# Patient Record
Sex: FEMALE | Race: WHITE | NOT HISPANIC OR LATINO | Employment: FULL TIME | URBAN - METROPOLITAN AREA
[De-identification: names, ages, dates, MRNs, and addresses within clinical notes are randomized per-mention and may not be internally consistent; named-entity substitution may affect disease eponyms.]

---

## 2017-01-10 ENCOUNTER — LAB REQUISITION (OUTPATIENT)
Dept: LAB | Facility: HOSPITAL | Age: 62
End: 2017-01-10
Payer: COMMERCIAL

## 2017-01-10 ENCOUNTER — ALLSCRIPTS OFFICE VISIT (OUTPATIENT)
Dept: OTHER | Facility: OTHER | Age: 62
End: 2017-01-10

## 2017-01-10 DIAGNOSIS — R30.0 DYSURIA: ICD-10-CM

## 2017-01-10 LAB
GLUCOSE (HISTORICAL): NORMAL
HGB UR QL STRIP.AUTO: NORMAL
KETONES UR STRIP-MCNC: NORMAL MG/DL
LEUKOCYTE ESTERASE UR QL STRIP: 1
NITRITE UR QL STRIP: NORMAL
PH UR STRIP.AUTO: 5 [PH]
PROT UR STRIP-MCNC: NORMAL MG/DL
SP GR UR STRIP.AUTO: 1.02

## 2017-01-10 PROCEDURE — G0145 SCR C/V CYTO,THINLAYER,RESCR: HCPCS | Performed by: FAMILY MEDICINE

## 2017-01-10 PROCEDURE — 87086 URINE CULTURE/COLONY COUNT: CPT | Performed by: PHYSICIAN ASSISTANT

## 2017-01-11 LAB — BACTERIA UR CULT: NORMAL

## 2017-01-12 ENCOUNTER — LAB REQUISITION (OUTPATIENT)
Dept: LAB | Facility: HOSPITAL | Age: 62
End: 2017-01-12
Payer: COMMERCIAL

## 2017-01-12 DIAGNOSIS — Z01.419 ENCOUNTER FOR GYNECOLOGICAL EXAMINATION WITHOUT ABNORMAL FINDING: ICD-10-CM

## 2017-01-16 LAB
LAB AP GYN PRIMARY INTERPRETATION: NORMAL
Lab: NORMAL

## 2017-06-30 ENCOUNTER — ALLSCRIPTS OFFICE VISIT (OUTPATIENT)
Dept: OTHER | Facility: OTHER | Age: 62
End: 2017-06-30

## 2018-01-12 VITALS
HEIGHT: 66 IN | DIASTOLIC BLOOD PRESSURE: 80 MMHG | OXYGEN SATURATION: 98 % | BODY MASS INDEX: 27.97 KG/M2 | SYSTOLIC BLOOD PRESSURE: 130 MMHG | WEIGHT: 174 LBS | HEART RATE: 62 BPM

## 2018-01-12 VITALS
BODY MASS INDEX: 27.97 KG/M2 | SYSTOLIC BLOOD PRESSURE: 130 MMHG | OXYGEN SATURATION: 98 % | WEIGHT: 174 LBS | DIASTOLIC BLOOD PRESSURE: 90 MMHG | HEART RATE: 64 BPM | HEIGHT: 66 IN

## 2018-04-25 ENCOUNTER — OFFICE VISIT (OUTPATIENT)
Dept: FAMILY MEDICINE CLINIC | Facility: CLINIC | Age: 63
End: 2018-04-25
Payer: COMMERCIAL

## 2018-04-25 VITALS
RESPIRATION RATE: 20 BRPM | SYSTOLIC BLOOD PRESSURE: 128 MMHG | TEMPERATURE: 97 F | OXYGEN SATURATION: 98 % | HEART RATE: 73 BPM | HEIGHT: 66 IN | DIASTOLIC BLOOD PRESSURE: 80 MMHG | BODY MASS INDEX: 28.28 KG/M2 | WEIGHT: 176 LBS

## 2018-04-25 DIAGNOSIS — Z12.11 SCREENING FOR COLON CANCER: ICD-10-CM

## 2018-04-25 DIAGNOSIS — R10.11 RIGHT UPPER QUADRANT ABDOMINAL PAIN: Primary | ICD-10-CM

## 2018-04-25 PROCEDURE — 99214 OFFICE O/P EST MOD 30 MIN: CPT | Performed by: NURSE PRACTITIONER

## 2018-04-25 RX ORDER — DEXTROAMPHETAMINE SULFATE, DEXTROAMPHETAMINE SACCHARATE, AMPHETAMINE ASPARTATE MONOHYDRATE, AND AMPHETAMINE SULFATE 12.5; 12.5; 12.5; 12.5 MG/1; MG/1; MG/1; MG/1
1 CAPSULE, EXTENDED RELEASE ORAL DAILY
Refills: 0 | COMMUNITY
Start: 2018-04-08

## 2018-04-25 RX ORDER — ONDANSETRON 4 MG/1
4 TABLET, FILM COATED ORAL EVERY 8 HOURS PRN
Qty: 20 TABLET | Refills: 0 | Status: SHIPPED | OUTPATIENT
Start: 2018-04-25 | End: 2020-03-02 | Stop reason: ALTCHOICE

## 2018-04-25 RX ORDER — DESVENLAFAXINE 100 MG/1
100 TABLET, EXTENDED RELEASE ORAL DAILY
COMMUNITY
Start: 2018-02-08

## 2018-04-25 NOTE — LETTER
April 25, 2018     Patient: Sun Ernandez   YOB: 1955   Date of Visit: 4/25/2018       To Whom it May Concern:    Sun Ernandez is under my professional care  She was seen in my office on 4/25/2018  She may return to work on Friday 4/27/18  She is also excused for the date of 04/24/18    If you have any questions or concerns, please don't hesitate to call           Sincerely,              NEETA Nava

## 2018-04-25 NOTE — PROGRESS NOTES
Assessment/Plan:    Screening for colon cancer    Provided referral to obtain colonoscopy  Right upper quadrant abdominal pain  To obtain abdominal ultrasound, office staff made patient an appointment for tomorrow at 1:00 pm   Will call with results  Counseled on beginning bland diet, to avoid fatty foods  To begin ondansetron every 8 hours as needed for nausea  To ER for any worsening abdominal pain  Follow-up after obtaining ultrasound  Diagnoses and all orders for this visit:    Right upper quadrant abdominal pain  -     ondansetron (ZOFRAN) 4 mg tablet; Take 1 tablet (4 mg total) by mouth every 8 (eight) hours as needed for nausea or vomiting  -     US abdomen complete; Future    Screening for colon cancer  -     Ambulatory referral to Gastroenterology; Future    Other orders  -     MYDAYIS 50 MG CP24; Take 1 capsule by mouth daily  -     desvenlafaxine (PRISTIQ) 100 mg 24 hr tablet;           Subjective:      Patient ID: Adriana Cárdenas is a 66 y o  female  Christophe Romano presents reporting multiple episode of non-bloody, bilious vomiting that started suddenly yesterday morning  She vomited about 4-5 times  The last time she vomited was early this morning  She continues to have some nausea  Denies fever, sick contacts, recent antibiotic usage, recent travel, or new medications  She does have a history of developing right upper quadrant pain intermittently for the past 2 years  She can typically  this with over indulging in fatty foods  She will developed right upper quadrant pain that will radiate to her mid back, she will also have vomiting with this  Currently, she is having mild right upper quadrant tenderness  It does not radiate  Christophe Romano has been taking omeprazole for heartburn symptoms which will provide minimal relief  Denies hematochezia, weight loss, or change in stools  She has never had a colonoscopy        Vomiting    Associated symptoms include abdominal pain and chills  Pertinent negatives include no diarrhea  Abdominal Pain   Associated symptoms include constipation, nausea and vomiting  Pertinent negatives include no diarrhea  The following portions of the patient's history were reviewed and updated as appropriate: She  has a past medical history of Dysuria and Gout  She   Patient Active Problem List    Diagnosis Date Noted    Right upper quadrant abdominal pain 04/25/2018    Screening for colon cancer 04/25/2018    GERD (gastroesophageal reflux disease) 10/21/2015    Anxiety 10/22/2014    Hyperlipidemia, mixed 10/22/2014     She  has a past surgical history that includes Other surgical history; Inguinal hernia repair; and Oophorectomy  Her family history includes Alcohol abuse in her father; Atrial fibrillation in her mother; Depression in her mother; Heart attack in her father, maternal grandfather, and paternal grandfather; Other in her maternal grandmother; Stroke in her paternal grandmother  She  reports that she has never smoked  She has never used smokeless tobacco  She reports that she does not drink alcohol or use drugs  Current Outpatient Prescriptions   Medication Sig Dispense Refill    ALPRAZolam (XANAX) 0 25 mg tablet 0 25 mg every 12 (twelve) hours as needed  1    cloNIDine (CATAPRES) 0 1 mg tablet TAKE 1/2 TABLETS BY MOUTH TWICE DAILY AND 1 AT BEDTIME  0    desvenlafaxine (PRISTIQ) 100 mg 24 hr tablet       MYDAYIS 50 MG CP24 Take 1 capsule by mouth daily  0    omeprazole (PriLOSEC) 20 mg delayed release capsule Take 1 capsule by mouth 2 (two) times a day      ondansetron (ZOFRAN) 4 mg tablet Take 1 tablet (4 mg total) by mouth every 8 (eight) hours as needed for nausea or vomiting 20 tablet 0     No current facility-administered medications for this visit        Current Outpatient Prescriptions on File Prior to Visit   Medication Sig    ALPRAZolam (XANAX) 0 25 mg tablet 0 25 mg every 12 (twelve) hours as needed    cloNIDine (CATAPRES) 0 1 mg tablet TAKE 1/2 TABLETS BY MOUTH TWICE DAILY AND 1 AT BEDTIME    omeprazole (PriLOSEC) 20 mg delayed release capsule Take 1 capsule by mouth 2 (two) times a day    [DISCONTINUED] amphetamine-dextroamphetamine (ADDERALL XR) 30 MG 24 hr capsule TAKE ONE CAPSULE AT 2PM    [DISCONTINUED] Desvenlafaxine Succinate ER (PRISTIQ) 25 MG TB24 Take 1 capsule by mouth daily    [DISCONTINUED] LORazepam (ATIVAN) 0 5 mg tablet Take 1 tablet by mouth 2 (two) times a day    [DISCONTINUED] omeprazole (PriLOSEC) 20 mg delayed release capsule     [DISCONTINUED] VYVANSE 70 MG capsule Take 70 mg by mouth daily     No current facility-administered medications on file prior to visit  She has No Known Allergies       Review of Systems   Constitutional: Positive for chills  HENT: Negative  Respiratory: Negative  Cardiovascular: Negative  Gastrointestinal: Positive for abdominal pain, constipation, nausea and vomiting  Negative for anal bleeding, blood in stool and diarrhea  Genitourinary: Negative  Musculoskeletal: Negative  Neurological: Negative  Psychiatric/Behavioral: Negative  /80   Pulse 73   Temp (!) 97 °F (36 1 °C)   Resp 20   Ht 5' 6" (1 676 m)   Wt 79 8 kg (176 lb)   SpO2 98%   BMI 28 41 kg/m²     Objective:     Physical Exam   Constitutional: She is oriented to person, place, and time  Vital signs are normal  She appears well-developed  Neck: Neck supple  Cardiovascular: Normal rate, regular rhythm and normal heart sounds  No murmur heard  Pulmonary/Chest: Effort normal and breath sounds normal    Abdominal: Soft  Bowel sounds are normal  There is no hepatosplenomegaly  There is tenderness in the right upper quadrant  Lymphadenopathy:     She has no cervical adenopathy  Neurological: She is alert and oriented to person, place, and time  Skin: Skin is warm and dry  Psychiatric: She has a normal mood and affect   Her behavior is normal  Judgment and thought content normal

## 2018-04-25 NOTE — ASSESSMENT & PLAN NOTE
To obtain abdominal ultrasound, office staff made patient an appointment for tomorrow at 1:00 pm   Will call with results  Counseled on beginning bland diet, to avoid fatty foods  To begin ondansetron every 8 hours as needed for nausea  To ER for any worsening abdominal pain  Follow-up after obtaining ultrasound

## 2018-04-26 ENCOUNTER — HOSPITAL ENCOUNTER (OUTPATIENT)
Dept: ULTRASOUND IMAGING | Facility: CLINIC | Age: 63
Discharge: HOME | End: 2018-04-26
Payer: COMMERCIAL

## 2018-04-26 DIAGNOSIS — R10.11 RIGHT UPPER QUADRANT ABDOMINAL PAIN: ICD-10-CM

## 2018-04-26 PROCEDURE — 76700 US EXAM ABDOM COMPLETE: CPT

## 2018-04-30 DIAGNOSIS — R10.11 RIGHT UPPER QUADRANT ABDOMINAL PAIN: Primary | ICD-10-CM

## 2018-04-30 NOTE — PROGRESS NOTES
Please call patient to make aware that abdominal ultrasound is negative, would like patient to obtain HIDA scan    Thank you

## 2018-06-28 ENCOUNTER — OFFICE VISIT (OUTPATIENT)
Dept: GASTROENTEROLOGY | Facility: CLINIC | Age: 63
End: 2018-06-28
Payer: COMMERCIAL

## 2018-06-28 VITALS
HEIGHT: 66 IN | BODY MASS INDEX: 29.29 KG/M2 | DIASTOLIC BLOOD PRESSURE: 74 MMHG | WEIGHT: 182.25 LBS | SYSTOLIC BLOOD PRESSURE: 122 MMHG | HEART RATE: 76 BPM

## 2018-06-28 DIAGNOSIS — K21.9 CHRONIC GERD: ICD-10-CM

## 2018-06-28 DIAGNOSIS — K21.9 GASTROESOPHAGEAL REFLUX DISEASE, ESOPHAGITIS PRESENCE NOT SPECIFIED: ICD-10-CM

## 2018-06-28 DIAGNOSIS — Z12.11 SCREENING FOR COLON CANCER: Primary | ICD-10-CM

## 2018-06-28 DIAGNOSIS — R11.14 BILIOUS VOMITING WITH NAUSEA: ICD-10-CM

## 2018-06-28 PROCEDURE — 99203 OFFICE O/P NEW LOW 30 MIN: CPT | Performed by: NURSE PRACTITIONER

## 2018-06-28 RX ORDER — FAMOTIDINE 40 MG/1
40 TABLET, FILM COATED ORAL
Qty: 30 TABLET | Refills: 6 | Status: SHIPPED | OUTPATIENT
Start: 2018-06-28 | End: 2019-08-30

## 2018-06-28 NOTE — PROGRESS NOTES
Assessment/Plan:    GERD diet discussed and handout given patient is agreeable to trying  EGD and initial colonoscopy  Pepcid as prescribed       Diagnoses and all orders for this visit:    Screening for colon cancer  -     Ambulatory referral to Gastroenterology; Future  -     Case request operating room: EGD AND COLONOSCOPY; Standing  -     Case request operating room: EGD AND COLONOSCOPY    Chronic GERD  -     Case request operating room: EGD AND COLONOSCOPY; Standing  -     famotidine (PEPCID) 40 MG tablet; Take 1 tablet (40 mg total) by mouth daily at bedtime for 30 days  -     Case request operating room: EGD AND COLONOSCOPY    Bilious vomiting with nausea  -     Case request operating room: EGD AND COLONOSCOPY; Standing  -     famotidine (PEPCID) 40 MG tablet; Take 1 tablet (40 mg total) by mouth daily at bedtime for 30 days  -     Case request operating room: EGD AND COLONOSCOPY    Gastroesophageal reflux disease, esophagitis presence not specified  -     famotidine (PEPCID) 40 MG tablet; Take 1 tablet (40 mg total) by mouth daily at bedtime for 30 days    @ASSESSMENTEN  D@     Subjective:      Patient ID: Yovany Alas is a 66 y o  female  53-year-old female is here for screening colonoscopy and reports no diarrhea, constipation, melena or hematochezia  She has no family history of colon cancer  Patient with GERD and heartburn 2-3 times a week for which she does not take medications for and periodically after she eats a very heavy fatty meal she has nausea, vomiting that is bilious without hematocrit emesis  She denies dysphagia, chronic sore throat, hoarse voice  She hadn't ultrasound of her abdomen by her PCP that was normal    Both of her daughters have GERD is well  She is scheduled for hidascan for right upper quadrant discomfort that occurs when she has ceased distinct episodes   Her last episode of nausea, vomiting, bloating occurred after she had a large Easter dinner with cheesecake for December  The following portions of the patient's history were reviewed and updated as appropriate: She  has a past medical history of Anxiety; Depression; Dysuria; Endometriosis; Gout; and History of fractured kneecap  She   Patient Active Problem List    Diagnosis Date Noted    Bilious vomiting with nausea 06/28/2018    Right upper quadrant abdominal pain 04/25/2018    Screening for colon cancer 04/25/2018    GERD (gastroesophageal reflux disease) 10/21/2015    Anxiety 10/22/2014    Hyperlipidemia, mixed 10/22/2014     She  has a past surgical history that includes Other surgical history; Inguinal hernia repair; Oophorectomy; and Other surgical history  Her family history includes Alcohol abuse in her father; Atrial fibrillation in her mother; Dementia in her mother; Depression in her mother; Heart attack in her father, maternal grandfather, and paternal grandfather; Other in her maternal grandmother; Stroke in her paternal grandmother  She  reports that she has never smoked  She has never used smokeless tobacco  She reports that she does not drink alcohol or use drugs  Current Outpatient Prescriptions   Medication Sig Dispense Refill    ALPRAZolam (XANAX) 0 25 mg tablet 0 25 mg every 12 (twelve) hours as needed  1    cloNIDine (CATAPRES) 0 1 mg tablet TAKE 1/2 TABLETS BY MOUTH TWICE DAILY AND 1 AT BEDTIME as needed  0    desvenlafaxine (PRISTIQ) 100 mg 24 hr tablet       MYDAYIS 50 MG CP24 Take 1 capsule by mouth daily  0    omeprazole (PriLOSEC) 20 mg delayed release capsule Take 1 capsule by mouth 2 (two) times a day      famotidine (PEPCID) 40 MG tablet Take 1 tablet (40 mg total) by mouth daily at bedtime for 30 days 30 tablet 6    ondansetron (ZOFRAN) 4 mg tablet Take 1 tablet (4 mg total) by mouth every 8 (eight) hours as needed for nausea or vomiting 20 tablet 0     No current facility-administered medications for this visit        Current Outpatient Prescriptions on File Prior to Visit   Medication Sig    ALPRAZolam (XANAX) 0 25 mg tablet 0 25 mg every 12 (twelve) hours as needed    cloNIDine (CATAPRES) 0 1 mg tablet TAKE 1/2 TABLETS BY MOUTH TWICE DAILY AND 1 AT BEDTIME as needed    desvenlafaxine (PRISTIQ) 100 mg 24 hr tablet     MYDAYIS 50 MG CP24 Take 1 capsule by mouth daily    omeprazole (PriLOSEC) 20 mg delayed release capsule Take 1 capsule by mouth 2 (two) times a day    ondansetron (ZOFRAN) 4 mg tablet Take 1 tablet (4 mg total) by mouth every 8 (eight) hours as needed for nausea or vomiting     No current facility-administered medications on file prior to visit  She has No Known Allergies       Review of Systems   Constitutional: Negative  HENT: Negative  Eyes: Negative  Respiratory: Negative  Cardiovascular: Negative  Gastrointestinal: Positive for nausea and vomiting  Endocrine: Negative  Musculoskeletal: Negative  Objective:      /74   Pulse 76   Ht 5' 6" (1 676 m)   Wt 82 7 kg (182 lb 4 oz)   BMI 29 42 kg/m²          Physical Exam   Constitutional: She is oriented to person, place, and time  She appears well-developed and well-nourished  Eyes: No scleral icterus  Cardiovascular: Normal rate and regular rhythm  Pulmonary/Chest: Effort normal and breath sounds normal    Abdominal: Soft  Bowel sounds are normal    Lymphadenopathy:     She has no cervical adenopathy  Neurological: She is alert and oriented to person, place, and time  Skin: Skin is warm and dry  Psychiatric: She has a normal mood and affect

## 2018-07-12 ENCOUNTER — ANESTHESIA EVENT (OUTPATIENT)
Dept: PERIOP | Facility: HOSPITAL | Age: 63
End: 2018-07-12
Payer: COMMERCIAL

## 2018-07-12 ENCOUNTER — HOSPITAL ENCOUNTER (OUTPATIENT)
Facility: HOSPITAL | Age: 63
Setting detail: OUTPATIENT SURGERY
Discharge: HOME | End: 2018-07-12
Attending: INTERNAL MEDICINE | Admitting: INTERNAL MEDICINE
Payer: COMMERCIAL

## 2018-07-12 ENCOUNTER — ANESTHESIA (OUTPATIENT)
Dept: PERIOP | Facility: HOSPITAL | Age: 63
End: 2018-07-12
Payer: COMMERCIAL

## 2018-07-12 VITALS
HEIGHT: 66 IN | TEMPERATURE: 98.4 F | RESPIRATION RATE: 20 BRPM | WEIGHT: 175.71 LBS | OXYGEN SATURATION: 99 % | DIASTOLIC BLOOD PRESSURE: 72 MMHG | BODY MASS INDEX: 28.24 KG/M2 | SYSTOLIC BLOOD PRESSURE: 136 MMHG | HEART RATE: 50 BPM

## 2018-07-12 DIAGNOSIS — K21.9 CHRONIC GERD: ICD-10-CM

## 2018-07-12 DIAGNOSIS — R11.14 BILIOUS VOMITING WITH NAUSEA: ICD-10-CM

## 2018-07-12 DIAGNOSIS — Z12.11 SCREENING FOR COLON CANCER: ICD-10-CM

## 2018-07-12 PROCEDURE — 43239 EGD BIOPSY SINGLE/MULTIPLE: CPT | Performed by: INTERNAL MEDICINE

## 2018-07-12 PROCEDURE — 88305 TISSUE EXAM BY PATHOLOGIST: CPT | Performed by: PATHOLOGY

## 2018-07-12 PROCEDURE — 45385 COLONOSCOPY W/LESION REMOVAL: CPT | Performed by: INTERNAL MEDICINE

## 2018-07-12 PROCEDURE — 88342 IMHCHEM/IMCYTCHM 1ST ANTB: CPT | Performed by: PATHOLOGY

## 2018-07-12 RX ORDER — PROPOFOL 10 MG/ML
INJECTION, EMULSION INTRAVENOUS AS NEEDED
Status: DISCONTINUED | OUTPATIENT
Start: 2018-07-12 | End: 2018-07-12 | Stop reason: SURG

## 2018-07-12 RX ORDER — SODIUM CHLORIDE, SODIUM LACTATE, POTASSIUM CHLORIDE, CALCIUM CHLORIDE 600; 310; 30; 20 MG/100ML; MG/100ML; MG/100ML; MG/100ML
INJECTION, SOLUTION INTRAVENOUS CONTINUOUS PRN
Status: DISCONTINUED | OUTPATIENT
Start: 2018-07-12 | End: 2018-07-12 | Stop reason: SURG

## 2018-07-12 RX ADMIN — PROPOFOL 50 MG: 10 INJECTION, EMULSION INTRAVENOUS at 08:07

## 2018-07-12 RX ADMIN — PROPOFOL 100 MG: 10 INJECTION, EMULSION INTRAVENOUS at 07:52

## 2018-07-12 RX ADMIN — PROPOFOL 50 MG: 10 INJECTION, EMULSION INTRAVENOUS at 08:11

## 2018-07-12 RX ADMIN — PROPOFOL 50 MG: 10 INJECTION, EMULSION INTRAVENOUS at 07:57

## 2018-07-12 RX ADMIN — SODIUM CHLORIDE, SODIUM LACTATE, POTASSIUM CHLORIDE, AND CALCIUM CHLORIDE: .6; .31; .03; .02 INJECTION, SOLUTION INTRAVENOUS at 07:36

## 2018-07-12 RX ADMIN — PROPOFOL 50 MG: 10 INJECTION, EMULSION INTRAVENOUS at 08:02

## 2018-07-12 RX ADMIN — PROPOFOL 30 MG: 10 INJECTION, EMULSION INTRAVENOUS at 08:15

## 2018-07-12 NOTE — OP NOTE
**** GI/ENDOSCOPY REPORT ****     PATIENT NAME: ERINN CRAWFORD ------ VISIT ID:  Patient ID:   [id number] YOB: 1955     INTRODUCTION: Colonoscopy - A 66 female patient presents for an outpatient   Colonoscopy at [address] [place] [address]  PREVIOUS COLONOSCOPY: No prior colonoscopy  INDICATIONS: Screening-ADR  CONSENT:  The benefits, risks, and alternatives to the procedure were   discussed and informed consent was obtained from the patient  PREPARATION: EKG, pulse, pulse oximetry and blood pressure were monitored   throughout the procedure  The patient was identified by myself both   verbally and by visual inspection of ID band  Airway Assessment   Classification: Airway class 2 - Visualization of the soft palate, fauces   and uvula  ASA Classification: Class 2 - Patient has mild to moderate   systemic disturbance that may or may not be related to the disorder   requiring surgery  MEDICATIONS: Anesthesia-check records     PROCEDURE:  The endoscope was passed without difficulty through the anus   under direct visualization and advanced to the cecum, confirmed by   appendiceal orifice and ileocecal valve  The scope was withdrawn and the   mucosa was carefully examined  The quality of the preparation was fair  Cecal Intubation Time: 13 minutes(s) Scope Withdrawal Time: 5 minutes(s)     RECTAL EXAM: Normal rectal exam      FINDINGS:  A single flat polyp, measuring 1 cm in size, was found in the   ascending colon  The polyp was completely removed piecemeal by snare   cautery polypectomy  Retrieved  A single polyp, measuring less than 5 mm   in size, was found in the ascending colon  The polyp was completely   removed by hot biopsy polypectomy  The polyp was retrieved  Normal   retroversion     COMPLICATIONS: There were no complications  IMPRESSIONS: A single flat polyp found in the ascending colon; removed by   snare cautery polypectomy   A single polyp found in the ascending colon;   removed by hot biopsy polypectomy  RECOMMENDATIONS: Follow-up on the results of the biopsy specimens  Colonoscopy recommended in 1 year  The recommended follow-up interval was   changed due to preparation for exam was inadequate/incomplete  ESTIMATED BLOOD LOSS: None  PATHOLOGY SPECIMENS: Completely removed by snare cautery polypectomy  Single polyp completely removed by hot biopsy polypectomy   PROCEDURE CODES: : Colonoscopy with snare polypectomy : Colonoscopy with   removal of tumor(s), polyp(s), or other lesion(s) by hot biopsy forceps or   bipolar cautery     ICD-9 Codes: 211 3 Benign neoplasm of colon 211 3 Benign neoplasm of colon     ICD-10 Codes: K63 5 Polyp of colon K63 5 Polyp of colon     PERFORMED BY: SANTIAGO Westbrook  [facility]  on 07/12/2018  Version 1, electronically signed by SANTIAGO Hoffman , D O  on   07/12/2018 at 08:25

## 2018-07-12 NOTE — ANESTHESIA PREPROCEDURE EVALUATION
Review of Systems/Medical History  Patient summary reviewed  Chart reviewed  No history of anesthetic complications     Cardiovascular  Exercise tolerance (METS): >4,  Hyperlipidemia,    Pulmonary       GI/Hepatic    GERD well controlled,             Endo/Other     GYN       Hematology   Musculoskeletal       Neurology   Psychology   Anxiety, Depression ,              Physical Exam    Airway    Mallampati score: II  TM Distance: >3 FB  Neck ROM: full     Dental   No notable dental hx     Cardiovascular      Pulmonary      Other Findings        Anesthesia Plan  ASA Score- 2     Anesthesia Type- IV sedation with anesthesia with ASA Monitors  Additional Monitors:   Airway Plan:         Plan Factors-    Induction- intravenous  Postoperative Plan-     Informed Consent- Anesthetic plan and risks discussed with patient  I personally reviewed this patient with the CRNA  Discussed and agreed on the Anesthesia Plan with the CRNA  Rosella Goldmann

## 2018-07-12 NOTE — DISCHARGE INSTRUCTIONS
Colonoscopy   WHAT YOU NEED TO KNOW:   A colonoscopy is a procedure to examine the inside of your colon (intestine) with a scope  Polyps or tissue growths may have been removed during your colonoscopy  It is normal to feel bloated and to have some abdominal discomfort  You should be passing gas  If you have hemorrhoids or you had polyps removed, you may have a small amount of bleeding  DISCHARGE INSTRUCTIONS:   Seek care immediately if:   · You have a large amount of bright red blood in your bowel movements  · Your abdomen is hard and firm and you have severe pain  · You have sudden trouble breathing  Contact your healthcare provider if:   · You develop a rash or hives  · You have a fever within 24 hours of your procedure  · You have not had a bowel movement for 3 days after your procedure  · You have questions or concerns about your condition or care  Activity:   · Do not lift, strain, or run  for 3 days after your procedure  · Rest after your procedure  You have been given medicine to relax you  Do not  drive or make important decisions until the day after your procedure  Return to your normal activity as directed  · Relieve gas and discomfort from bloating  by lying on your right side with a heating pad on your abdomen  You may need to take short walks to help the gas move out  Eat small meals until bloating is relieved  If you had polyps removed: For 7 days after your procedure:  · Do not  take aspirin  · Do not  go on long car rides  Help prevent constipation:   · Eat a variety of healthy foods  Healthy foods include fruit, vegetables, whole-grain breads, low-fat dairy products, beans, lean meat, and fish  Ask if you need to be on a special diet  Your healthcare provider may recommend that you eat high-fiber foods such as cooked beans  Fiber helps you have regular bowel movements  · Drink liquids as directed    Adults should drink between 9 and 13 eight-ounce cups of liquid every day  Ask what amount is best for you  For most people, good liquids to drink are water, juice, and milk  · Exercise as directed  Talk to your healthcare provider about the best exercise plan for you  Exercise can help prevent constipation, decrease your blood pressure and improve your health  Follow up with your healthcare provider as directed:  Write down your questions so you remember to ask them during your visits  © 2017 [employer] Information is for End User's use only and may not be sold, redistributed or otherwise used for commercial purposes  All illustrations and images included in CareNotes® are the copyrighted property of [employer]  or [employer]  The above information is an  only  It is not intended as medical advice for individual conditions or treatments  Talk to your doctor, nurse or pharmacist before following any medical regimen to see if it is safe and effective for you

## 2018-07-12 NOTE — ANESTHESIA POSTPROCEDURE EVALUATION
Post-Op Assessment Note      CV Status:  Stable    Mental Status:  Lethargic    Hydration Status:  Stable    PONV Controlled:  None    Airway Patency:  Patent    Post Op Vitals Reviewed: Yes          Staff: Anesthesiologist, CRNA           /65 (07/12/18 0821)    Temp      Pulse (!) 54 (07/12/18 0821)   Resp 18 (07/12/18 0821)    SpO2 98 % (07/12/18 0821)

## 2018-07-12 NOTE — OP NOTE
**** GI/ENDOSCOPY REPORT ****     PATIENT NAME: ERINN CRAWFORD - VISIT ID:  Patient ID: [id number]   YOB: 1955     INTRODUCTION: Esophagogastroduodenoscopy - A 66 female patient presents   for an outpatient Esophagogastroduodenoscopy at [address] [place] [address]  INDICATIONS: GERD  Vomiting  Nausea  CONSENT: The benefits, risks, and alternatives to the procedure were   discussed and informed consent was obtained from the patient  PREPARATION:  EKG, pulse, pulse oximetry and blood pressure were monitored   throughout the procedure  MEDICATIONS:asa 2     PROCEDURE:  The endoscope was passed without difficulty through the mouth   under direct visualization and advanced to the 3rd portion of the   duodenum  The scope was withdrawn and the mucosa was carefully examined  FINDINGS:   Esophagus: LA Class A, erosive, reflux-induced esophagitis was   found in the esophagus, 35 cm from the entry site  A biopsy was taken  The   Z line was visualized at 35 cm from the entry site  There was a 4 cm   hiatus hernia visible in the esophagus  Stomach: The antrum, body of the   stomach, cardia, fundus, incisura, and pylorus appeared to be normal  A   biopsy was taken from the antrum, body of the stomach, and fundus  Duodenum: The duodenal bulb, 2nd portion of the duodenum, and 3rd portion   of the duodenum appeared to be normal      COMPLICATIONS: There were no complications  IMPRESSIONS: Esophagitis seen  Biopsy taken  Z line visualized  A hiatus   hernia found  Normal antrum, body of the stomach, cardia, fundus,   incisura, and pylorus  Biopsy taken  Normal duodenal bulb, 2nd portion of   the duodenum, and 3rd portion of the duodenum  RECOMMENDATIONS: Follow-up on the results of the biopsy specimens in 1   week  Continue taking the following medications as prescribed: Omeprazole   (Prilosec) and famotidine (Pepcid)  ESTIMATED BLOOD LOSS: Insignificant       PATHOLOGY SPECIMENS: Biopsy taken  Associated finding: Esophagitis  Random   biopsy taken from the antrum, body of the stomach, and fundus  PROCEDURE CODES: 88522 - EGD flexible; with biopsy     ICD-9 Codes: 530 81 Esophageal reflux 787 03 Vomiting alone 787 02 Nausea   alone 553 3 Diaphragmatic hernia without mention of obstruction or gangrene     ICD-10 Codes: K21 Gastro-esophageal reflux disease R11 1 Vomiting R11 0   Nausea K20 9 Esophagitis, unspecified K44 Diaphragmatic hernia     PERFORMED BY: [facility]  on 07/12/2018  Version 1, electronically signed by SANTIAGO Blackburn , D O  on   07/12/2018 at 08:01

## 2018-07-19 ENCOUNTER — TELEPHONE (OUTPATIENT)
Dept: GASTROENTEROLOGY | Facility: CLINIC | Age: 63
End: 2018-07-19

## 2018-10-22 DIAGNOSIS — F41.9 ANXIETY: Primary | ICD-10-CM

## 2018-10-22 RX ORDER — ALPRAZOLAM 0.25 MG/1
0.25 TABLET ORAL EVERY 12 HOURS PRN
Qty: 30 TABLET | Refills: 0 | Status: SHIPPED | OUTPATIENT
Start: 2018-10-22 | End: 2019-08-30

## 2019-08-21 ENCOUNTER — TELEPHONE (OUTPATIENT)
Dept: GASTROENTEROLOGY | Facility: CLINIC | Age: 64
End: 2019-08-21

## 2019-08-30 ENCOUNTER — OFFICE VISIT (OUTPATIENT)
Dept: FAMILY MEDICINE CLINIC | Facility: CLINIC | Age: 64
End: 2019-08-30
Payer: COMMERCIAL

## 2019-08-30 VITALS
HEART RATE: 78 BPM | DIASTOLIC BLOOD PRESSURE: 76 MMHG | WEIGHT: 197 LBS | OXYGEN SATURATION: 97 % | BODY MASS INDEX: 31.66 KG/M2 | SYSTOLIC BLOOD PRESSURE: 128 MMHG | HEIGHT: 66 IN

## 2019-08-30 DIAGNOSIS — M54.16 LUMBAR RADICULITIS: ICD-10-CM

## 2019-08-30 DIAGNOSIS — F41.9 ANXIETY: ICD-10-CM

## 2019-08-30 DIAGNOSIS — M25.552 PAIN OF LEFT HIP JOINT: Primary | ICD-10-CM

## 2019-08-30 DIAGNOSIS — K21.9 GASTROESOPHAGEAL REFLUX DISEASE, ESOPHAGITIS PRESENCE NOT SPECIFIED: ICD-10-CM

## 2019-08-30 PROBLEM — R11.14 BILIOUS VOMITING WITH NAUSEA: Status: RESOLVED | Noted: 2018-06-28 | Resolved: 2019-08-30

## 2019-08-30 PROBLEM — Z12.11 SCREENING FOR COLON CANCER: Status: RESOLVED | Noted: 2018-04-25 | Resolved: 2019-08-30

## 2019-08-30 PROCEDURE — 99214 OFFICE O/P EST MOD 30 MIN: CPT | Performed by: FAMILY MEDICINE

## 2019-08-30 RX ORDER — ALPRAZOLAM 0.5 MG/1
0.5 TABLET ORAL 2 TIMES DAILY
Qty: 60 TABLET | Refills: 1 | Status: SHIPPED | OUTPATIENT
Start: 2019-08-30 | End: 2020-12-04 | Stop reason: SDUPTHER

## 2019-08-30 RX ORDER — OMEPRAZOLE 40 MG/1
40 CAPSULE, DELAYED RELEASE ORAL
Qty: 30 CAPSULE | Refills: 5 | Status: SHIPPED | OUTPATIENT
Start: 2019-08-30 | End: 2020-03-02 | Stop reason: SDUPTHER

## 2019-08-30 RX ORDER — PREDNISONE 10 MG/1
TABLET ORAL
Qty: 30 TABLET | Refills: 0 | Status: SHIPPED | OUTPATIENT
Start: 2019-08-30 | End: 2019-10-29

## 2019-08-30 RX ORDER — ALPRAZOLAM 0.5 MG/1
0.5 TABLET ORAL 2 TIMES DAILY
Refills: 1 | COMMUNITY
Start: 2019-07-24 | End: 2019-08-30 | Stop reason: SDUPTHER

## 2019-08-30 RX ORDER — CLONIDINE HYDROCHLORIDE 0.2 MG/1
0.2 TABLET ORAL 2 TIMES DAILY
Refills: 1 | COMMUNITY
Start: 2019-06-18 | End: 2019-10-29
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
## 2019-08-30 NOTE — PATIENT INSTRUCTIONS

## 2019-10-01 ENCOUNTER — TELEPHONE (OUTPATIENT)
Dept: FAMILY MEDICINE CLINIC | Facility: CLINIC | Age: 64
End: 2019-10-01

## 2019-10-01 NOTE — TELEPHONE ENCOUNTER
Pt's FMLA was approved for 2 weeks in order to care for her   She needs a letter from you stating she needs off to care for him until 10/14 returning to work  10/15    Call when ready [phone]

## 2019-10-01 NOTE — LETTER
Re:  Corine Riley    :   1955      The above requires 2 weeks off work in order to care for her ill     She may return to work on 10/15/2019      Ajith Argueta MD

## 2019-10-04 ENCOUNTER — HOSPITAL ENCOUNTER (OUTPATIENT)
Dept: RADIOLOGY | Facility: HOSPITAL | Age: 64
Discharge: HOME | End: 2019-10-04
Attending: FAMILY MEDICINE
Payer: COMMERCIAL

## 2019-10-04 DIAGNOSIS — M54.16 LUMBAR RADICULITIS: ICD-10-CM

## 2019-10-04 DIAGNOSIS — M25.552 PAIN OF LEFT HIP JOINT: ICD-10-CM

## 2019-10-04 PROCEDURE — 72100 X-RAY EXAM L-S SPINE 2/3 VWS: CPT

## 2019-10-04 PROCEDURE — 73502 X-RAY EXAM HIP UNI 2-3 VIEWS: CPT

## 2019-10-07 ENCOUNTER — EVALUATION (OUTPATIENT)
Dept: PHYSICAL THERAPY | Facility: CLINIC | Age: 64
End: 2019-10-07
Payer: COMMERCIAL

## 2019-10-07 DIAGNOSIS — M25.552 PAIN OF LEFT HIP JOINT: ICD-10-CM

## 2019-10-07 DIAGNOSIS — M54.16 LUMBAR RADICULOPATHY: Primary | ICD-10-CM

## 2019-10-07 PROCEDURE — 97161 PT EVAL LOW COMPLEX 20 MIN: CPT | Performed by: PHYSICAL THERAPIST

## 2019-10-07 PROCEDURE — 97110 THERAPEUTIC EXERCISES: CPT | Performed by: PHYSICAL THERAPIST

## 2019-10-10 ENCOUNTER — OFFICE VISIT (OUTPATIENT)
Dept: PHYSICAL THERAPY | Facility: CLINIC | Age: 64
End: 2019-10-10
Payer: COMMERCIAL

## 2019-10-10 DIAGNOSIS — M25.552 PAIN OF LEFT HIP JOINT: ICD-10-CM

## 2019-10-10 DIAGNOSIS — M54.16 LUMBAR RADICULOPATHY: Primary | ICD-10-CM

## 2019-10-10 PROCEDURE — 97110 THERAPEUTIC EXERCISES: CPT

## 2019-10-10 PROCEDURE — 97140 MANUAL THERAPY 1/> REGIONS: CPT

## 2019-10-10 NOTE — PROGRESS NOTES
Daily Note     Today's date: 10/10/2019  Patient name: Naheed Nicholas  : 1955  MRN: [id number]  Referring provider: Carleton Primrose, MD  Dx:   Encounter Diagnosis     ICD-10-CM    1  Lumbar radiculopathy M54 16    2  Pain of left hip joint M25 552        Start Time: 1500  Stop Time: 1555  Total time in clinic (min): 55 minutes    Subjective: Patient reports not having any pain in L leg today  Stated her back feels tight today  Objective: See treatment diary below      Assessment: Reviewed LB stretching program for patient HEP  No increased pain but stretching feeling in L LE post HEP stretches  Stretches corrected upslip  Patient tolerated 10 min on rec  Bike without difficulty  Cueing for posture  No increased pain with exercises  MFR to L glute area and sciatic notch to decrease tightness  Patient felt better post session  Plan: Continue per plan of care        Precautions: Anxiety/Depression, Chronic kidney disease, Gout, GERD      Manual  10/7 10/10           MFR L buttock/thigh  10'                                                                   Exercise Diary  10/7 10/10           Bike  10'           HEP review  20'           TB rows  20x           TB lat pull downs             TB Multifidus press             Supine TA activation HEP            Sup TA w/march HEP            Sup TA  W/alt UE/LE             Bridges             Sitting slump slider left LE HEP                         Calf stretches  10x           Pulleys  30x           Circuit Full  biceps/triceps, leg ext today 20x                                                                                             Modalities

## 2019-10-14 ENCOUNTER — OFFICE VISIT (OUTPATIENT)
Dept: PHYSICAL THERAPY | Facility: CLINIC | Age: 64
End: 2019-10-14
Payer: COMMERCIAL

## 2019-10-14 DIAGNOSIS — M25.552 PAIN OF LEFT HIP JOINT: ICD-10-CM

## 2019-10-14 DIAGNOSIS — M54.16 LUMBAR RADICULOPATHY: Primary | ICD-10-CM

## 2019-10-14 PROCEDURE — 97112 NEUROMUSCULAR REEDUCATION: CPT

## 2019-10-14 PROCEDURE — 97110 THERAPEUTIC EXERCISES: CPT

## 2019-10-14 NOTE — PROGRESS NOTES
Daily Note     Today's date: 10/14/2019  Patient name: Bandar Davis  : 1955  MRN: [id number]  Referring provider: Taniya Cancino MD  Dx:   Encounter Diagnosis     ICD-10-CM    1  Lumbar radiculopathy M54 16    2  Pain of left hip joint M25 552        Start Time: 1600  Stop Time: 1645  Total time in clinic (min): 45 minutes    Subjective: Patient stated that she was raking leaves the other day and was sore post activity  Stated her back is feeling a bit better today  Objective: See treatment diary below      Assessment: Patient tolerated exercises well without increased pain  Cueing for engaging core during circuit machines  Added ball posture for core stability and balance  Advised patient to avoid a lot of bending and twisting motions to prevent back pain and spasms  Plan: Continue per plan of care        Precautions: Anxiety/Depression, Chronic kidney disease, Gout, GERD      Manual  10/7 10/10           MFR L buttock/thigh  10'                                                                   Exercise Diary  10/7 10/10 10/14          Bike  10' 10'          HEP review  20'           TB rows  20x 20x          TB lat pull downs             TB Multifidus press             Supine TA activation HEP            Sup TA w/march HEP            Sup TA  W/alt UE/LE             Bridges             Sitting slump slider left LE HEP                         Calf stretches  10x 10x          Pulleys  30x 30x          Circuit Full  biceps/triceps, leg ext today 20x 20x          Ball posture   20x          Step ups    20x                                                                  Modalities

## 2019-10-17 ENCOUNTER — OFFICE VISIT (OUTPATIENT)
Dept: PHYSICAL THERAPY | Facility: CLINIC | Age: 64
End: 2019-10-17
Payer: COMMERCIAL

## 2019-10-17 DIAGNOSIS — M25.552 PAIN OF LEFT HIP JOINT: ICD-10-CM

## 2019-10-17 DIAGNOSIS — M54.16 LUMBAR RADICULOPATHY: Primary | ICD-10-CM

## 2019-10-17 PROCEDURE — 97140 MANUAL THERAPY 1/> REGIONS: CPT | Performed by: PHYSICAL THERAPIST

## 2019-10-17 PROCEDURE — 97110 THERAPEUTIC EXERCISES: CPT | Performed by: PHYSICAL THERAPIST

## 2019-10-17 NOTE — PROGRESS NOTES
Daily Note     Today's date: 10/17/2019  Patient name: Aj Sweet  : 1955  MRN: [id number]  Referring provider: Michaela Aviles MD  Dx:   Encounter Diagnosis     ICD-10-CM    1  Lumbar radiculopathy M54 16    2  Pain of left hip joint M25 552        Start Time: 1500  Stop Time: 1600  Total time in clinic (min): 60 minutes    Subjective: patient reports pain level 4/10 today      Objective: See treatment diary below      Assessment: progressed through exercises with good endurance and no increase in pain, decreased L buttock, thigh tightness noted post MFR      Plan: Cont POC       Precautions: Anxiety/Depression, Chronic kidney disease, Gout, GERD      Manual  10/7 10/10 10/17          MFR L buttock/thigh  10' 15'                                                                  Exercise Diary  10/7 10/10 10/14 10/17         Bike  10' 10' 15'         HEP review  20'           TB rows  20x 20x          TB lat pull downs             TB Multifidus press             Supine TA activation HEP            Sup TA w/march HEP            Sup TA  W/alt UE/LE             Bridges             Sitting slump slider left LE HEP                         Calf stretches  10x 10x 10x         Pulleys  30x 30x 30x         Circuit Full  biceps/triceps, leg ext today 20x 20x 20x         Ball posture   20x 20x         Step up/dwn/sdws    20x 20x ea                                                                 Modalities

## 2019-10-21 ENCOUNTER — OFFICE VISIT (OUTPATIENT)
Dept: PHYSICAL THERAPY | Facility: CLINIC | Age: 64
End: 2019-10-21
Payer: COMMERCIAL

## 2019-10-21 DIAGNOSIS — M54.16 LUMBAR RADICULOPATHY: Primary | ICD-10-CM

## 2019-10-21 DIAGNOSIS — M25.552 PAIN OF LEFT HIP JOINT: ICD-10-CM

## 2019-10-21 PROCEDURE — 97110 THERAPEUTIC EXERCISES: CPT

## 2019-10-21 PROCEDURE — 97116 GAIT TRAINING THERAPY: CPT

## 2019-10-21 NOTE — PROGRESS NOTES
Daily Note     Today's date: 10/21/2019  Patient name: Bandar Davis  : 1955  MRN: [id number]  Referring provider: Taniya Cancino MD  Dx:   Encounter Diagnosis     ICD-10-CM    1  Lumbar radiculopathy M54 16    2  Pain of left hip joint M25 552        Start Time: 1500  Stop Time: 1555  Total time in clinic (min): 55 minutes    Subjective: Patient stated she had some pain in the leg over the weekend but is feeling much better today  Objective: See treatment diary below      Assessment: Patient tolerated treatment well without increased pain  Gait training on TM for improved stride length and heel-toe gait  Patient experienced minor discomfort in leg with ambulation  Plan: Continue per plan of care        Precautions: Anxiety/Depression, Chronic kidney disease, Gout, GERD      Manual  10/7 10/10 10/17          MFR L buttock/thigh  10' 15'                                                                  Exercise Diary  10/7 10/10 10/14 10/17 10/21        Bike  10' 10' 15' 15'        HEP review  20'           TB rows  20x 20x          TB lat pull downs             TB Multifidus press             Supine TA activation HEP            Sup TA w/march HEP            Sup TA  W/alt UE/LE             Bridges             Sitting slump slider left LE HEP                         Calf stretches  10x 10x 10x 10x        Pulleys  30x 30x 30x 30x        Circuit Full  biceps/triceps, leg ext today 20x 20x 20x 20x        Ball posture   20x 20x 20x        Step up/dwn/sdws    20x 20x ea 20x        TM     8'                                                   Modalities

## 2019-10-24 ENCOUNTER — OFFICE VISIT (OUTPATIENT)
Dept: PHYSICAL THERAPY | Facility: CLINIC | Age: 64
End: 2019-10-24
Payer: COMMERCIAL

## 2019-10-24 DIAGNOSIS — M54.16 LUMBAR RADICULOPATHY: Primary | ICD-10-CM

## 2019-10-24 DIAGNOSIS — M25.552 PAIN OF LEFT HIP JOINT: ICD-10-CM

## 2019-10-24 PROCEDURE — 97116 GAIT TRAINING THERAPY: CPT

## 2019-10-24 PROCEDURE — 97140 MANUAL THERAPY 1/> REGIONS: CPT

## 2019-10-24 PROCEDURE — 97110 THERAPEUTIC EXERCISES: CPT

## 2019-10-24 NOTE — PROGRESS NOTES
Daily Note     Today's date: 10/24/2019  Patient name: Emeli Cordova  : 1955  MRN: [id number]  Referring provider: Aníbal Amato MD  Dx:   Encounter Diagnosis     ICD-10-CM    1  Lumbar radiculopathy M54 16    2  Pain of left hip joint M25 552        Start Time: 1505  Stop Time: 1600  Total time in clinic (min): 55 minutes    Subjective: Patient reports continued pain in L hip/buttock area but is improving, 4/10 pain  Objective: See treatment diary below      Assessment: Cueing on heel/toe gait and posture on TM  Patient tolerated exercises well  Attenuator and vibrator down L posterior leg to decrease sciatic pain and sx  Patient felt better post manual work  Plan: Continue per plan of care        Precautions: Anxiety/Depression, Chronic kidney disease, Gout, GERD      Manual  10/7 10/10 10/17 10/24         MFR L buttock/thigh  10' 15' 10' attenuator/vibrator                                                                 Exercise Diary  10/7 10/10 10/14 10/17 10/21 10/24       Bike  10' 10' 15' 15' 10'       HEP review  20'           TB rows  20x 20x          TB lat pull downs             TB Multifidus press             Supine TA activation HEP            Sup TA w/march HEP            Sup TA  W/alt UE/LE             Bridges             Sitting slump slider left LE HEP                         Calf stretches  10x 10x 10x 10x 10x       Pulleys  30x 30x 30x 30x 30x       Circuit Full  biceps/triceps, leg ext today 20x 20x 20x 20x 20x       Ball posture   20x 20x 20x        Step up/dwn/sdws    20x 20x ea 20x        TM     8' 10'                                                  Modalities

## 2019-10-29 ENCOUNTER — OFFICE VISIT (OUTPATIENT)
Dept: FAMILY MEDICINE CLINIC | Facility: CLINIC | Age: 64
End: 2019-10-29
Payer: COMMERCIAL

## 2019-10-29 VITALS
HEIGHT: 66 IN | RESPIRATION RATE: 16 BRPM | WEIGHT: 205 LBS | BODY MASS INDEX: 32.95 KG/M2 | SYSTOLIC BLOOD PRESSURE: 130 MMHG | DIASTOLIC BLOOD PRESSURE: 80 MMHG | TEMPERATURE: 99 F | OXYGEN SATURATION: 98 % | HEART RATE: 78 BPM

## 2019-10-29 DIAGNOSIS — E78.2 HYPERLIPIDEMIA, MIXED: ICD-10-CM

## 2019-10-29 DIAGNOSIS — M47.816 LUMBAR SPONDYLOSIS: ICD-10-CM

## 2019-10-29 DIAGNOSIS — K21.9 GASTROESOPHAGEAL REFLUX DISEASE, ESOPHAGITIS PRESENCE NOT SPECIFIED: ICD-10-CM

## 2019-10-29 DIAGNOSIS — Z00.00 HEALTH MAINTENANCE EXAMINATION: ICD-10-CM

## 2019-10-29 DIAGNOSIS — Z12.39 SCREENING FOR BREAST CANCER: Primary | ICD-10-CM

## 2019-10-29 DIAGNOSIS — F41.9 ANXIETY: ICD-10-CM

## 2019-10-29 DIAGNOSIS — Z11.59 NEED FOR HEPATITIS C SCREENING TEST: ICD-10-CM

## 2019-10-29 DIAGNOSIS — R53.83 OTHER FATIGUE: ICD-10-CM

## 2019-10-29 DIAGNOSIS — M54.16 LUMBAR RADICULITIS: ICD-10-CM

## 2019-10-29 PROBLEM — M19.90 ARTHRITIS: Status: ACTIVE | Noted: 2019-10-29

## 2019-10-29 PROBLEM — R10.11 RIGHT UPPER QUADRANT ABDOMINAL PAIN: Status: RESOLVED | Noted: 2018-04-25 | Resolved: 2019-10-29

## 2019-10-29 PROCEDURE — 99396 PREV VISIT EST AGE 40-64: CPT | Performed by: FAMILY MEDICINE

## 2019-10-29 RX ORDER — GABAPENTIN 300 MG/1
300 CAPSULE ORAL
Qty: 30 CAPSULE | Refills: 5 | Status: SHIPPED | OUTPATIENT
Start: 2019-10-29 | End: 2021-01-06 | Stop reason: ALTCHOICE

## 2019-10-29 RX ORDER — NAPROXEN 500 MG/1
500 TABLET ORAL 2 TIMES DAILY WITH MEALS
Qty: 60 TABLET | Refills: 5 | Status: SHIPPED | OUTPATIENT
Start: 2019-10-29 | End: 2021-01-06 | Stop reason: ALTCHOICE
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
## 2019-10-31 ENCOUNTER — APPOINTMENT (OUTPATIENT)
Dept: PHYSICAL THERAPY | Facility: CLINIC | Age: 64
End: 2019-10-31
Payer: COMMERCIAL

## 2019-11-04 ENCOUNTER — OFFICE VISIT (OUTPATIENT)
Dept: PHYSICAL THERAPY | Facility: CLINIC | Age: 64
End: 2019-11-04
Payer: COMMERCIAL

## 2019-11-04 DIAGNOSIS — M54.16 LUMBAR RADICULOPATHY: Primary | ICD-10-CM

## 2019-11-04 DIAGNOSIS — M25.552 PAIN OF LEFT HIP JOINT: ICD-10-CM

## 2019-11-04 PROCEDURE — 97112 NEUROMUSCULAR REEDUCATION: CPT

## 2019-11-04 PROCEDURE — 97110 THERAPEUTIC EXERCISES: CPT
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
## 2019-11-05 ENCOUNTER — TELEPHONE (OUTPATIENT)
Dept: FAMILY MEDICINE CLINIC | Facility: CLINIC | Age: 64
End: 2019-11-05

## 2019-11-05 NOTE — TELEPHONE ENCOUNTER
Pt dropped off FMLA papers for Dr Kannan Ayala to complete - they are ready , but pt phone was busy - please call again to let her know they are ready for

## 2019-11-06 ENCOUNTER — TRANSCRIBE ORDERS (OUTPATIENT)
Dept: ADMINISTRATIVE | Facility: HOSPITAL | Age: 64
End: 2019-11-06

## 2019-11-06 DIAGNOSIS — Z12.31 ENCOUNTER FOR SCREENING MAMMOGRAM FOR MALIGNANT NEOPLASM OF BREAST: Primary | ICD-10-CM

## 2019-11-07 ENCOUNTER — APPOINTMENT (OUTPATIENT)
Dept: LAB | Facility: HOSPITAL | Age: 64
End: 2019-11-07
Attending: FAMILY MEDICINE
Payer: COMMERCIAL

## 2019-11-07 DIAGNOSIS — Z11.59 NEED FOR HEPATITIS C SCREENING TEST: ICD-10-CM

## 2019-11-07 DIAGNOSIS — E78.2 HYPERLIPIDEMIA, MIXED: ICD-10-CM

## 2019-11-07 DIAGNOSIS — R53.83 OTHER FATIGUE: ICD-10-CM

## 2019-11-07 DIAGNOSIS — M54.16 LUMBAR RADICULITIS: ICD-10-CM

## 2019-11-07 LAB
ALBUMIN SERPL BCP-MCNC: 3.8 G/DL (ref 3.5–5)
ALP SERPL-CCNC: 92 U/L (ref 46–116)
ALT SERPL W P-5'-P-CCNC: 28 U/L (ref 12–78)
ANION GAP SERPL CALCULATED.3IONS-SCNC: 5 MMOL/L (ref 4–13)
AST SERPL W P-5'-P-CCNC: 21 U/L (ref 5–45)
BASOPHILS # BLD AUTO: 0.06 THOUSANDS/ΜL (ref 0–0.1)
BASOPHILS NFR BLD AUTO: 1 % (ref 0–1)
BILIRUB SERPL-MCNC: 0.4 MG/DL (ref 0.2–1)
BUN SERPL-MCNC: 14 MG/DL (ref 5–25)
CALCIUM SERPL-MCNC: 9.1 MG/DL (ref 8.3–10.1)
CHLORIDE SERPL-SCNC: 104 MMOL/L (ref 100–108)
CHOLEST SERPL-MCNC: 260 MG/DL (ref 50–200)
CO2 SERPL-SCNC: 30 MMOL/L (ref 21–32)
CREAT SERPL-MCNC: 0.67 MG/DL (ref 0.6–1.3)
EOSINOPHIL # BLD AUTO: 0.21 THOUSAND/ΜL (ref 0–0.61)
EOSINOPHIL NFR BLD AUTO: 4 % (ref 0–6)
ERYTHROCYTE [DISTWIDTH] IN BLOOD BY AUTOMATED COUNT: 13.7 % (ref 11.6–15.1)
ERYTHROCYTE [SEDIMENTATION RATE] IN BLOOD: 15 MM/HOUR (ref 0–20)
GFR SERPL CREATININE-BSD FRML MDRD: 95 ML/MIN/1.73SQ M
GLUCOSE P FAST SERPL-MCNC: 92 MG/DL (ref 65–99)
HCT VFR BLD AUTO: 40.9 % (ref 34.8–46.1)
HCV AB SER QL: NORMAL
HDLC SERPL-MCNC: 70 MG/DL
HGB BLD-MCNC: 14 G/DL (ref 11.5–15.4)
LDLC SERPL CALC-MCNC: 175 MG/DL (ref 0–100)
LYMPHOCYTES # BLD AUTO: 2.44 THOUSANDS/ΜL (ref 0.6–4.47)
LYMPHOCYTES NFR BLD AUTO: 48 % (ref 14–44)
MCH RBC QN AUTO: 32 PG (ref 26.8–34.3)
MCHC RBC AUTO-ENTMCNC: 34.2 G/DL (ref 31.4–37.4)
MCV RBC AUTO: 93 FL (ref 82–98)
MONOCYTES # BLD AUTO: 0.53 THOUSAND/ΜL (ref 0.17–1.22)
MONOCYTES NFR BLD AUTO: 10 % (ref 4–12)
NEUTROPHILS # BLD AUTO: 1.93 THOUSANDS/ΜL (ref 1.85–7.62)
NEUTS SEG NFR BLD AUTO: 37 % (ref 43–75)
NONHDLC SERPL-MCNC: 190 MG/DL
PLATELET # BLD AUTO: 337 THOUSANDS/UL (ref 149–390)
PMV BLD AUTO: 8.5 FL (ref 8.9–12.7)
POTASSIUM SERPL-SCNC: 3.9 MMOL/L (ref 3.5–5.3)
PROT SERPL-MCNC: 7.7 G/DL (ref 6.4–8.2)
RBC # BLD AUTO: 4.38 MILLION/UL (ref 3.81–5.12)
SODIUM SERPL-SCNC: 139 MMOL/L (ref 136–145)
T4 FREE SERPL-MCNC: 0.73 NG/DL (ref 0.76–1.46)
TRIGL SERPL-MCNC: 75 MG/DL
TSH SERPL DL<=0.05 MIU/L-ACNC: 5.88 UIU/ML (ref 0.36–3.74)
URATE SERPL-MCNC: 5.4 MG/DL (ref 2–6.8)
WBC # BLD AUTO: 5.17 THOUSAND/UL (ref 4.31–10.16)

## 2019-11-07 PROCEDURE — 84443 ASSAY THYROID STIM HORMONE: CPT

## 2019-11-07 PROCEDURE — 80061 LIPID PANEL: CPT

## 2019-11-07 PROCEDURE — 80053 COMPREHEN METABOLIC PANEL: CPT

## 2019-11-07 PROCEDURE — 85652 RBC SED RATE AUTOMATED: CPT

## 2019-11-07 PROCEDURE — 85025 COMPLETE CBC W/AUTO DIFF WBC: CPT

## 2019-11-07 PROCEDURE — 84550 ASSAY OF BLOOD/URIC ACID: CPT

## 2019-11-07 PROCEDURE — 86618 LYME DISEASE ANTIBODY: CPT

## 2019-11-07 PROCEDURE — 84439 ASSAY OF FREE THYROXINE: CPT

## 2019-11-07 PROCEDURE — 86803 HEPATITIS C AB TEST: CPT

## 2019-11-07 PROCEDURE — 86617 LYME DISEASE ANTIBODY: CPT

## 2019-11-07 PROCEDURE — 36415 COLL VENOUS BLD VENIPUNCTURE: CPT

## 2019-11-09 LAB
B BURGDOR IGG PATRN SER IB-IMP: NEGATIVE
B BURGDOR IGG+IGM SER-ACNC: 1.19 ISR (ref 0–0.9)
B BURGDOR IGM PATRN SER IB-IMP: NEGATIVE
B BURGDOR18KD IGG SER QL IB: PRESENT
B BURGDOR23KD IGG SER QL IB: ABNORMAL
B BURGDOR23KD IGM SER QL IB: ABNORMAL
B BURGDOR28KD IGG SER QL IB: ABNORMAL
B BURGDOR30KD IGG SER QL IB: ABNORMAL
B BURGDOR39KD IGG SER QL IB: PRESENT
B BURGDOR39KD IGM SER QL IB: ABNORMAL
B BURGDOR41KD IGG SER QL IB: ABNORMAL
B BURGDOR41KD IGM SER QL IB: ABNORMAL
B BURGDOR45KD IGG SER QL IB: ABNORMAL
B BURGDOR58KD IGG SER QL IB: ABNORMAL
B BURGDOR66KD IGG SER QL IB: ABNORMAL
B BURGDOR93KD IGG SER QL IB: ABNORMAL

## 2019-11-10 DIAGNOSIS — E03.9 HYPOTHYROIDISM, UNSPECIFIED TYPE: Primary | ICD-10-CM

## 2019-11-10 RX ORDER — LEVOTHYROXINE SODIUM 0.03 MG/1
25 TABLET ORAL DAILY
Qty: 90 TABLET | Refills: 5 | Status: SHIPPED | OUTPATIENT
Start: 2019-11-10 | End: 2020-09-30 | Stop reason: SDUPTHER

## 2019-11-11 ENCOUNTER — OFFICE VISIT (OUTPATIENT)
Dept: PHYSICAL THERAPY | Facility: CLINIC | Age: 64
End: 2019-11-11
Payer: COMMERCIAL

## 2019-11-11 DIAGNOSIS — M54.16 LUMBAR RADICULOPATHY: Primary | ICD-10-CM

## 2019-11-11 DIAGNOSIS — M25.552 PAIN OF LEFT HIP JOINT: ICD-10-CM

## 2019-11-11 PROCEDURE — 97110 THERAPEUTIC EXERCISES: CPT

## 2019-11-11 NOTE — PROGRESS NOTES
Daily Note     Today's date: 2019  Patient name: Malcolm Manley  : 1955  MRN: [id number]  Referring provider: Connor Malloy MD  Dx:   Encounter Diagnosis     ICD-10-CM    1  Lumbar radiculopathy M54 16    2  Pain of left hip joint M25 552                   Subjective: Pt fell on Saturday and has mild soreness to R knee  She will be getting an MRI later this week on lumbar spine and L hip and was also diagnosed with an underactive thyroid and will start medication  Objective: See treatment diary below      Assessment: Tolerated treatment program well today having no increases in pain  Pt reports 35% decrease in pain  She is still not able to lift to her usual potential at this point in time  She was educated about the fitness program and is interested  Verbal and tactile cueing needed for upright posture and correct exercise technique  Plan: Continue with current POC to address pt deficits        Precautions: Anxiety/Depression, Chronic kidney disease, Gout, GERD      Manual  10/7 10/10 10/17 10/24         MFR L buttock/thigh  10' 15' 10' attenuator/vibrator                                                                 Exercise Diary  10/7 10/10 10/14 10/17 10/21 10/24 11/4 11/11     Bike  10' 10' 15' 15' 10' 10' 10'     HEP review  20'           TB rows  20x 20x          TB lat pull downs             TB Multifidus press             Supine TA activation HEP            Sup TA w/march HEP            Sup TA  W/alt UE/LE             Bridges             Sitting slump slider left LE HEP                         Calf stretches  10x 10x 10x 10x 10x 10"x10 10"x10     Pulleys  30x 30x 30x 30x 30x 30x 30x     Circuit Full  biceps/triceps, leg ext today 20x 20x 20x 20x 20x 30x 30x     Ball posture   20x 20x 20x  20x 20x     Step up/dwn/sdws    20x 20x ea 20x        TM     8' 10'       BOSU lunges       10x ea 10x ea                                   Modalities

## 2019-11-13 ENCOUNTER — HOSPITAL ENCOUNTER (OUTPATIENT)
Dept: MAMMOGRAPHY | Facility: CLINIC | Age: 64
Discharge: HOME | End: 2019-11-13
Payer: COMMERCIAL

## 2019-11-13 VITALS — HEIGHT: 66 IN | BODY MASS INDEX: 30.53 KG/M2 | WEIGHT: 190 LBS

## 2019-11-13 DIAGNOSIS — Z12.31 ENCOUNTER FOR SCREENING MAMMOGRAM FOR MALIGNANT NEOPLASM OF BREAST: ICD-10-CM

## 2019-11-13 PROCEDURE — 77067 SCR MAMMO BI INCL CAD: CPT

## 2019-11-14 ENCOUNTER — APPOINTMENT (OUTPATIENT)
Dept: PHYSICAL THERAPY | Facility: CLINIC | Age: 64
End: 2019-11-14
Payer: COMMERCIAL

## 2019-11-25 ENCOUNTER — TELEPHONE (OUTPATIENT)
Dept: GASTROENTEROLOGY | Facility: CLINIC | Age: 64
End: 2019-11-25

## 2019-11-25 NOTE — TELEPHONE ENCOUNTER
11/25/19  Screened by: Radha Santos    Referring Provider     Pre- Screening: There is no height or weight on file to calculate BMI  Has patient been referred for a routine screening Colonoscopy? yes  Is the patient between 39-70 years old? yes    SCHEDULING STAFF   If the patient is between 45yrs-49yrs, please advise patient to confirm benefits/coverage with their insurance company for a routine screening colonoscopy, some insurance carriers will only cover at [phone] or older   If the patient is over 66years old, please schedule an office visit  Do you have any of the following symptoms? NO    Have you had a coronary or vascular stent within the last year? no    Have you had a heart attack or stroke in the last 6 months? no    Have you had intestinal surgery in the last 3 months? no    Do you have problems with: NO    Do you use: NO    Have you been hospitalized in the last Month? no    Have you been diagnosed with a bleeding disorder or anemia? no    Have you had chest pain (angina) or breathing problems  (COPD) in the last 3 months? no    Do you have any difficulty walking up a flight of stairs? no    Have you had Kidney failure or insufficiency? no    Have you had heart valve surgery? no    Are you confined to a wheelchair? no    Do you take NO    Have you been diagnosed with Diabetes or are you taking any   Diabetic medications? no    : If patient answers NO to medical questions, then schedule procedure  If patient answers YES to medical questions, then schedule office appointment  Previous Colonoscopy yes  Date and Facility of last colonoscopy?  July 2018      Comments:

## 2019-11-26 ENCOUNTER — TELEPHONE (OUTPATIENT)
Dept: FAMILY MEDICINE CLINIC | Facility: CLINIC | Age: 64
End: 2019-11-26

## 2019-11-26 NOTE — TELEPHONE ENCOUNTER
----- Message from Alesha Balderas PA-C sent at 11/26/2019  1:01 PM EST -----  Please let patient know mammogram is normal

## 2019-12-04 ENCOUNTER — ANESTHESIA EVENT (OUTPATIENT)
Dept: GASTROENTEROLOGY | Facility: HOSPITAL | Age: 64
End: 2019-12-04

## 2019-12-05 ENCOUNTER — HOSPITAL ENCOUNTER (OUTPATIENT)
Dept: GASTROENTEROLOGY | Facility: HOSPITAL | Age: 64
Setting detail: OUTPATIENT SURGERY
Discharge: HOME | End: 2019-12-05
Attending: INTERNAL MEDICINE | Admitting: INTERNAL MEDICINE
Payer: COMMERCIAL

## 2019-12-05 ENCOUNTER — ANESTHESIA (OUTPATIENT)
Dept: GASTROENTEROLOGY | Facility: HOSPITAL | Age: 64
End: 2019-12-05

## 2019-12-05 VITALS
DIASTOLIC BLOOD PRESSURE: 63 MMHG | OXYGEN SATURATION: 94 % | HEART RATE: 60 BPM | HEIGHT: 66 IN | WEIGHT: 200.4 LBS | SYSTOLIC BLOOD PRESSURE: 111 MMHG | TEMPERATURE: 97.3 F | BODY MASS INDEX: 32.21 KG/M2 | RESPIRATION RATE: 18 BRPM

## 2019-12-05 DIAGNOSIS — Z12.11 SCREENING FOR COLON CANCER: ICD-10-CM

## 2019-12-05 PROCEDURE — 88305 TISSUE EXAM BY PATHOLOGIST: CPT | Performed by: PATHOLOGY

## 2019-12-05 PROCEDURE — 45385 COLONOSCOPY W/LESION REMOVAL: CPT | Performed by: INTERNAL MEDICINE

## 2019-12-05 RX ORDER — SODIUM CHLORIDE, SODIUM LACTATE, POTASSIUM CHLORIDE, CALCIUM CHLORIDE 600; 310; 30; 20 MG/100ML; MG/100ML; MG/100ML; MG/100ML
125 INJECTION, SOLUTION INTRAVENOUS CONTINUOUS
Status: DISCONTINUED | OUTPATIENT
Start: 2019-12-05 | End: 2019-12-09 | Stop reason: HOSPADM

## 2019-12-05 RX ORDER — SODIUM CHLORIDE, SODIUM LACTATE, POTASSIUM CHLORIDE, CALCIUM CHLORIDE 600; 310; 30; 20 MG/100ML; MG/100ML; MG/100ML; MG/100ML
INJECTION, SOLUTION INTRAVENOUS CONTINUOUS PRN
Status: DISCONTINUED | OUTPATIENT
Start: 2019-12-05 | End: 2019-12-05 | Stop reason: SURG

## 2019-12-05 RX ORDER — PROPOFOL 10 MG/ML
INJECTION, EMULSION INTRAVENOUS AS NEEDED
Status: DISCONTINUED | OUTPATIENT
Start: 2019-12-05 | End: 2019-12-05 | Stop reason: SURG

## 2019-12-05 RX ADMIN — SODIUM CHLORIDE, SODIUM LACTATE, POTASSIUM CHLORIDE, AND CALCIUM CHLORIDE 125 ML/HR: .6; .31; .03; .02 INJECTION, SOLUTION INTRAVENOUS at 12:30

## 2019-12-05 RX ADMIN — SODIUM CHLORIDE, SODIUM LACTATE, POTASSIUM CHLORIDE, AND CALCIUM CHLORIDE: .6; .31; .03; .02 INJECTION, SOLUTION INTRAVENOUS at 12:31

## 2019-12-05 RX ADMIN — PROPOFOL 100 MG: 10 INJECTION, EMULSION INTRAVENOUS at 12:45

## 2019-12-05 RX ADMIN — PROPOFOL 50 MG: 10 INJECTION, EMULSION INTRAVENOUS at 12:44

## 2019-12-05 RX ADMIN — PROPOFOL 100 MG: 10 INJECTION, EMULSION INTRAVENOUS at 12:43

## 2019-12-05 RX ADMIN — PROPOFOL 50 MG: 10 INJECTION, EMULSION INTRAVENOUS at 12:48

## 2019-12-05 NOTE — DISCHARGE INSTRUCTIONS
Colonoscopy   WHAT YOU NEED TO KNOW:   A colonoscopy is a procedure to examine the inside of your colon (intestine) with a scope  Polyps or tissue growths may have been removed during your colonoscopy  It is normal to feel bloated and to have some abdominal discomfort  You should be passing gas  If you have hemorrhoids or you had polyps removed, you may have a small amount of bleeding  DISCHARGE INSTRUCTIONS:   Seek care immediately if:   · You have a large amount of bright red blood in your bowel movements  · Your abdomen is hard and firm and you have severe pain  · You have sudden trouble breathing  Contact your healthcare provider if:   · You develop a rash or hives  · You have a fever within 24 hours of your procedure       · You have not had a bowel movement for 3 days after your procedure  · You have questions or concerns about your condition or care  Activity:   · Do not lift, strain, or run  for 3 days after your procedure  · Rest after your procedure  You have been given medicine to relax you  Do not  drive or make important decisions until the day after your procedure  Return to your normal activity as directed  · Relieve gas and discomfort from bloating  by lying on your right side with a heating pad on your abdomen  You may need to take short walks to help the gas move out  Eat small meals until bloating is relieved  If you had polyps removed: For 7 days after your procedure:  · Do not  take aspirin  · Do not  go on long car rides  Follow up with your healthcare provider as directed:  Write down your questions so you remember to ask them during your visits  © 2017 [employer] is for End User's use only and may not be sold, redistributed or otherwise used for commercial purposes  All illustrations and images included in CareNotes® are the copyrighted property of [employer]  or [employer]    The above information is an  only  It is not intended as medical advice for individual conditions or treatments  Talk to your doctor, nurse or pharmacist before following any medical regimen to see if it is safe and effective for you

## 2019-12-05 NOTE — ANESTHESIA POSTPROCEDURE EVALUATION
Post-Op Assessment Note    CV Status:  Stable  Pain Score: 1    Pain management: adequate     Mental Status:  Sleepy   Hydration Status:  Stable   PONV Controlled:  Controlled   Airway Patency:  Patent   Post Op Vitals Reviewed: Yes      Staff: Anesthesiologist, CRNA           BP 95/50 (12/05/19 1306)    Temp (!) 97 3 °F (36 3 °C) (12/05/19 1306)    Pulse 64 (12/05/19 1306)   Resp 22 (12/05/19 1306)    SpO2 93 % (12/05/19 1306)

## 2019-12-05 NOTE — ANESTHESIA PREPROCEDURE EVALUATION
Review of Systems/Medical History  Patient summary reviewed        Cardiovascular  Hyperlipidemia,    Pulmonary  Negative pulmonary ROS        GI/Hepatic    GERD ,        Chronic kidney disease ,        Endo/Other  Negative endo/other ROS      GYN  Negative gynecology ROS          Hematology  Negative hematology ROS      Musculoskeletal  Gout,   Arthritis     Neurology  Negative neurology ROS      Psychology   Anxiety, Depression ,              Physical Exam    Airway    Mallampati score: II  TM Distance: >3 FB  Neck ROM: full     Dental       Cardiovascular  Cardiovascular exam normal    Pulmonary  Pulmonary exam normal     Other Findings        Anesthesia Plan  ASA Score- 2     Anesthesia Type- IV sedation with anesthesia with ASA Monitors  Additional Monitors:   Airway Plan:         Plan Factors-    Induction- intravenous  Postoperative Plan-     Informed Consent- Anesthetic plan and risks discussed with patient  I personally reviewed this patient with the CRNA  Discussed and agreed on the Anesthesia Plan with the CRNA  Xi Monte

## 2019-12-05 NOTE — H&P
History and Physical -  [facility]  Stuart Rivers 60 y o  female MRN: [id number]      HPI: Stuart Rivers is a 60y o  year old female who presents for personal history of colon polyps      REVIEW OF SYSTEMS: Per the HPI, and otherwise unremarkable  Historical Information   Past Medical History:   Diagnosis Date    Anxiety     Chronic kidney disease     renal sac    Depression     Disease of thyroid gland     Dysuria     LAST ASSESSED [id number]    Endometriosis     Gout     History of fractured kneecap     hairline fracture right knee due to accident      Past Surgical History:   Procedure Laterality Date    COLONOSCOPY      INGUINAL HERNIA REPAIR      LAST ASSESSED [id number]    OOPHORECTOMY  2004    thinks right side    OTHER SURGICAL HISTORY      FACE SURGERY  -- LAST ASSESSED [id number]    OTHER SURGICAL HISTORY      stiches in chin due to accident     DE COLONOSCOPY FLX DX W/COLLJ SPEC WHEN PFRMD N/A 7/12/2018    Procedure: EGD AND COLONOSCOPY;  Surgeon: Prachi Ortiz MD;  Location: [facility];   Service: Gastroenterology     Social History   Social History     Substance and Sexual Activity   Alcohol Use No     Social History     Substance and Sexual Activity   Drug Use No     Social History     Tobacco Use   Smoking Status Never Smoker   Smokeless Tobacco Never Used     Family History   Problem Relation Age of Onset    Atrial fibrillation Mother     Depression Mother     Dementia Mother     Alcohol abuse Father     Heart attack Father     Other Maternal Grandmother         BLOOD DISORDER    Heart attack Maternal Grandfather     Stroke Paternal Grandmother     Heart attack Paternal Grandfather     No Known Problems Sister     No Known Problems Daughter     No Known Problems Daughter     No Known Problems Maternal Aunt     No Known Problems Maternal Aunt     No Known Problems Paternal Aunt     Breast cancer Neg Hx        Meds/Allergies       (Not in a hospital admission)    No Known Allergies    Objective     not currently breastfeeding  PHYSICAL EXAM    Gen: NAD  CV: RRR  CHEST: Clear  ABD: soft, NT/ND  EXT: no edema      ASSESSMENT/PLAN:  This is a 60y o  year old female here for colonoscopy, and she is stable and optimized for her procedure

## 2019-12-06 ENCOUNTER — HOSPITAL ENCOUNTER (OUTPATIENT)
Dept: MRI IMAGING | Facility: HOSPITAL | Age: 64
Discharge: HOME | End: 2019-12-06
Attending: FAMILY MEDICINE
Payer: COMMERCIAL

## 2019-12-06 DIAGNOSIS — M54.16 LUMBAR RADICULITIS: ICD-10-CM

## 2019-12-06 DIAGNOSIS — M47.816 LUMBAR SPONDYLOSIS: ICD-10-CM

## 2019-12-06 PROCEDURE — 72148 MRI LUMBAR SPINE W/O DYE: CPT

## 2019-12-08 DIAGNOSIS — M54.16 LUMBAR RADICULOPATHY: Primary | ICD-10-CM

## 2019-12-31 ENCOUNTER — OFFICE VISIT (OUTPATIENT)
Dept: FAMILY MEDICINE CLINIC | Facility: CLINIC | Age: 64
End: 2019-12-31
Payer: COMMERCIAL

## 2019-12-31 VITALS
HEIGHT: 66 IN | TEMPERATURE: 98.5 F | HEART RATE: 68 BPM | OXYGEN SATURATION: 99 % | WEIGHT: 209 LBS | BODY MASS INDEX: 33.59 KG/M2 | SYSTOLIC BLOOD PRESSURE: 112 MMHG | RESPIRATION RATE: 16 BRPM | DIASTOLIC BLOOD PRESSURE: 70 MMHG

## 2019-12-31 DIAGNOSIS — M47.816 LUMBAR SPONDYLOSIS: Primary | ICD-10-CM

## 2019-12-31 DIAGNOSIS — L98.9 SKIN LESION: ICD-10-CM

## 2019-12-31 DIAGNOSIS — E03.9 HYPOTHYROIDISM, UNSPECIFIED TYPE: ICD-10-CM

## 2019-12-31 DIAGNOSIS — R53.83 OTHER FATIGUE: ICD-10-CM

## 2019-12-31 DIAGNOSIS — E78.2 HYPERLIPIDEMIA, MIXED: ICD-10-CM

## 2019-12-31 PROCEDURE — 3008F BODY MASS INDEX DOCD: CPT | Performed by: FAMILY MEDICINE

## 2019-12-31 PROCEDURE — 99214 OFFICE O/P EST MOD 30 MIN: CPT | Performed by: FAMILY MEDICINE

## 2019-12-31 PROCEDURE — 1036F TOBACCO NON-USER: CPT | Performed by: FAMILY MEDICINE

## 2019-12-31 RX ORDER — PREDNISONE 10 MG/1
TABLET ORAL
Qty: 30 TABLET | Refills: 0 | Status: SHIPPED | OUTPATIENT
Start: 2019-12-31 | End: 2020-03-02 | Stop reason: ALTCHOICE

## 2019-12-31 NOTE — PROGRESS NOTES
Assessment/Plan:  Return visit in 6 weeks     Diagnoses and all orders for this visit:    Lumbar spondylosis  -     Ambulatory referral to Chiropractic; Future  -     predniSONE 10 mg tablet; 4 daily x3, 3 daily x3, 2 daily x3, 1 daily x3  -     Ambulatory referral to Physical Therapy; Future    Other fatigue  -     Ambulatory referral to Sleep Medicine; Future    Skin lesion  -     Ambulatory referral to Dermatology; Future    Hyperlipidemia, mixed    Hypothyroidism, unspecified type        Hyperlipidemia, mixed  We will treat this once her thyroid is corrected  Hypothyroidism  Continue Synthroid 25 mcg daily  Check thyroid function test now         Subjective:      Patient ID: Brittany Rocha is a 60 y o  female  Patient comes in for recheck  She is very upset regarding persistent weight gain and fatigue despite starting thyroid medication  She continues to have low back pain with radiation into her left hip  She is also concerned regarding a skin lesion on her right cheek  The following portions of the patient's history were reviewed and updated as appropriate:   She has a past medical history of Anxiety, Chronic kidney disease, Depression, Disease of thyroid gland, Dysuria, Endometriosis, Gout, and History of fractured kneecap ,  does not have any pertinent problems on file  ,   has a past surgical history that includes Other surgical history; Inguinal hernia repair; Other surgical history; pr colonoscopy flx dx w/collj spec when pfrmd (N/A, 7/12/2018); Oophorectomy (2004); and Colonoscopy  ,  family history includes Alcohol abuse in her father; Atrial fibrillation in her mother; Dementia in her mother; Depression in her mother; Heart attack in her father, maternal grandfather, and paternal grandfather; No Known Problems in her daughter, daughter, maternal aunt, maternal aunt, paternal aunt, and sister; Other in her maternal grandmother; Stroke in her paternal grandmother  ,   reports that she has never smoked  She has never used smokeless tobacco  She reports that she does not drink alcohol or use drugs  ,  has No Known Allergies     Current Outpatient Medications   Medication Sig Dispense Refill    ALPRAZolam (XANAX) 0 5 mg tablet Take 1 tablet (0 5 mg total) by mouth 2 (two) times a day 60 tablet 1    desvenlafaxine (PRISTIQ) 100 mg 24 hr tablet       gabapentin (NEURONTIN) 300 mg capsule Take 1 capsule (300 mg total) by mouth daily at bedtime 30 capsule 5    levothyroxine 25 mcg tablet Take 1 tablet (25 mcg total) by mouth daily 90 tablet 5    MYDAYIS 50 MG CP24 Take 1 capsule by mouth daily  0    naproxen (NAPROSYN) 500 mg tablet Take 1 tablet (500 mg total) by mouth 2 (two) times a day with meals 60 tablet 5    omeprazole (PriLOSEC) 40 MG capsule Take 1 capsule (40 mg total) by mouth daily before breakfast 30 capsule 5    ondansetron (ZOFRAN) 4 mg tablet Take 1 tablet (4 mg total) by mouth every 8 (eight) hours as needed for nausea or vomiting 20 tablet 0    predniSONE 10 mg tablet 4 daily x3, 3 daily x3, 2 daily x3, 1 daily x3 30 tablet 0     No current facility-administered medications for this visit  Review of Systems   Constitutional: Positive for fatigue  Negative for fever  HENT: Negative  Respiratory: Negative  Cardiovascular: Negative  Musculoskeletal: Positive for back pain  Objective:  Vitals:    12/31/19 0948   BP: 112/70   Pulse: 68   Resp: 16   Temp: 98 5 °F (36 9 °C)   SpO2: 99%   Weight: 94 8 kg (209 lb)   Height: 5' 6" (1 676 m)     Body mass index is 33 73 kg/m²  Physical Exam   Constitutional: She is oriented to person, place, and time  She appears well-developed  HENT:   Head: Normocephalic and atraumatic  Right Ear: Tympanic membrane and external ear normal    Left Ear: Tympanic membrane and external ear normal    Mouth/Throat: Oropharynx is clear and moist    Eyes: Pupils are equal, round, and reactive to light   EOM are normal    Neck: Neck supple  No thyromegaly present  Cardiovascular: Normal rate, regular rhythm and normal heart sounds  Pulmonary/Chest: Effort normal and breath sounds normal    Abdominal: Soft  Musculoskeletal: Normal range of motion  Tender lumbar paraspinal muscles   Lymphadenopathy:     She has no cervical adenopathy  Neurological: She is alert and oriented to person, place, and time  Skin:   Small skin cyst right cheek   Psychiatric: She has a normal mood and affect

## 2020-01-10 ENCOUNTER — APPOINTMENT (OUTPATIENT)
Dept: LAB | Facility: HOSPITAL | Age: 65
End: 2020-01-10
Attending: FAMILY MEDICINE
Payer: COMMERCIAL

## 2020-01-10 DIAGNOSIS — E03.9 HYPOTHYROIDISM, UNSPECIFIED TYPE: ICD-10-CM

## 2020-01-10 LAB — T4 FREE SERPL-MCNC: 0.92 NG/DL (ref 0.76–1.46)

## 2020-01-10 PROCEDURE — 84439 ASSAY OF FREE THYROXINE: CPT

## 2020-01-10 PROCEDURE — 36415 COLL VENOUS BLD VENIPUNCTURE: CPT

## 2020-01-10 PROCEDURE — 84479 ASSAY OF THYROID (T3 OR T4): CPT

## 2020-01-11 LAB — T3RU NFR SERPL: 28 % (ref 24–39)

## 2020-01-14 ENCOUNTER — EVALUATION (OUTPATIENT)
Dept: PHYSICAL THERAPY | Facility: CLINIC | Age: 65
End: 2020-01-14
Payer: COMMERCIAL

## 2020-01-14 DIAGNOSIS — M47.816 LUMBAR SPONDYLOSIS: ICD-10-CM

## 2020-01-14 PROCEDURE — 97161 PT EVAL LOW COMPLEX 20 MIN: CPT | Performed by: PHYSICAL THERAPIST

## 2020-01-14 PROCEDURE — 97032 APPL MODALITY 1+ESTIM EA 15: CPT | Performed by: PHYSICAL THERAPIST

## 2020-01-14 PROCEDURE — 97110 THERAPEUTIC EXERCISES: CPT | Performed by: PHYSICAL THERAPIST

## 2020-01-14 PROCEDURE — 97035 APP MDLTY 1+ULTRASOUND EA 15: CPT | Performed by: PHYSICAL THERAPIST

## 2020-01-14 NOTE — PROGRESS NOTES
PT Evaluation     Today's date: 2020  Patient name: Rodney Kee  : 1955  MRN: [id number]  Referring provider: Kallie Ledbetter MD  Dx:   Encounter Diagnosis     ICD-10-CM    1  Lumbar spondylosis M47 816 Ambulatory referral to Physical Therapy       Start Time: 9506  Stop Time: 1230  Total time in clinic (min): 60 minutes    Assessment  Assessment details: Patient presents with chronic low back pain which recently worsened, demonstrates (+) R lumbar spasm, (+) tenderness L PSIS, decreased Arom and Strength, reports difficulty sitting, standing, walking for prolonged periods of time; patient would benefit from therapeutic exercise, manual therapy, patient education regarding proper lifting technique, modalities PRN; patient issued HEP  Impairments: abnormal muscle tone, abnormal or restricted ROM, impaired physical strength and pain with function  Understanding of Dx/Px/POC: good   Prognosis: good    Goals  STG  1  Decrease pain 50% in 2 weeks  2  Increase ROM to NL in 2 weeks  3  Increase Strength half grade in 2 weeks  4  Compliant with HEP in 2 weeks  LTG  1  Decrease pain to mild to none in 4 weeks  2  Increase Strength to NL in 4 weeks  3  Able to sit/stand for prolonged periods of time with mild to no difficulty in 4 weeks  4   Able to walk for prolonged periods of time with mild to no difficulty in 4 weeks    Plan  Patient would benefit from: skilled physical therapy  Planned modality interventions: unattended electrical stimulation, ultrasound and hydrotherapy  Planned therapy interventions: manual therapy, neuromuscular re-education, patient education, strengthening, stretching, therapeutic activities, therapeutic exercise, home exercise program and functional ROM exercises  Frequency: 2x week  Duration in weeks: 4        Subjective Evaluation    History of Present Illness  Mechanism of injury: Chronic, worsened recently          Recurrent probem    Quality of life: fair    Pain  Current pain ratin  At best pain ratin  At worst pain ratin  Quality: throbbing and radiating  Relieving factors: heat  Aggravating factors: standing, walking and sitting    Social Support  Steps to enter house: yes  Stairs in house: yes   Lives in: multiple-level home  Lives with: spouse    Employment status: working  Hand dominance: right      Diagnostic Tests  MRI studies: abnormal  Treatments  Previous treatment: physical therapy  Current treatment: medication  Patient Goals  Patient goals for therapy: decreased pain, increased strength, increased motion and return to work  Patient goal: stand, sit, walk for prolonged periods of time        Objective     Palpation     Right   Muscle spasm in the lumbar paraspinals  Tenderness     Left Hip   Tenderness in the PSIS       Active Range of Motion     Lumbar   Flexion:  Restriction level: minimal  Extension:  Restriction level: moderate  Left rotation:  Restriction level: minimal  Right rotation:  with pain Restriction level: minimal    Strength/Myotome Testing     Left Hip   Planes of Motion   Flexion: 4-    Right Hip   Planes of Motion   Flexion: 4-    Additional Strength Details  SLR 4+/5  Abdominals 4-/5      Flowsheet Rows      Most Recent Value   PT/OT G-Codes   Current Score  39   Projected Score  54             Precautions: none      Manual              MFR/IASTM                                                                     Exercise Diary              Prom/Stretch             Prone press ups x10            TM             Bike             Circuit - full             DKTC/LTR/Bridges with ball             Stance on foam             Stability disc             Bosu squats             CS/HS             Seated ball posture             Pt ed - HEP 10                                                                                                                        Modalities              E-Stim 15            US - cont 8            MH 15

## 2020-01-16 ENCOUNTER — APPOINTMENT (OUTPATIENT)
Dept: PHYSICAL THERAPY | Facility: CLINIC | Age: 65
End: 2020-01-16
Payer: COMMERCIAL

## 2020-01-21 ENCOUNTER — APPOINTMENT (OUTPATIENT)
Dept: PHYSICAL THERAPY | Facility: CLINIC | Age: 65
End: 2020-01-21
Payer: COMMERCIAL

## 2020-01-22 ENCOUNTER — OFFICE VISIT (OUTPATIENT)
Dept: PHYSICAL THERAPY | Facility: CLINIC | Age: 65
End: 2020-01-22
Payer: COMMERCIAL

## 2020-01-22 DIAGNOSIS — M47.816 LUMBAR SPONDYLOSIS: Primary | ICD-10-CM

## 2020-01-22 PROCEDURE — 97110 THERAPEUTIC EXERCISES: CPT

## 2020-01-22 PROCEDURE — 97014 ELECTRIC STIMULATION THERAPY: CPT

## 2020-01-22 PROCEDURE — G0283 ELEC STIM OTHER THAN WOUND: HCPCS

## 2020-01-22 PROCEDURE — 97035 APP MDLTY 1+ULTRASOUND EA 15: CPT

## 2020-01-22 NOTE — PROGRESS NOTES
Daily Note     Today's date: 2020  Patient name: Codie Garcia  : 1955  MRN: [id number]  Referring provider: Quang Casper MD  Dx:   Encounter Diagnosis     ICD-10-CM    1  Lumbar spondylosis M47 816        Start Time: 1005  Stop Time: 1055  Total time in clinic (min): 50 minutes    Subjective: Patient stated her back pain is 5/10  Difficulty sleeping due to back pain and spasms  Objective: See treatment diary below      Assessment: Applied MH/stim to back to decrease pain and tightness  US to LE for deeper heating and to enhance healing  Patient tolerated mat therex well without difficulty  Plan: Continue per plan of care        Precautions: none      Manual              MFR/IASTM                                                                     Exercise Diary             Prom/Stretch             Prone press ups x10 10x           TM             Bike             Circuit - full             DKTC/LTR/Bridges with ball  30x           Stance on foam             Stability disc             Bosu squats             CS/HS             Seated ball posture             Pt ed - HEP 10                                                                                                                        Modalities             E-Stim 15 20'           US - cont 8 10'           MH 15 20'

## 2020-01-23 ENCOUNTER — OFFICE VISIT (OUTPATIENT)
Dept: PHYSICAL THERAPY | Facility: CLINIC | Age: 65
End: 2020-01-23
Payer: COMMERCIAL

## 2020-01-23 DIAGNOSIS — M47.816 LUMBAR SPONDYLOSIS: Primary | ICD-10-CM

## 2020-01-23 PROCEDURE — 97110 THERAPEUTIC EXERCISES: CPT | Performed by: PHYSICAL THERAPIST

## 2020-01-23 PROCEDURE — G0283 ELEC STIM OTHER THAN WOUND: HCPCS | Performed by: PHYSICAL THERAPIST

## 2020-01-23 PROCEDURE — 97035 APP MDLTY 1+ULTRASOUND EA 15: CPT | Performed by: PHYSICAL THERAPIST

## 2020-01-23 PROCEDURE — 97014 ELECTRIC STIMULATION THERAPY: CPT | Performed by: PHYSICAL THERAPIST

## 2020-01-23 NOTE — PROGRESS NOTES
Daily Note     Today's date: 2020  Patient name: Negin Loya  : 1955  MRN: [id number]  Referring provider: Teri Santos MD  Dx:   Encounter Diagnosis     ICD-10-CM    1  Lumbar spondylosis M47 816        Start Time: 830  Stop Time: 930  Total time in clinic (min): 60 minutes    Subjective: patient reports back feels pretty good, treatment yesterday helped      Objective: See treatment diary below      Assessment: tolerated treatment without increase in pain, verbal reminders to engage core during ball glides provided       Plan: Continue per plan of care        Precautions: none      Manual              MFR/IASTM                                                                     Exercise Diary            Prom/Stretch             Prone press ups x10 10x 10x          TM             Bike             Circuit - full             DKTC/LTR/Bridges with ball  30x 30x          Stance on foam             Stability disc             Bosu squats             CS/HS             Seated ball posture             Pt ed - HEP 10                                                                                                                        Modalities            E-Stim 15 20' 20'          US - cont 8 10' 10'          MH 15 20' 20'

## 2020-01-27 ENCOUNTER — APPOINTMENT (OUTPATIENT)
Dept: PHYSICAL THERAPY | Facility: CLINIC | Age: 65
End: 2020-01-27
Payer: COMMERCIAL

## 2020-01-28 ENCOUNTER — APPOINTMENT (OUTPATIENT)
Dept: PHYSICAL THERAPY | Facility: CLINIC | Age: 65
End: 2020-01-28
Payer: COMMERCIAL

## 2020-01-30 ENCOUNTER — APPOINTMENT (OUTPATIENT)
Dept: PHYSICAL THERAPY | Facility: CLINIC | Age: 65
End: 2020-01-30
Payer: COMMERCIAL

## 2020-02-14 ENCOUNTER — OFFICE VISIT (OUTPATIENT)
Dept: PHYSICAL THERAPY | Facility: CLINIC | Age: 65
End: 2020-02-14
Payer: COMMERCIAL

## 2020-02-14 DIAGNOSIS — M47.816 LUMBAR SPONDYLOSIS: Primary | ICD-10-CM

## 2020-02-14 PROCEDURE — 97032 APPL MODALITY 1+ESTIM EA 15: CPT | Performed by: PHYSICAL THERAPIST

## 2020-02-14 PROCEDURE — 97140 MANUAL THERAPY 1/> REGIONS: CPT | Performed by: PHYSICAL THERAPIST

## 2020-02-14 PROCEDURE — 97110 THERAPEUTIC EXERCISES: CPT | Performed by: PHYSICAL THERAPIST

## 2020-02-14 NOTE — PROGRESS NOTES
RE-Certification     Today's date: 2020  Patient name: Daniela Nolasco  : 1955  MRN: [id number]  Referring provider: Medardo Ang MD  Dx:   Encounter Diagnosis     ICD-10-CM    1  Lumbar spondylosis M47 816        Start Time: 930  Stop Time: 103  Total time in clinic (min): 60 minutes    Subjective: patient reports low back pain 6/10 today    Objective: Arom lumbar WNL                     Strength BL hip flexion /5                                    Abdominals 4/5    Assessment: patient demonstrates increased Arom and Strength, able to sit without difficulty, remaining difficulty standing/walking; pain level remains the same      Plan: Continue per plan of care   2xs/week - 4 weeks; STGs 75% met, LTGs 25% met     Precautions: none      Manual              MFR/IASTM 10'                                                                    Exercise Diary           Prom/Stretch             Prone press ups x10 10x 10x 10x         TM             Bike    15'         Circuit - full             DKTC/LTR/Bridges with ball  30x 30x 30x         Stance on foam             Stability disc             Bosu squats             CS/HS             Seated ball posture             Pt ed - HEP 10                                                                                                                        Modalities           E-Stim 15 20' 20' 20'         US - cont 8 10' 10'          MH 15 20' 20' 20'

## 2020-02-21 ENCOUNTER — APPOINTMENT (OUTPATIENT)
Dept: PHYSICAL THERAPY | Facility: CLINIC | Age: 65
End: 2020-02-21
Payer: COMMERCIAL

## 2020-02-28 ENCOUNTER — OFFICE VISIT (OUTPATIENT)
Dept: PHYSICAL THERAPY | Facility: CLINIC | Age: 65
End: 2020-02-28
Payer: COMMERCIAL

## 2020-02-28 DIAGNOSIS — M47.816 LUMBAR SPONDYLOSIS: Primary | ICD-10-CM

## 2020-02-28 PROCEDURE — 97110 THERAPEUTIC EXERCISES: CPT

## 2020-02-28 PROCEDURE — 97014 ELECTRIC STIMULATION THERAPY: CPT

## 2020-02-28 PROCEDURE — 97112 NEUROMUSCULAR REEDUCATION: CPT

## 2020-02-28 PROCEDURE — G0283 ELEC STIM OTHER THAN WOUND: HCPCS

## 2020-02-28 NOTE — PROGRESS NOTES
Daily Note     Today's date: 2020  Patient name: Heather Pugh  : 1955  MRN: [id number]  Referring provider: Billie Cassidy MD  Dx:   Encounter Diagnosis     ICD-10-CM    1  Lumbar spondylosis M47 816                   Subjective: Pt reports 8/10 pain in lumbar spine; most discomfort on L side and some radicular symptoms on L side  Objective: See treatment diary below      Assessment: Began with MFR to lumbar musculature to decrease tissue tightness most present on R aspect f/b MH and e-stim to lumbar spine to decrease pain and further relax musculature  Pt tolerated exercises well; cueing for TA draw in during all activities  Pt educated to continue with HEP over the weekend  Plan: Continue with current POC to address pt deficits        Precautions: none      Manual             MFR/IASTM 10' 10'                                                                   Exercise Diary          Prom/Stretch             Prone press ups x10 10x 10x 10x         TM             Bike    15' 10'        Circuit - full             DKTC/LTR/Bridges with ball  30x 30x 30x 30x ea        Stance on foam             Stability disc             Bosu squats             CS/HS             Seated ball posture             Pt ed - HEP 10                                                                                                                        Modalities          E-Stim 15 20' 20' 20' 20'        US - cont 8 10' 10'          MH 15 20' 20' 20' 20'

## 2020-03-02 ENCOUNTER — OFFICE VISIT (OUTPATIENT)
Dept: FAMILY MEDICINE CLINIC | Facility: CLINIC | Age: 65
End: 2020-03-02
Payer: COMMERCIAL

## 2020-03-02 ENCOUNTER — APPOINTMENT (OUTPATIENT)
Dept: PHYSICAL THERAPY | Facility: CLINIC | Age: 65
End: 2020-03-02
Payer: COMMERCIAL

## 2020-03-02 VITALS
DIASTOLIC BLOOD PRESSURE: 74 MMHG | TEMPERATURE: 96.5 F | OXYGEN SATURATION: 97 % | BODY MASS INDEX: 34.39 KG/M2 | HEIGHT: 66 IN | SYSTOLIC BLOOD PRESSURE: 116 MMHG | WEIGHT: 214 LBS | HEART RATE: 80 BPM

## 2020-03-02 DIAGNOSIS — M47.816 LUMBAR SPONDYLOSIS: ICD-10-CM

## 2020-03-02 DIAGNOSIS — K21.9 GASTROESOPHAGEAL REFLUX DISEASE, ESOPHAGITIS PRESENCE NOT SPECIFIED: ICD-10-CM

## 2020-03-02 DIAGNOSIS — M25.552 PAIN OF LEFT HIP JOINT: ICD-10-CM

## 2020-03-02 DIAGNOSIS — E03.9 HYPOTHYROIDISM, UNSPECIFIED TYPE: ICD-10-CM

## 2020-03-02 DIAGNOSIS — R53.83 OTHER FATIGUE: ICD-10-CM

## 2020-03-02 DIAGNOSIS — M54.16 LUMBAR RADICULITIS: Primary | ICD-10-CM

## 2020-03-02 PROCEDURE — 1036F TOBACCO NON-USER: CPT | Performed by: PHYSICIAN ASSISTANT

## 2020-03-02 PROCEDURE — 99213 OFFICE O/P EST LOW 20 MIN: CPT | Performed by: PHYSICIAN ASSISTANT

## 2020-03-02 PROCEDURE — 3008F BODY MASS INDEX DOCD: CPT | Performed by: PHYSICIAN ASSISTANT

## 2020-03-02 RX ORDER — OMEPRAZOLE 40 MG/1
40 CAPSULE, DELAYED RELEASE ORAL
Qty: 30 CAPSULE | Refills: 0 | Status: SHIPPED | OUTPATIENT
Start: 2020-03-02 | End: 2020-04-06 | Stop reason: SDUPTHER

## 2020-03-02 NOTE — PROGRESS NOTES
Assessment/Plan:  BMI Counseling: Body mass index is 34 54 kg/m²  The BMI is above normal  Nutrition recommendations include decreasing portion sizes, encouraging healthy choices of fruits and vegetables, consuming healthier snacks, limiting drinks that contain sugar, moderation in carbohydrate intake, increasing intake of lean protein and reducing intake of cholesterol  Exercise recommendations include exercising 3-5 times per week  No pharmacotherapy was ordered  Diagnoses and all orders for this visit:    Lumbar radiculitis    Lumbar spondylosis    Pain of left hip joint    Hypothyroidism, unspecified type    Other fatigue        Patient is to keep doing physical therapy for now  She is to get the TSH that is been ordered for her  She is to keep her sleep study appointment  Subjective:      Patient ID: Aj Sweet is a 63 y o  female  Patient is still undergoing physical therapy for her back  She is still having frequent episodes of back pain with pain going down her left leg  She states she does have a problem if she has to stand for long periods of time  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  She has 2 more weeks of physical therapy  If they feel they need to extend it she is to do so  If they feel they have gotten is far as they can and she is still having pain I would suggest pain management  Patient is also concerned because of her weight gain and not being able to exercise during this time  I did discuss with her to ask PT if there is anything she could do at home that would not aggravate her back  She is due for follow-up thyroid testing also  Patient has a sleep study scheduled  She states last night she slept for long period of time and does not wake up but still feels on refreshed        The following portions of the patient's history were reviewed and updated as appropriate:   She has a past medical history of Anxiety, Chronic kidney disease, Depression, Disease of thyroid gland, Dysuria, Endometriosis, Gout, and History of fractured kneecap ,  does not have any pertinent problems on file  ,   has a past surgical history that includes Other surgical history; Inguinal hernia repair; Other surgical history; pr colonoscopy flx dx w/collj spec when pfrmd (N/A, 7/12/2018); Oophorectomy (2004); and Colonoscopy  ,  family history includes Alcohol abuse in her father; Atrial fibrillation in her mother; Dementia in her mother; Depression in her mother; Heart attack in her father, maternal grandfather, and paternal grandfather; No Known Problems in her daughter, daughter, maternal aunt, maternal aunt, paternal aunt, and sister; Other in her maternal grandmother; Stroke in her paternal grandmother  ,   reports that she has never smoked  She has never used smokeless tobacco  She reports that she does not drink alcohol or use drugs  ,  has No Known Allergies     Current Outpatient Medications   Medication Sig Dispense Refill    ALPRAZolam (XANAX) 0 5 mg tablet Take 1 tablet (0 5 mg total) by mouth 2 (two) times a day 60 tablet 1    desvenlafaxine (PRISTIQ) 100 mg 24 hr tablet       gabapentin (NEURONTIN) 300 mg capsule Take 1 capsule (300 mg total) by mouth daily at bedtime 30 capsule 5    levothyroxine 25 mcg tablet Take 1 tablet (25 mcg total) by mouth daily 90 tablet 5    MYDAYIS 50 MG CP24 Take 1 capsule by mouth daily  0    naproxen (NAPROSYN) 500 mg tablet Take 1 tablet (500 mg total) by mouth 2 (two) times a day with meals 60 tablet 5    omeprazole (PriLOSEC) 40 MG capsule Take 1 capsule (40 mg total) by mouth daily before breakfast 30 capsule 5     No current facility-administered medications for this visit  Review of Systems   Constitutional: Positive for activity change and fatigue  Negative for appetite change  HENT: Negative for congestion, ear pain, postnasal drip, sneezing, sore throat and trouble swallowing      Respiratory: Negative for chest tightness and shortness of breath  Cardiovascular: Negative for chest pain and palpitations  Gastrointestinal: Negative for abdominal pain, constipation, diarrhea and nausea  Endocrine: Negative for cold intolerance and heat intolerance  Genitourinary: Negative for difficulty urinating  Musculoskeletal: Positive for back pain  Negative for neck pain and neck stiffness  Skin: Negative for rash  Neurological: Positive for numbness  Negative for dizziness, light-headedness and headaches  Objective:  Vitals:    03/02/20 0817   BP: 116/74   Pulse: 80   Temp: (!) 96 5 °F (35 8 °C)   SpO2: 97%   Weight: 97 1 kg (214 lb)   Height: 5' 6" (1 676 m)     Body mass index is 34 54 kg/m²  Physical Exam   Constitutional: She is oriented to person, place, and time  She appears well-developed and well-nourished  HENT:   Head: Normocephalic  Right Ear: External ear normal    Left Ear: External ear normal    Eyes: Conjunctivae are normal    Neck: Normal range of motion  Cardiovascular: Normal rate, regular rhythm and normal heart sounds  Pulmonary/Chest: Effort normal and breath sounds normal    Musculoskeletal: She exhibits tenderness  She exhibits no edema  Neurological: She is alert and oriented to person, place, and time  Skin: Skin is warm and dry  Psychiatric: She has a normal mood and affect  Her behavior is normal    Nursing note and vitals reviewed

## 2020-03-10 ENCOUNTER — OFFICE VISIT (OUTPATIENT)
Dept: PHYSICAL THERAPY | Facility: CLINIC | Age: 65
End: 2020-03-10
Payer: COMMERCIAL

## 2020-03-10 DIAGNOSIS — M47.816 LUMBAR SPONDYLOSIS: Primary | ICD-10-CM

## 2020-03-10 PROCEDURE — 97110 THERAPEUTIC EXERCISES: CPT | Performed by: PHYSICAL THERAPIST

## 2020-03-10 PROCEDURE — 97112 NEUROMUSCULAR REEDUCATION: CPT | Performed by: PHYSICAL THERAPIST

## 2020-03-10 NOTE — PROGRESS NOTES
Daily Note     Today's date: 3/10/2020  Patient name: Franco Bro  : 1955  MRN: [id number]  Referring provider: Gage Benitez MD  Dx:   Encounter Diagnosis     ICD-10-CM    1  Lumbar spondylosis M47 [id number]                   Subjective: Pt reports radicular pain from left paraspinal into posterior thigh          Objective: See treatment diary below      Assessment: Added TE to stretch possible nerve involvement  Complaints of pain with passive hip IR and SLR indicating possible hip OA  Discussed surgical options  Soreness in hip post treatment  Plan: Continue with current POC to address pt deficits        Precautions: none      Manual             MFR/IASTM 10' 10'                                                                   Exercise Diary  1/14 1/22 1/23 2/14 2/28 3/10       Prom/Stretch             Prone press ups x10 10x 10x 10x         TM             Bike    15' 10' 13       Circuit - full      20       DKTC/LTR/Bridges with ball  30x 30x 30x 30x ea        Stance on foam             Stability disc             Bosu squats             CS/HS      10       Seated ball posture             Pt ed - HEP 10            Home stretches      15'                                                                                                      Modalities          E-Stim 15 20' 20' 20' 20'        US - cont 8 10' 10'          MH 15 20' 20' 20' 20'

## 2020-03-12 ENCOUNTER — OFFICE VISIT (OUTPATIENT)
Dept: PULMONOLOGY | Facility: CLINIC | Age: 65
End: 2020-03-12
Payer: COMMERCIAL

## 2020-03-12 ENCOUNTER — APPOINTMENT (OUTPATIENT)
Dept: PHYSICAL THERAPY | Facility: CLINIC | Age: 65
End: 2020-03-12
Payer: COMMERCIAL

## 2020-03-12 VITALS
OXYGEN SATURATION: 98 % | DIASTOLIC BLOOD PRESSURE: 84 MMHG | HEIGHT: 66 IN | HEART RATE: 78 BPM | SYSTOLIC BLOOD PRESSURE: 130 MMHG | WEIGHT: 214 LBS | BODY MASS INDEX: 34.39 KG/M2

## 2020-03-12 DIAGNOSIS — E66.9 OBESITY (BMI 30.0-34.9): ICD-10-CM

## 2020-03-12 DIAGNOSIS — G47.19 EXCESSIVE DAYTIME SLEEPINESS: ICD-10-CM

## 2020-03-12 DIAGNOSIS — G47.33 OSA (OBSTRUCTIVE SLEEP APNEA): Primary | ICD-10-CM

## 2020-03-12 PROCEDURE — 3008F BODY MASS INDEX DOCD: CPT | Performed by: PHYSICIAN ASSISTANT

## 2020-03-12 PROCEDURE — 99244 OFF/OP CNSLTJ NEW/EST MOD 40: CPT | Performed by: PHYSICIAN ASSISTANT

## 2020-03-12 PROCEDURE — 1036F TOBACCO NON-USER: CPT | Performed by: PHYSICIAN ASSISTANT

## 2020-03-12 NOTE — PROGRESS NOTES
Assessment/Plan:     Diagnoses and all orders for this visit:    RUBEN (obstructive sleep apnea)  -     Diagnostic Sleep Study; Future    Excessive daytime sleepiness    Obesity (BMI 30 0-34  9)    Patient here today for sleep evaluation  She has signs and symptoms of sleep apnea with snoring, night time awakenings, apneic episodes, excessive daytime sleepiness, body habitus  Discussed the etiology and pathogenesis of RUBEN, risks of untreated sleep apnea including arrhythmia, MI stroke  Will send her for a diagnostic sleep study, if any evidence of sleep apnea will order her an auto-CPAP       She would follow up in 3 months or sooner if necessary  Return in about 3 months (around 6/12/2020)  All questions are answered to the patient's satisfaction and understanding  She verbalizes understanding  She is encouraged to call with any further questions or concerns  Portions of the record may have been created with voice recognition software  Occasional wrong word or "sound a like" substitutions may have occurred due to the inherent limitations of voice recognition software  Read the chart carefully and recognize, using context, where substitutions have occurred  a    Electronically Signed by Kristyn Gómez PA-C    ______________________________________________________________________    Chief Complaint:   Chief Complaint   Patient presents with    Sleep Apnea     ref dr Maira Sampson         Patient ID: Kesha Chavez is a 63 y o  y o  female has a past medical history of Anxiety, Chronic kidney disease, Depression, Disease of thyroid gland, Dysuria, Endometriosis, Gout, and History of fractured kneecap  3/12/2020  Patient presents today for initial visit  Patient is a 62 yo female nonsmoker with PMH of hypothyroid, GERD  She was referred to us for a sleep apnea evaluation  She reports snoring at night, wakes several times during the night feels she only sleeps for 1-2 hours at a time    She will wake up to use the restroom, does also sometimes wake up feeling that she cannot breathe  She has excessive daytime sleepiness  Denies any chronic headaches or any problems with her memory or concentration  Occupational/Exposure history:  Pets/Enviroment:  Travel history:  Review of Systems   Constitutional: Positive for fatigue  Daytime sleepiness   HENT: Negative  Respiratory: Positive for apnea  Snoring    Cardiovascular: Negative  Gastrointestinal: Negative  Genitourinary: Negative  Musculoskeletal: Negative  Skin: Negative  Allergic/Immunologic: Negative  Neurological: Negative  Psychiatric/Behavioral: Negative  Social history: She reports that she has never smoked  She has never used smokeless tobacco  She reports that she does not drink alcohol or use drugs  Past surgical history:   Past Surgical History:   Procedure Laterality Date    COLONOSCOPY      INGUINAL HERNIA REPAIR      LAST ASSESSED [id number]    OOPHORECTOMY  2004    thinks right side    OTHER SURGICAL HISTORY      FACE SURGERY  -- LAST ASSESSED [id number]    OTHER SURGICAL HISTORY      stiches in chin due to accident     [place] COLONOSCOPY FLX DX W/COLLJ SPEC WHEN PFRMD N/A 7/12/2018    Procedure: EGD AND COLONOSCOPY;  Surgeon: Cash Whitman MD;  Location: [facility];   Service: Gastroenterology     Family history:   Family History   Problem Relation Age of Onset    Atrial fibrillation Mother     Depression Mother     Dementia Mother     Alcohol abuse Father     Heart attack Father     Other Maternal Grandmother         BLOOD DISORDER    Heart attack Maternal Grandfather     Stroke Paternal Grandmother     Heart attack Paternal Grandfather     No Known Problems Sister     No Known Problems Daughter     No Known Problems Daughter     No Known Problems Maternal Aunt     No Known Problems Maternal Aunt     No Known Problems Paternal Aunt     Breast cancer Neg Hx        Immunization History Administered Date(s) Administered    INFLUENZA 08/19/2016    Influenza Quadrivalent Preservative Free 3 years and older IM 12/16/2016    Zoster 08/19/2016     Current Outpatient Medications   Medication Sig Dispense Refill    ALPRAZolam (XANAX) 0 5 mg tablet Take 1 tablet (0 5 mg total) by mouth 2 (two) times a day 60 tablet 1    desvenlafaxine (PRISTIQ) 100 mg 24 hr tablet       levothyroxine 25 mcg tablet Take 1 tablet (25 mcg total) by mouth daily 90 tablet 5    MYDAYIS 50 MG CP24 Take 1 capsule by mouth daily  0    naproxen (NAPROSYN) 500 mg tablet Take 1 tablet (500 mg total) by mouth 2 (two) times a day with meals 60 tablet 5    omeprazole (PriLOSEC) 40 MG capsule Take 1 capsule (40 mg total) by mouth daily before breakfast 30 capsule 0    gabapentin (NEURONTIN) 300 mg capsule Take 1 capsule (300 mg total) by mouth daily at bedtime (Patient not taking: Reported on 3/12/2020) 30 capsule 5     No current facility-administered medications for this visit  Allergies: Patient has no known allergies  Objective:  Vitals:    03/12/20 1339   BP: 130/84   Pulse: 78   SpO2: 98%   Weight: 97 1 kg (214 lb)   Height: 5' 6" (1 676 m)   Oxygen Therapy  SpO2: 98 %    Wt Readings from Last 3 Encounters:   03/12/20 97 1 kg (214 lb)   03/02/20 97 1 kg (214 lb)   12/31/19 94 8 kg (209 lb)     Body mass index is 34 54 kg/m²  Physical Exam   Constitutional: She is oriented to person, place, and time  She appears well-developed and well-nourished  No distress  HENT:   Mouth/Throat: Oropharynx is clear and moist    Crowded oropharyngeal airway   Eyes: Pupils are equal, round, and reactive to light  Cardiovascular: Normal rate, regular rhythm and normal heart sounds  No murmur heard  Pulmonary/Chest: Effort normal and breath sounds normal  No accessory muscle usage  No respiratory distress  She has no decreased breath sounds  She has no wheezes  She has no rhonchi  She has no rales  Abdominal: Soft  There is no tenderness  Musculoskeletal: Normal range of motion  Neurological: She is alert and oriented to person, place, and time  Skin: Skin is warm and dry  No rash noted  Psychiatric: She has a normal mood and affect  Lab Review:   Lab Results   Component Value Date    K 3 9 11/07/2019     11/07/2019    CO2 30 11/07/2019    BUN 14 11/07/2019    CREATININE 0 67 11/07/2019    CALCIUM 9 1 11/07/2019     Lab Results   Component Value Date    WBC 5 17 11/07/2019    HGB 14 0 11/07/2019    HCT 40 9 11/07/2019    MCV 93 11/07/2019     11/07/2019       Diagnostics:    none pertinent  Office Spirometry Results:     ESS: Total score: 9  No results found

## 2020-03-23 ENCOUNTER — TELEPHONE (OUTPATIENT)
Dept: FAMILY MEDICINE CLINIC | Facility: CLINIC | Age: 65
End: 2020-03-23

## 2020-04-06 DIAGNOSIS — K21.9 GASTROESOPHAGEAL REFLUX DISEASE, ESOPHAGITIS PRESENCE NOT SPECIFIED: ICD-10-CM

## 2020-04-06 RX ORDER — OMEPRAZOLE 40 MG/1
40 CAPSULE, DELAYED RELEASE ORAL
Qty: 30 CAPSULE | Refills: 0 | Status: SHIPPED | OUTPATIENT
Start: 2020-04-06 | End: 2020-04-08 | Stop reason: SDUPTHER

## 2020-04-08 DIAGNOSIS — K21.9 GASTROESOPHAGEAL REFLUX DISEASE, ESOPHAGITIS PRESENCE NOT SPECIFIED: ICD-10-CM

## 2020-04-09 RX ORDER — OMEPRAZOLE 40 MG/1
40 CAPSULE, DELAYED RELEASE ORAL
Qty: 30 CAPSULE | Refills: 0 | Status: SHIPPED | OUTPATIENT
Start: 2020-04-09 | End: 2020-06-12 | Stop reason: SDUPTHER

## 2020-04-16 ENCOUNTER — TELEPHONE (OUTPATIENT)
Dept: PULMONOLOGY | Facility: CLINIC | Age: 65
End: 2020-04-16

## 2020-05-22 ENCOUNTER — TELEMEDICINE (OUTPATIENT)
Dept: FAMILY MEDICINE CLINIC | Facility: CLINIC | Age: 65
End: 2020-05-22
Payer: COMMERCIAL

## 2020-05-22 DIAGNOSIS — E03.9 HYPOTHYROIDISM, UNSPECIFIED TYPE: Primary | ICD-10-CM

## 2020-05-22 DIAGNOSIS — M54.16 LUMBAR RADICULITIS: ICD-10-CM

## 2020-05-22 PROCEDURE — 99213 OFFICE O/P EST LOW 20 MIN: CPT | Performed by: PHYSICIAN ASSISTANT

## 2020-06-12 DIAGNOSIS — K21.9 GASTROESOPHAGEAL REFLUX DISEASE, ESOPHAGITIS PRESENCE NOT SPECIFIED: ICD-10-CM

## 2020-06-12 RX ORDER — OMEPRAZOLE 40 MG/1
40 CAPSULE, DELAYED RELEASE ORAL
Qty: 30 CAPSULE | Refills: 0 | Status: SHIPPED | OUTPATIENT
Start: 2020-06-12 | End: 2020-07-17 | Stop reason: SDUPTHER

## 2020-06-29 ENCOUNTER — TELEPHONE (OUTPATIENT)
Dept: PULMONOLOGY | Facility: CLINIC | Age: 65
End: 2020-06-29

## 2020-07-17 DIAGNOSIS — K21.9 GASTROESOPHAGEAL REFLUX DISEASE, ESOPHAGITIS PRESENCE NOT SPECIFIED: ICD-10-CM

## 2020-07-17 RX ORDER — OMEPRAZOLE 40 MG/1
40 CAPSULE, DELAYED RELEASE ORAL
Qty: 30 CAPSULE | Refills: 0 | Status: SHIPPED | OUTPATIENT
Start: 2020-07-17 | End: 2020-09-30 | Stop reason: SDUPTHER

## 2020-07-29 ENCOUNTER — APPOINTMENT (OUTPATIENT)
Dept: LAB | Facility: HOSPITAL | Age: 65
End: 2020-07-29
Attending: FAMILY MEDICINE
Payer: COMMERCIAL

## 2020-07-29 DIAGNOSIS — E03.9 HYPOTHYROIDISM, UNSPECIFIED TYPE: ICD-10-CM

## 2020-07-29 LAB
T4 FREE SERPL-MCNC: 0.84 NG/DL (ref 0.76–1.46)
TSH SERPL DL<=0.05 MIU/L-ACNC: 4.97 UIU/ML (ref 0.36–3.74)

## 2020-07-29 PROCEDURE — 84443 ASSAY THYROID STIM HORMONE: CPT

## 2020-07-29 PROCEDURE — 84439 ASSAY OF FREE THYROXINE: CPT

## 2020-07-29 PROCEDURE — 36415 COLL VENOUS BLD VENIPUNCTURE: CPT

## 2020-09-28 ENCOUNTER — TELEMEDICINE (OUTPATIENT)
Dept: FAMILY MEDICINE CLINIC | Facility: CLINIC | Age: 65
End: 2020-09-28
Payer: COMMERCIAL

## 2020-09-28 DIAGNOSIS — B34.9 VIRAL SYNDROME: ICD-10-CM

## 2020-09-28 DIAGNOSIS — B34.9 VIRAL SYNDROME: Primary | ICD-10-CM

## 2020-09-28 PROCEDURE — U0003 INFECTIOUS AGENT DETECTION BY NUCLEIC ACID (DNA OR RNA); SEVERE ACUTE RESPIRATORY SYNDROME CORONAVIRUS 2 (SARS-COV-2) (CORONAVIRUS DISEASE [COVID-19]), AMPLIFIED PROBE TECHNIQUE, MAKING USE OF HIGH THROUGHPUT TECHNOLOGIES AS DESCRIBED BY CMS-2020-01-R: HCPCS | Performed by: STUDENT IN AN ORGANIZED HEALTH CARE EDUCATION/TRAINING PROGRAM

## 2020-09-28 PROCEDURE — 99213 OFFICE O/P EST LOW 20 MIN: CPT | Performed by: STUDENT IN AN ORGANIZED HEALTH CARE EDUCATION/TRAINING PROGRAM

## 2020-09-28 NOTE — PROGRESS NOTES
COVID-19 Virtual Visit     Assessment/Plan:    Problem List Items Addressed This Visit     None      Visit Diagnoses     Viral syndrome    -  Primary    Relevant Orders    Novel Coronavirus (COVID-19), PCR [facility] - Collected at [facility] or [facility]        This virtual check-in was done via "[employer] and patient was informed that this is not a secure, HIPAA-complaint platform  She agrees to proceed     Disposition:      I referred Chintan Vargas to one of our centralized sites for a COVID-19 swab    I spent 15 minutes directly with the patient during this visit    Encounter provider Chad Butterfield MD    Provider located at [facility] [address]  [address]  [phone]    Recent Visits  No visits were found meeting these conditions  Showing recent visits within past 7 days and meeting all other requirements     Today's Visits  Date Type Provider Dept   09/28/20 Telemedicine Chad Butterfield MD [facility] today's visits and meeting all other requirements     Future Appointments  No visits were found meeting these conditions  Showing future appointments within next 150 days and meeting all other requirements        Patient agrees to participate in a virtual check in via telephone or video visit instead of presenting to the office to address urgent/immediate medical needs  Patient is aware this is a billable service  After connecting through [employer], the patient was identified by name and date of birth  Nori Whalen was informed that this was a telemedicine visit and that the exam was being conducted confidentially over secure lines  My office door was closed  No one else was in the room  Nori Whalen acknowledged consent and understanding of privacy and security of the telemedicine visit    I informed the patient that I have reviewed her record in Epic and presented the opportunity for her to ask any questions regarding the visit today  The patient agreed to participate  Subjective  Spencer Barragan is a 63 y o  female who is concerned about COVID-19  She reports fever, chills, cough, shortness of breath, headache, sore throat, fatigue and myalgias  She has not had contact with a person who is under investigation for or who is positive for COVID-19 within the last 14 days  She has not been hospitalized recently for fever and/or lower respiratory symptoms  Past Medical History:   Diagnosis Date    Anxiety     Chronic kidney disease     renal sac    Depression     Disease of thyroid gland     Dysuria     LAST ASSESSED [id number]    Endometriosis     Gout     History of fractured kneecap     hairline fracture right knee due to accident        Past Surgical History:   Procedure Laterality Date    COLONOSCOPY      INGUINAL HERNIA REPAIR      LAST ASSESSED [id number]    OOPHORECTOMY  2004    thinks right side    OTHER SURGICAL HISTORY      FACE SURGERY  -- LAST ASSESSED [id number]    OTHER SURGICAL HISTORY      stiches in chin due to accident     [place] COLONOSCOPY FLX DX W/COLLJ SPEC WHEN PFRMD N/A 7/12/2018    Procedure: EGD AND COLONOSCOPY;  Surgeon: Charlene Smith MD;  Location: [facility];   Service: Gastroenterology       Current Outpatient Medications   Medication Sig Dispense Refill    ALPRAZolam (XANAX) 0 5 mg tablet Take 1 tablet (0 5 mg total) by mouth 2 (two) times a day 60 tablet 1    desvenlafaxine (PRISTIQ) 100 mg 24 hr tablet       gabapentin (NEURONTIN) 300 mg capsule Take 1 capsule (300 mg total) by mouth daily at bedtime 30 capsule 5    levothyroxine 25 mcg tablet Take 1 tablet (25 mcg total) by mouth daily 90 tablet 5    MYDAYIS 50 MG CP24 Take 1 capsule by mouth daily  0    naproxen (NAPROSYN) 500 mg tablet Take 1 tablet (500 mg total) by mouth 2 (two) times a day with meals 60 tablet 5    omeprazole (PriLOSEC) 40 MG capsule Take 1 capsule (40 mg total) by mouth daily before breakfast 30 capsule 0     No current facility-administered medications for this visit  No Known Allergies    Review of Systems   Constitutional: Positive for activity change, appetite change, chills, diaphoresis, fatigue and fever (low temps)  HENT: Positive for congestion, postnasal drip, rhinorrhea and sore throat  Respiratory: Positive for cough  Negative for shortness of breath  Video Exam    There were no vitals filed for this visit  Lidya Guevara appears healthy  Physical Exam  Constitutional:       General: She is not in acute distress  Appearance: Normal appearance  She is not ill-appearing, toxic-appearing or diaphoretic  HENT:      Head: Normocephalic and atraumatic  Pulmonary:      Effort: Pulmonary effort is normal    Neurological:      General: No focal deficit present  Mental Status: She is alert and oriented to person, place, and time  Mental status is at baseline  Psychiatric:         Mood and Affect: Mood normal          Behavior: Behavior normal           VIRTUAL VISIT DISCLAIMER    Gregorio Bailon acknowledges that she has consented to an online visit or consultation  She understands that the online visit is based solely on information provided by her, and that, in the absence of a face-to-face physical evaluation by the physician, the diagnosis she receives is both limited and provisional in terms of accuracy and completeness  This is not intended to replace a full medical face-to-face evaluation by the physician  Gregorio Bailon understands and accepts these terms

## 2020-09-29 LAB — SARS-COV-2 RNA SPEC QL NAA+PROBE: NOT DETECTED

## 2020-09-30 ENCOUNTER — OFFICE VISIT (OUTPATIENT)
Dept: FAMILY MEDICINE CLINIC | Facility: CLINIC | Age: 65
End: 2020-09-30
Payer: COMMERCIAL

## 2020-09-30 ENCOUNTER — DOCUMENTATION (OUTPATIENT)
Dept: FAMILY MEDICINE CLINIC | Facility: CLINIC | Age: 65
End: 2020-09-30

## 2020-09-30 VITALS
HEIGHT: 66 IN | OXYGEN SATURATION: 97 % | BODY MASS INDEX: 33.17 KG/M2 | WEIGHT: 206.4 LBS | HEART RATE: 88 BPM | TEMPERATURE: 98.4 F

## 2020-09-30 DIAGNOSIS — F41.9 ANXIETY: ICD-10-CM

## 2020-09-30 DIAGNOSIS — K21.9 GASTROESOPHAGEAL REFLUX DISEASE, ESOPHAGITIS PRESENCE NOT SPECIFIED: ICD-10-CM

## 2020-09-30 DIAGNOSIS — B34.9 VIRAL SYNDROME: ICD-10-CM

## 2020-09-30 DIAGNOSIS — Z23 ENCOUNTER FOR IMMUNIZATION: Primary | ICD-10-CM

## 2020-09-30 DIAGNOSIS — E03.9 HYPOTHYROIDISM, UNSPECIFIED TYPE: ICD-10-CM

## 2020-09-30 DIAGNOSIS — K80.20 GALLBLADDER COLIC: ICD-10-CM

## 2020-09-30 PROCEDURE — 1036F TOBACCO NON-USER: CPT | Performed by: STUDENT IN AN ORGANIZED HEALTH CARE EDUCATION/TRAINING PROGRAM

## 2020-09-30 PROCEDURE — 90471 IMMUNIZATION ADMIN: CPT

## 2020-09-30 PROCEDURE — 90682 RIV4 VACC RECOMBINANT DNA IM: CPT

## 2020-09-30 PROCEDURE — 99214 OFFICE O/P EST MOD 30 MIN: CPT | Performed by: STUDENT IN AN ORGANIZED HEALTH CARE EDUCATION/TRAINING PROGRAM

## 2020-09-30 RX ORDER — ALPRAZOLAM 0.5 MG/1
0.5 TABLET ORAL 2 TIMES DAILY
Qty: 60 TABLET | Refills: 1 | Status: CANCELLED | OUTPATIENT
Start: 2020-09-30

## 2020-09-30 RX ORDER — OMEPRAZOLE 40 MG/1
40 CAPSULE, DELAYED RELEASE ORAL
Qty: 30 CAPSULE | Refills: 0 | Status: SHIPPED | OUTPATIENT
Start: 2020-09-30 | End: 2020-12-31 | Stop reason: SDUPTHER

## 2020-09-30 RX ORDER — LEVOTHYROXINE SODIUM 0.05 MG/1
50 TABLET ORAL DAILY
Qty: 90 TABLET | Refills: 0 | Status: SHIPPED | OUTPATIENT
Start: 2020-09-30 | End: 2021-01-23 | Stop reason: SDUPTHER

## 2020-12-04 DIAGNOSIS — F41.9 ANXIETY: ICD-10-CM

## 2020-12-04 RX ORDER — ALPRAZOLAM 0.5 MG/1
0.5 TABLET ORAL 2 TIMES DAILY
Qty: 60 TABLET | Refills: 1 | Status: SHIPPED | OUTPATIENT
Start: 2020-12-04

## 2020-12-11 ENCOUNTER — VBI (OUTPATIENT)
Dept: ADMINISTRATIVE | Facility: OTHER | Age: 65
End: 2020-12-11

## 2020-12-31 DIAGNOSIS — K21.9 GASTROESOPHAGEAL REFLUX DISEASE: ICD-10-CM

## 2020-12-31 RX ORDER — OMEPRAZOLE 40 MG/1
40 CAPSULE, DELAYED RELEASE ORAL
Qty: 30 CAPSULE | Refills: 0 | Status: SHIPPED | OUTPATIENT
Start: 2020-12-31 | End: 2021-03-22 | Stop reason: SDUPTHER

## 2021-01-06 ENCOUNTER — TELEMEDICINE (OUTPATIENT)
Dept: FAMILY MEDICINE CLINIC | Facility: CLINIC | Age: 66
End: 2021-01-06
Payer: COMMERCIAL

## 2021-01-06 VITALS — HEART RATE: 54 BPM | OXYGEN SATURATION: 99 % | TEMPERATURE: 97.1 F

## 2021-01-06 DIAGNOSIS — B34.9 VIRAL INFECTION, UNSPECIFIED: Primary | ICD-10-CM

## 2021-01-06 PROCEDURE — U0005 INFEC AGEN DETEC AMPLI PROBE: HCPCS | Performed by: PHYSICIAN ASSISTANT

## 2021-01-06 PROCEDURE — U0003 INFECTIOUS AGENT DETECTION BY NUCLEIC ACID (DNA OR RNA); SEVERE ACUTE RESPIRATORY SYNDROME CORONAVIRUS 2 (SARS-COV-2) (CORONAVIRUS DISEASE [COVID-19]), AMPLIFIED PROBE TECHNIQUE, MAKING USE OF HIGH THROUGHPUT TECHNOLOGIES AS DESCRIBED BY CMS-2020-01-R: HCPCS | Performed by: PHYSICIAN ASSISTANT

## 2021-01-06 PROCEDURE — 99213 OFFICE O/P EST LOW 20 MIN: CPT | Performed by: PHYSICIAN ASSISTANT

## 2021-01-06 NOTE — PROGRESS NOTES
COVID-19 Virtual Visit     Assessment/Plan:    Problem List Items Addressed This Visit     None         Disposition:     I recommended the patient to come to our office to perform PCR testing for COVID-19  I have spent 10 minutes directly with the patient  Greater than 50% of this time was spent in counseling/coordination of care regarding: prognosis, instructions for management, importance of treatment compliance and impressions  Encounter provider Merry Kramer PA-C    Provider located at [facility] [address] [facility]  [address] [facility] 2  [address]  [phone]    Recent Visits  No visits were found meeting these conditions  Showing recent visits within past 7 days and meeting all other requirements     Today's Visits  Date Type Provider Dept   01/06/21 Telemedicine SRIRAM Cardenas Pg [facility] [address]   Showing today's visits and meeting all other requirements     Future Appointments  No visits were found meeting these conditions  Showing future appointments within next 150 days and meeting all other requirements      This virtual check-in was done via [employer] and patient was informed that this is a secure, HIPAA-compliant platform  She agrees to proceed  Patient agrees to participate in a virtual check in via telephone or video visit instead of presenting to the office to address urgent/immediate medical needs  Patient is aware this is a billable service  After connecting through [facility], the patient was identified by name and date of birth  Manuel Josue was informed that this was a telemedicine visit and that the exam was being conducted confidentially over secure lines  My office door was closed  No one else was in the room  Manuel Josue acknowledged consent and understanding of privacy and security of the telemedicine visit   I informed the patient that I have reviewed her record in [address] [place] [address] and presented the opportunity for her to ask any questions regarding the visit today  The patient agreed to participate  Subjective:   Jaison Guadarrama is a 63 y o  female who is concerned about COVID-19  Date of symptom onset: 1/4/2020    Patient's symptoms include chills, fatigue, malaise, nasal congestion, sore throat, cough, myalgias and headache  Patient denies fever, rhinorrhea, anosmia, loss of taste, shortness of breath, chest tightness, abdominal pain, nausea, vomiting and diarrhea         Exposure:   Contact with a person who is under investigation (PUI) for or who is positive for COVID-19 within the last 14 days?: No    Hospitalized recently for fever and/or lower respiratory symptoms?: No      Currently a healthcare worker that is involved in direct patient care?: No      Works in a special setting where the risk of COVID-19 transmission may be high? (this may include long-term care, correctional and snf facilities; homeless shelters; assisted-living facilities and group homes ): No      Resident in a special setting where the risk of COVID-19 transmission may be high? (this may include long-term care, correctional and snf facilities; homeless shelters; assisted-living facilities and group homes ): No      Lab Results   Component Value Date    SARSCOV2 Not Detected 09/28/2020     Past Medical History:   Diagnosis Date    Anxiety     Chronic kidney disease     renal sac    Depression     Disease of thyroid gland     Dysuria     LAST ASSESSED [id number]    Endometriosis     Gout     History of fractured kneecap     hairline fracture right knee due to accident      Past Surgical History:   Procedure Laterality Date    COLONOSCOPY      INGUINAL HERNIA REPAIR      LAST ASSESSED [id number]    OOPHORECTOMY  2004    thinks right side    OTHER SURGICAL HISTORY      FACE SURGERY  -- LAST ASSESSED [id number]    OTHER SURGICAL HISTORY      stiches in chin due to accident     [place] COLONOSCOPY FLX DX W/COLLJ SPEC WHEN PFRMD N/A 7/12/2018    Procedure: EGD AND COLONOSCOPY;  Surgeon: Prachi Ortiz MD;  Location: [facility]; Service: Gastroenterology     Current Outpatient Medications   Medication Sig Dispense Refill    ALPRAZolam (XANAX) 0 5 mg tablet Take 1 tablet (0 5 mg total) by mouth 2 (two) times a day 60 tablet 1    desvenlafaxine (PRISTIQ) 100 mg 24 hr tablet       levothyroxine 50 mcg tablet Take 1 tablet (50 mcg total) by mouth daily 90 tablet 0    MYDAYIS 50 MG CP24 Take 1 capsule by mouth daily  0    omeprazole (PriLOSEC) 40 MG capsule Take 1 capsule (40 mg total) by mouth daily before breakfast 30 capsule 0     No current facility-administered medications for this visit  No Known Allergies    Review of Systems   Constitutional: Positive for activity change, appetite change, chills and fatigue  Negative for fever  HENT: Positive for congestion, sinus pressure and sore throat  Negative for rhinorrhea  Respiratory: Positive for cough  Negative for chest tightness, shortness of breath and wheezing  Gastrointestinal: Negative for abdominal pain, diarrhea, nausea and vomiting  Musculoskeletal: Positive for myalgias  Neurological: Positive for headaches  Negative for dizziness and light-headedness  Objective:    Vitals:    01/06/21 1021   Pulse: (!) 54   Temp: (!) 97 1 °F (36 2 °C)   SpO2: 99%       Physical Exam  Constitutional:       Appearance: She is ill-appearing  HENT:      Head: Normocephalic and atraumatic  Right Ear: External ear normal       Left Ear: External ear normal    Eyes:      Conjunctiva/sclera: Conjunctivae normal    Neck:      Musculoskeletal: Normal range of motion  Pulmonary:      Effort: Pulmonary effort is normal    Neurological:      Mental Status: She is alert and oriented to person, place, and time     Psychiatric:         Mood and Affect: Mood normal          Behavior: Behavior normal        VIRTUAL VISIT DISCLAIMER    Heather Keaton acknowledges that she has consented to an online visit or consultation  She understands that the online visit is based solely on information provided by her, and that, in the absence of a face-to-face physical evaluation by the physician, the diagnosis she receives is both limited and provisional in terms of accuracy and completeness  This is not intended to replace a full medical face-to-face evaluation by the physician  Heather Pugh understands and accepts these terms

## 2021-01-08 ENCOUNTER — TELEMEDICINE (OUTPATIENT)
Dept: FAMILY MEDICINE CLINIC | Facility: CLINIC | Age: 66
End: 2021-01-08
Payer: COMMERCIAL

## 2021-01-08 VITALS — HEART RATE: 72 BPM | OXYGEN SATURATION: 98 %

## 2021-01-08 DIAGNOSIS — U07.1 COVID-19: Primary | ICD-10-CM

## 2021-01-08 LAB — SARS-COV-2 RNA SPEC QL NAA+PROBE: DETECTED

## 2021-01-08 PROCEDURE — 99213 OFFICE O/P EST LOW 20 MIN: CPT | Performed by: STUDENT IN AN ORGANIZED HEALTH CARE EDUCATION/TRAINING PROGRAM

## 2021-01-08 NOTE — RESULT ENCOUNTER NOTE
Please call Delilah Plascenciablanca and let her know that her COVID-19 swab was positive  Continue symptomatic treatment  Advise that she implement home isolation measures including      Staying home  Stay in a specific "sick room" or area and away from other people or animals, including pets  Wear a mask when leaving your room  Use a separate bathroom, if available  Wipe down all commonly touched surfaces with household   Please schedule follow up video visit today

## 2021-01-08 NOTE — PROGRESS NOTES
COVID-19 Virtual Visit     Assessment/Plan:    Problem List Items Addressed This Visit     None      Visit Diagnoses     COVID-19    -  Primary         Disposition:     I recommended continued isolation until at least 24 hours have passed since recovery defined as resolution of fever without the use of fever-reducing medications AND improvement in COVID symptoms AND 10 days have passed since onset of symptoms (or 10 days have passed since date of first positive viral diagnostic test for asymptomatic patients)  Does not meet criteria for BAM    I have spent 10 minutes directly with the patient  Greater than 50% of this time was spent in counseling/coordination of care regarding: risks and benefits of treatment options, instructions for management, patient and family education and impressions  Encounter provider Darell Victor MD    Provider located at [facility] [address]  [facility] [address]  [phone]    Recent Visits  Date Type Provider Dept   01/06/21 Telemedicine SRIRAM Kilgore Pg recent visits within past 7 days and meeting all other requirements     Today's Visits  Date Type Provider Dept   01/08/21 Telemedicine MD Ritchie Loyd [facility] [address] [place] [address]   Showing today's visits and meeting all other requirements     Future Appointments  No visits were found meeting these conditions  Showing future appointments within next 150 days and meeting all other requirements      This virtual check-in was done via [employer] and patient was informed that this is not a secure, HIPAA-compliant platform  She agrees to proceed  Patient agrees to participate in a virtual check in via telephone or video visit instead of presenting to the office to address urgent/immediate medical needs  Patient is aware this is a billable service      After connecting through [facility], the patient was identified by name and date of birth  Socorro Crum was informed that this was a telemedicine visit and that the exam was being conducted confidentially over secure lines  My office door was closed  Socorro Crum acknowledged consent and understanding of privacy and security of the telemedicine visit  I informed the patient that I have reviewed her record in [employer] and presented the opportunity for her to ask any questions regarding the visit today  The patient agreed to participate  Subjective:   Socorro Crum is a 63 y o  female who has been screened for COVID-19  Symptom change since last report: improving  Patient's symptoms include fever, chills, fatigue, malaise, nasal congestion, rhinorrhea, sore throat, cough, myalgias and headache  Patient denies shortness of breath, chest tightness, abdominal pain, vomiting and diarrhea  Stewart Carrel has been staying home and has isolated themselves in her home  She is taking care to not share personal items and is cleaning all surfaces that are touched often, like counters, tabletops, and doorknobs using household cleaning sprays or wipes  She is wearing a mask when she leaves her room       Date of symptom onset: 1/4/2021    Lab Results   Component Value Date    SARSCOV2 Detected (A) 01/06/2021     Past Medical History:   Diagnosis Date    Anxiety     Chronic kidney disease     renal sac    Depression     Disease of thyroid gland     Dysuria     LAST ASSESSED [id number]    Endometriosis     Gout     History of fractured kneecap     hairline fracture right knee due to accident      Past Surgical History:   Procedure Laterality Date    COLONOSCOPY      INGUINAL HERNIA REPAIR      LAST ASSESSED [id number]    OOPHORECTOMY  2004    thinks right side    OTHER SURGICAL HISTORY      FACE SURGERY  -- LAST ASSESSED [id number]    OTHER SURGICAL HISTORY      stiches in chin due to accident     [place] COLONOSCOPY FLX DX W/MEENAKSHIJ SPEC WHEN PFRMD N/A 7/12/2018    Procedure: EGD AND COLONOSCOPY;  Surgeon: Srinath Sharma MD;  Location: [facility]; Service: Gastroenterology     Current Outpatient Medications   Medication Sig Dispense Refill    ALPRAZolam (XANAX) 0 5 mg tablet Take 1 tablet (0 5 mg total) by mouth 2 (two) times a day 60 tablet 1    desvenlafaxine (PRISTIQ) 100 mg 24 hr tablet       levothyroxine 50 mcg tablet Take 1 tablet (50 mcg total) by mouth daily 90 tablet 0    MYDAYIS 50 MG CP24 Take 1 capsule by mouth daily  0    omeprazole (PriLOSEC) 40 MG capsule Take 1 capsule (40 mg total) by mouth daily before breakfast 30 capsule 0     No current facility-administered medications for this visit  No Known Allergies    Review of Systems   Constitutional: Positive for chills, fatigue and fever  HENT: Positive for congestion, rhinorrhea and sore throat  Respiratory: Positive for cough  Negative for chest tightness and shortness of breath  Gastrointestinal: Negative for abdominal pain, diarrhea and vomiting  Musculoskeletal: Positive for myalgias  Neurological: Positive for headaches  Objective:    Vitals:    01/08/21 1448   Pulse: 72   SpO2: 98%       Physical Exam  Constitutional:       General: She is not in acute distress  Appearance: Normal appearance  She is not ill-appearing or toxic-appearing  HENT:      Head: Normocephalic and atraumatic  Right Ear: External ear normal       Left Ear: External ear normal    Neck:      Musculoskeletal: Normal range of motion  Pulmonary:      Effort: Pulmonary effort is normal  No respiratory distress  Breath sounds: No wheezing  Neurological:      General: No focal deficit present  Mental Status: She is alert and oriented to person, place, and time  Mental status is at baseline  Psychiatric:         Mood and Affect: Mood normal          Behavior: Behavior normal          Thought Content:  Thought content normal        VIRTUAL VISIT DISCLAIMER    Tavo Hanson acknowledges that she has consented to an online visit or consultation  She understands that the online visit is based solely on information provided by her, and that, in the absence of a face-to-face physical evaluation by the physician, the diagnosis she receives is both limited and provisional in terms of accuracy and completeness  This is not intended to replace a full medical face-to-face evaluation by the physician  Cantonjen Hanson understands and accepts these terms

## 2021-01-11 ENCOUNTER — TELEPHONE (OUTPATIENT)
Dept: FAMILY MEDICINE CLINIC | Facility: CLINIC | Age: 66
End: 2021-01-11

## 2021-01-23 ENCOUNTER — TELEMEDICINE (OUTPATIENT)
Dept: FAMILY MEDICINE CLINIC | Facility: CLINIC | Age: 66
End: 2021-01-23
Payer: COMMERCIAL

## 2021-01-23 VITALS — OXYGEN SATURATION: 96 % | TEMPERATURE: 97.5 F

## 2021-01-23 DIAGNOSIS — R09.89 CHEST CONGESTION: ICD-10-CM

## 2021-01-23 DIAGNOSIS — U07.1 COVID-19: Primary | ICD-10-CM

## 2021-01-23 DIAGNOSIS — E03.9 HYPOTHYROIDISM, UNSPECIFIED TYPE: ICD-10-CM

## 2021-01-23 PROCEDURE — 99213 OFFICE O/P EST LOW 20 MIN: CPT | Performed by: STUDENT IN AN ORGANIZED HEALTH CARE EDUCATION/TRAINING PROGRAM

## 2021-01-23 PROCEDURE — 3725F SCREEN DEPRESSION PERFORMED: CPT | Performed by: STUDENT IN AN ORGANIZED HEALTH CARE EDUCATION/TRAINING PROGRAM

## 2021-01-23 PROCEDURE — 1036F TOBACCO NON-USER: CPT | Performed by: STUDENT IN AN ORGANIZED HEALTH CARE EDUCATION/TRAINING PROGRAM

## 2021-01-23 RX ORDER — AZITHROMYCIN 250 MG/1
TABLET, FILM COATED ORAL
Qty: 6 TABLET | Refills: 0 | Status: SHIPPED | OUTPATIENT
Start: 2021-01-23 | End: 2021-01-28

## 2021-01-23 RX ORDER — METHYLPREDNISOLONE 4 MG/1
TABLET ORAL
Qty: 21 EACH | Refills: 0 | Status: SHIPPED | OUTPATIENT
Start: 2021-01-23 | End: 2021-11-19 | Stop reason: ALTCHOICE

## 2021-01-23 RX ORDER — LEVOTHYROXINE SODIUM 0.05 MG/1
50 TABLET ORAL DAILY
Qty: 90 TABLET | Refills: 1 | Status: SHIPPED | OUTPATIENT
Start: 2021-01-23 | End: 2021-08-25 | Stop reason: SDUPTHER

## 2021-01-23 NOTE — PROGRESS NOTES
COVID-19 Virtual Visit     Assessment/Plan:    Problem List Items Addressed This Visit        Endocrine    Hypothyroidism    Relevant Medications    levothyroxine 50 mcg tablet    methylPREDNISolone 4 MG tablet therapy pack      Other Visit Diagnoses     COVID-19    -  Primary    Chest congestion        Relevant Medications    methylPREDNISolone 4 MG tablet therapy pack    azithromycin (Zithromax) 250 mg tablet         Disposition:     I recommended continued isolation until at least 24 hours have passed since recovery defined as resolution of fever without the use of fever-reducing medications AND improvement in COVID symptoms AND 10 days have passed since onset of symptoms (or 10 days have passed since date of first positive viral diagnostic test for asymptomatic patients)  Minimal improvement now with chest congestion and worsening uri symptoms  Will send in rx and have her call monday    I have spent 10 minutes directly with the patient  Encounter provider Mami Valladares MD    Provider located at [address]  [phone]    Recent Visits  No visits were found meeting these conditions  Showing recent visits within past 7 days and meeting all other requirements     Today's Visits  Date Type Provider Dept   01/23/21 Telemedicine Mami Valladares MD [facility] today's visits and meeting all other requirements     Future Appointments  No visits were found meeting these conditions  Showing future appointments within next 150 days and meeting all other requirements      This virtual check-in was done via [employer] and patient was informed that this is not a secure, HIPAA-compliant platform  She agrees to proceed      Patient agrees to participate in a virtual check in via telephone or video visit instead of presenting to the office to address urgent/immediate medical needs  Patient is aware this is a billable service  After connecting through [facility], the patient was identified by name and date of birth  Socorro Crum was informed that this was a telemedicine visit and that the exam was being conducted confidentially over secure lines  My office door was closed  No one else was in the room  Socorro Crum acknowledged consent and understanding of privacy and security of the telemedicine visit  I informed the patient that I have reviewed her record in [employer] and presented the opportunity for her to ask any questions regarding the visit today  The patient agreed to participate  Subjective:   Socorro Crum is a 63 y o  female who has been screened for COVID-19  Symptom change since last report: unchanged  Patient's symptoms include fatigue, malaise, nasal congestion, rhinorrhea, cough and chest tightness  Patient denies fever, chills, sore throat, anosmia, loss of taste, shortness of breath, abdominal pain, nausea, vomiting, diarrhea, myalgias and headaches       Date of symptom onset: 1/5/2021  Date of positive COVID-19 PCR: 1/7/2021    Lab Results   Component Value Date    SARSCOV2 Detected (A) 01/06/2021     Past Medical History:   Diagnosis Date    Anxiety     Chronic kidney disease     renal sac    Depression     Disease of thyroid gland     Dysuria     LAST ASSESSED [id number]    Endometriosis     Gout     History of fractured kneecap     hairline fracture right knee due to accident      Past Surgical History:   Procedure Laterality Date    COLONOSCOPY      INGUINAL HERNIA REPAIR      LAST ASSESSED [id number]    OOPHORECTOMY  2004    thinks right side    OTHER SURGICAL HISTORY      FACE SURGERY  -- LAST ASSESSED [id number]    OTHER SURGICAL HISTORY      stiches in chin due to accident     [place] COLONOSCOPY FLX DX W/COLLJ SPEC WHEN PFRMD N/A 7/12/2018    Procedure: EGD AND COLONOSCOPY;  Surgeon: Helena Chaudhry MD;  Location: [facility]; Service: Gastroenterology     Current Outpatient Medications   Medication Sig Dispense Refill    ALPRAZolam (XANAX) 0 5 mg tablet Take 1 tablet (0 5 mg total) by mouth 2 (two) times a day 60 tablet 1    desvenlafaxine (PRISTIQ) 100 mg 24 hr tablet Take 100 mg by mouth daily       levothyroxine 50 mcg tablet Take 1 tablet (50 mcg total) by mouth daily 90 tablet 1    MYDAYIS 50 MG CP24 Take 1 capsule by mouth daily  0    omeprazole (PriLOSEC) 40 MG capsule Take 1 capsule (40 mg total) by mouth daily before breakfast 30 capsule 0    azithromycin (Zithromax) 250 mg tablet Take 2 tablets (500 mg total) by mouth daily for 1 day, THEN 1 tablet (250 mg total) daily for 4 days  6 tablet 0    methylPREDNISolone 4 MG tablet therapy pack Use as directed on package 21 each 0     No current facility-administered medications for this visit  No Known Allergies    Review of Systems   Constitutional: Positive for fatigue  Negative for chills and fever  HENT: Positive for congestion and rhinorrhea  Negative for sore throat  Respiratory: Positive for cough and chest tightness  Negative for shortness of breath  Gastrointestinal: Negative for abdominal pain, diarrhea, nausea and vomiting  Musculoskeletal: Negative for myalgias  Neurological: Negative for headaches  Objective:    Vitals:    01/23/21 0829   Temp: 97 5 °F (36 4 °C)   SpO2: 96%       Physical Exam  Constitutional:       Appearance: Normal appearance  She is not ill-appearing, toxic-appearing or diaphoretic  HENT:      Head: Normocephalic and atraumatic  Right Ear: External ear normal       Left Ear: External ear normal       Nose: Congestion and rhinorrhea present  Neck:      Musculoskeletal: Normal range of motion  Pulmonary:      Effort: Pulmonary effort is normal  No respiratory distress  Breath sounds: No wheezing  Neurological:      General: No focal deficit present        Mental Status: She is alert and oriented to person, place, and time  Mental status is at baseline  Psychiatric:         Mood and Affect: Mood normal        VIRTUAL VISIT DISCLAIMER    Arianne Mccartney acknowledges that she has consented to an online visit or consultation  She understands that the online visit is based solely on information provided by her, and that, in the absence of a face-to-face physical evaluation by the physician, the diagnosis she receives is both limited and provisional in terms of accuracy and completeness  This is not intended to replace a full medical face-to-face evaluation by the physician  Arianne Mccartney understands and accepts these terms

## 2021-01-27 ENCOUNTER — TELEPHONE (OUTPATIENT)
Dept: FAMILY MEDICINE CLINIC | Facility: CLINIC | Age: 66
End: 2021-01-27

## 2021-01-27 NOTE — TELEPHONE ENCOUNTER
Spoke with patient and advised letter written  Patient requests letter be emailed  Verified email address and sent

## 2021-01-27 NOTE — LETTER
January 27, 2021     Patient: Jaiden Echavarria   YOB: 1955   Date of Visit: 1/27/2021       To Whom it May Concern:    Jaiden Echavarria is under my professional care  She was seen in my office on 1/27/2021  She may return to work on 1/28/2021  She should return to her previously scheduled duties       If you have any questions or concerns, please don't hesitate to call           Sincerely,          Jan Lloyd MD      CC: No Recipients

## 2021-01-27 NOTE — TELEPHONE ENCOUNTER
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

## 2021-02-02 ENCOUNTER — TELEMEDICINE (OUTPATIENT)
Dept: FAMILY MEDICINE CLINIC | Facility: CLINIC | Age: 66
End: 2021-02-02
Payer: COMMERCIAL

## 2021-02-02 DIAGNOSIS — J02.9 ACUTE PHARYNGITIS, UNSPECIFIED ETIOLOGY: Primary | ICD-10-CM

## 2021-02-02 PROCEDURE — 99213 OFFICE O/P EST LOW 20 MIN: CPT | Performed by: PHYSICIAN ASSISTANT

## 2021-02-02 NOTE — PROGRESS NOTES
Assessment/Plan:            Diagnoses and all orders for this visit:    Acute pharyngitis, unspecified etiology        No problem-specific Assessment & Plan notes found for this encounter  continue symptomatic treatment    sore throat, fatigue, PND, no fever  spO2 98%, P 62    Subjective:      Patient ID: Malcolm Manley is a 61 y o  female  Sore Throat   This is a new problem  The current episode started yesterday  The problem has been unchanged  There has been no fever  The pain is mild  Pertinent negatives include no abdominal pain, coughing, ear pain, headaches, hoarse voice, plugged ear sensation, shortness of breath or trouble swallowing  Associated symptoms comments: Postnasal drainage  She has tried cool liquids and gargles for the symptoms  The following portions of the patient's history were reviewed and updated as appropriate:   She has a past medical history of Anxiety, Chronic kidney disease, Depression, Disease of thyroid gland, Dysuria, Endometriosis, Gout, and History of fractured kneecap ,  does not have any pertinent problems on file  ,   has a past surgical history that includes Other surgical history; Inguinal hernia repair; Other surgical history; pr colonoscopy flx dx w/collj spec when pfrmd (N/A, 7/12/2018); Oophorectomy (2004); and Colonoscopy  ,  family history includes Alcohol abuse in her father; Atrial fibrillation in her mother; Dementia in her mother; Depression in her mother; Heart attack in her father, maternal grandfather, and paternal grandfather; No Known Problems in her daughter, daughter, maternal aunt, maternal aunt, paternal aunt, and sister; Other in her maternal grandmother; Stroke in her paternal grandmother  ,   reports that she has never smoked  She has never used smokeless tobacco  She reports that she does not drink alcohol or use drugs  ,  has No Known Allergies     Current Outpatient Medications   Medication Sig Dispense Refill    ALPRAZolam (XANAX) 0 5 mg tablet Take 1 tablet (0 5 mg total) by mouth 2 (two) times a day 60 tablet 1    desvenlafaxine (PRISTIQ) 100 mg 24 hr tablet Take 100 mg by mouth daily       levothyroxine 50 mcg tablet Take 1 tablet (50 mcg total) by mouth daily 90 tablet 1    methylPREDNISolone 4 MG tablet therapy pack Use as directed on package 21 each 0    MYDAYIS 50 MG CP24 Take 1 capsule by mouth daily  0    omeprazole (PriLOSEC) 40 MG capsule Take 1 capsule (40 mg total) by mouth daily before breakfast 30 capsule 0     No current facility-administered medications for this visit  Review of Systems   HENT: Positive for sore throat  Negative for ear pain, hoarse voice and trouble swallowing  Respiratory: Negative for cough and shortness of breath  Gastrointestinal: Negative for abdominal pain  Neurological: Negative for headaches  Objective:  Vitals:    02/03/21 1238   Pulse: 62   SpO2: 98%     There is no height or weight on file to calculate BMI  Physical Exam  Constitutional:       Appearance: She is ill-appearing  HENT:      Head: Normocephalic and atraumatic  Right Ear: External ear normal       Left Ear: External ear normal    Eyes:      Conjunctiva/sclera: Conjunctivae normal    Neck:      Musculoskeletal: Normal range of motion  Cardiovascular:      Rate and Rhythm: Normal rate  Pulmonary:      Effort: Pulmonary effort is normal    Neurological:      Mental Status: She is alert and oriented to person, place, and time     Psychiatric:         Mood and Affect: Mood normal          Behavior: Behavior normal

## 2021-02-03 VITALS — OXYGEN SATURATION: 98 % | HEART RATE: 62 BPM

## 2021-03-10 DIAGNOSIS — Z23 ENCOUNTER FOR IMMUNIZATION: ICD-10-CM

## 2021-03-17 ENCOUNTER — IMMUNIZATIONS (OUTPATIENT)
Dept: FAMILY MEDICINE CLINIC | Facility: HOSPITAL | Age: 66
End: 2021-03-17

## 2021-03-17 DIAGNOSIS — Z23 ENCOUNTER FOR IMMUNIZATION: Primary | ICD-10-CM

## 2021-03-17 PROCEDURE — 91300 SARS-COV-2 / COVID-19 MRNA VACCINE (PFIZER-BIONTECH) 30 MCG: CPT

## 2021-03-17 PROCEDURE — 0001A SARS-COV-2 / COVID-19 MRNA VACCINE (PFIZER-BIONTECH) 30 MCG: CPT
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
## 2021-03-22 ENCOUNTER — OFFICE VISIT (OUTPATIENT)
Dept: FAMILY MEDICINE CLINIC | Facility: CLINIC | Age: 66
End: 2021-03-22
Payer: COMMERCIAL

## 2021-03-22 VITALS
WEIGHT: 211.8 LBS | OXYGEN SATURATION: 98 % | BODY MASS INDEX: 34.04 KG/M2 | DIASTOLIC BLOOD PRESSURE: 82 MMHG | HEIGHT: 66 IN | SYSTOLIC BLOOD PRESSURE: 128 MMHG | HEART RATE: 103 BPM

## 2021-03-22 DIAGNOSIS — R53.83 OTHER FATIGUE: ICD-10-CM

## 2021-03-22 DIAGNOSIS — E03.9 HYPOTHYROIDISM, UNSPECIFIED TYPE: ICD-10-CM

## 2021-03-22 DIAGNOSIS — G93.3 POSTVIRAL FATIGUE SYNDROME: ICD-10-CM

## 2021-03-22 DIAGNOSIS — G93.3 POST VIRAL SYNDROME: ICD-10-CM

## 2021-03-22 DIAGNOSIS — K21.9 GASTROESOPHAGEAL REFLUX DISEASE: ICD-10-CM

## 2021-03-22 DIAGNOSIS — Z12.31 ENCOUNTER FOR SCREENING MAMMOGRAM FOR BREAST CANCER: ICD-10-CM

## 2021-03-22 DIAGNOSIS — K21.9 GASTROESOPHAGEAL REFLUX DISEASE, UNSPECIFIED WHETHER ESOPHAGITIS PRESENT: ICD-10-CM

## 2021-03-22 DIAGNOSIS — Z00.00 ANNUAL PHYSICAL EXAM: Primary | ICD-10-CM

## 2021-03-22 DIAGNOSIS — E78.2 HYPERLIPIDEMIA, MIXED: ICD-10-CM

## 2021-03-22 PROCEDURE — 1036F TOBACCO NON-USER: CPT | Performed by: STUDENT IN AN ORGANIZED HEALTH CARE EDUCATION/TRAINING PROGRAM

## 2021-03-22 PROCEDURE — 99396 PREV VISIT EST AGE 40-64: CPT | Performed by: STUDENT IN AN ORGANIZED HEALTH CARE EDUCATION/TRAINING PROGRAM

## 2021-03-22 PROCEDURE — 3008F BODY MASS INDEX DOCD: CPT | Performed by: STUDENT IN AN ORGANIZED HEALTH CARE EDUCATION/TRAINING PROGRAM

## 2021-03-22 RX ORDER — OMEPRAZOLE 40 MG/1
40 CAPSULE, DELAYED RELEASE ORAL
Qty: 30 CAPSULE | Refills: 0 | Status: SHIPPED | OUTPATIENT
Start: 2021-03-22 | End: 2021-07-27 | Stop reason: SDUPTHER

## 2021-03-22 RX ORDER — CLONIDINE HYDROCHLORIDE 0.1 MG/1
0.1 TABLET ORAL 2 TIMES DAILY
COMMUNITY
Start: 2021-02-11

## 2021-03-23 NOTE — PROGRESS NOTES
[address]     NAME: Leslye Tata  AGE: 61 y o  SEX: female  : 1955     DATE: 3/22/2021     Assessment and Plan:     Problem List Items Addressed This Visit        Digestive    GERD (gastroesophageal reflux disease)     Has been on PPI for some time, will have her see GI given age and duration of symptoms         Relevant Orders    Ambulatory referral to Gastroenterology       Endocrine    Hypothyroidism     Follow up TSH, continue synthroid         Relevant Orders    TSH, 3rd generation with Free T4 reflex       Other    Hyperlipidemia, mixed    Other fatigue     Likely post covid related, discussed increasing activity            Other Visit Diagnoses     Annual physical exam    -  Primary    Relevant Orders    Comprehensive metabolic panel    CBC and differential    Encounter for screening mammogram for breast cancer        Relevant Orders    Mammo screening bilateral w cad    Post viral syndrome        Postviral fatigue syndrome            Normal 6 minutes walk in regards oxygen saturation  Discussed that this may very well be post COVID related fatigue and continue increase physical activity and discussed signs symptoms that would warrant an urgent evaluation  Will follow-up labs as well  Immunizations and preventive care screenings were discussed with patient today  Appropriate education was printed on patient's after visit summary  Counseling:  Alcohol/drug use: discussed moderation in alcohol intake, the recommendations for healthy alcohol use, and avoidance of illicit drug use  Dental Health: discussed importance of regular tooth brushing, flossing, and dental visits  Injury prevention: discussed safety/seat belts, safety helmets, smoke detectors, carbon dioxide detectors, and smoking near bedding or upholstery    Sexual health: discussed sexually transmitted diseases, partner selection, use of condoms, avoidance of unintended pregnancy, and contraceptive alternatives  · Exercise: the importance of regular exercise/physical activity was discussed  Recommend exercise 3-5 times per week for at least 30 minutes  BMI Counseling: Body mass index is 34 19 kg/m²  The BMI is above normal  Nutrition recommendations include decreasing portion sizes, encouraging healthy choices of fruits and vegetables, decreasing fast food intake, consuming healthier snacks, limiting drinks that contain sugar and moderation in carbohydrate intake  Exercise recommendations include moderate physical activity 150 minutes/week, exercising 3-5 times per week and strength training exercises  No pharmacotherapy was ordered  No follow-ups on file  Chief Complaint:     Chief Complaint   Patient presents with    Fatigue     since [facility]     Annual Exam    Heartburn     states she thinks its getting worse       History of Present Illness:     Adult Annual Physical   Patient here for a comprehensive physical exam  The patient reports problems - Patient is coming today to discuss continued fatigue  Diagnosed with COVID in the 1st week of January and has been having worsening fatigue with continued mental fog as well  Is not sure well she can do at this point  Denies any chest pain or shortness of breath       Diet and Physical Activity  · Diet/Nutrition: well balanced diet  · Exercise: no formal exercise  Depression Screening  PHQ-9 Depression Screening    PHQ-9:   Frequency of the following problems over the past two weeks:           General Health  · Sleep: gets more than 8 hours of sleep on average  · Hearing: normal - bilateral   · Vision: no vision problems  · Dental: regular dental visits  /GYN Health  · Patient is: postmenopausal     Review of Systems:     Review of Systems   Constitutional: Positive for fatigue  Negative for chills and fever     HENT: Negative for rhinorrhea and sore throat  Eyes: Negative for visual disturbance  Respiratory: Negative for cough and shortness of breath  Cardiovascular: Negative for chest pain and palpitations  Gastrointestinal: Negative for abdominal pain, constipation, diarrhea, nausea and vomiting  Genitourinary: Negative for difficulty urinating, dysuria and frequency  Musculoskeletal: Negative for arthralgias and myalgias  Skin: Negative for color change and rash  Neurological: Negative for weakness and headaches  Past Medical History:     Past Medical History:   Diagnosis Date    Anxiety     Chronic kidney disease     renal sac    Depression     Disease of thyroid gland     Dysuria     LAST ASSESSED [id number]    Endometriosis     Gout     History of fractured kneecap     hairline fracture right knee due to accident       Past Surgical History:     Past Surgical History:   Procedure Laterality Date    COLONOSCOPY      INGUINAL HERNIA REPAIR      LAST ASSESSED [id number]    OOPHORECTOMY  2004    thinks right side    OTHER SURGICAL HISTORY      FACE SURGERY  -- LAST ASSESSED [id number]    OTHER SURGICAL HISTORY      stiches in chin due to accident     NV COLONOSCOPY FLX DX W/COLLJ SPEC WHEN PFRMD N/A 7/12/2018    Procedure: EGD AND COLONOSCOPY;  Surgeon: Sandrita Shah MD;  Location: [facility];   Service: Gastroenterology      Social History:        Social History     Socioeconomic History    Marital status: /Civil Union     Spouse name: Not on file    Number of children: Not on file    Years of education: Not on file    Highest education level: Not on file   Occupational History    Not on file   Social Needs    Financial resource strain: Not on file    Food insecurity     Worry: Not on file     Inability: Not on file    Transportation needs     Medical: Not on file     Non-medical: Not on file   Tobacco Use    Smoking status: Never Smoker    Smokeless tobacco: Never Used   Substance and Sexual Activity    Alcohol use: No    Drug use: No    Sexual activity: Not on file   Lifestyle    Physical activity     Days per week: Not on file     Minutes per session: Not on file    Stress: Not on file   Relationships    Social connections     Talks on phone: Not on file     Gets together: Not on file     Attends Orthodoxy service: Not on file     Active member of club or organization: Not on file     Attends meetings of clubs or organizations: Not on file     Relationship status: Not on file    Intimate partner violence     Fear of current or ex partner: Not on file     Emotionally abused: Not on file     Physically abused: Not on file     Forced sexual activity: Not on file   Other Topics Concern    Not on file   Social History Narrative    ALWAYS USES [employer]    DAILY [employer]    EMPLOYED     SEEING DENTIST       Family History:     Family History   Problem Relation Age of Onset    Atrial fibrillation Mother     Depression Mother     Dementia Mother     Alcohol abuse Father     Heart attack Father     Other Maternal Grandmother         BLOOD DISORDER    Heart attack Maternal Grandfather     Stroke Paternal Grandmother     Heart attack Paternal Grandfather     No Known Problems Sister     No Known Problems Daughter     No Known Problems Daughter     No Known Problems Maternal Aunt     No Known Problems Maternal Aunt     No Known Problems Paternal Aunt     Breast cancer Neg Hx       Current Medications:     Current Outpatient Medications   Medication Sig Dispense Refill    ALPRAZolam (XANAX) 0 5 mg tablet Take 1 tablet (0 5 mg total) by mouth 2 (two) times a day 60 tablet 1    cloNIDine (CATAPRES) 0 1 mg tablet Take 0 1 mg by mouth 2 (two) times a day      desvenlafaxine (PRISTIQ) 100 mg 24 hr tablet Take 100 mg by mouth daily       levothyroxine 50 mcg tablet Take 1 tablet (50 mcg total) by mouth daily 90 tablet 1    methylPREDNISolone 4 MG tablet therapy pack Use as directed on package 21 each 0    MYDAYIS 50 MG CP24 Take 1 capsule by mouth daily  0    omeprazole (PriLOSEC) 40 MG capsule Take 1 capsule (40 mg total) by mouth daily before breakfast 30 capsule 0     No current facility-administered medications for this visit  Allergies:     No Known Allergies   Physical Exam:     /82   Pulse 103   Ht 5' 6" (1 676 m)   Wt 96 1 kg (211 lb 12 8 oz)   SpO2 98%   BMI 34 19 kg/m²     Physical Exam  Constitutional:       General: She is not in acute distress  Appearance: Normal appearance  She is not ill-appearing  HENT:      Head: Normocephalic and atraumatic  Right Ear: Tympanic membrane, ear canal and external ear normal       Left Ear: Tympanic membrane, ear canal and external ear normal       Nose: Nose normal       Mouth/Throat:      Mouth: Mucous membranes are moist       Pharynx: Oropharynx is clear  No oropharyngeal exudate or posterior oropharyngeal erythema  Eyes:      General: No scleral icterus  Right eye: No discharge  Left eye: No discharge  Extraocular Movements: Extraocular movements intact  Conjunctiva/sclera: Conjunctivae normal       Pupils: Pupils are equal, round, and reactive to light  Neck:      Musculoskeletal: Normal range of motion and neck supple  Cardiovascular:      Rate and Rhythm: Normal rate and regular rhythm  Pulses: Normal pulses  Heart sounds: Normal heart sounds  No murmur  Pulmonary:      Effort: Pulmonary effort is normal  No respiratory distress  Breath sounds: Normal breath sounds  Abdominal:      General: Bowel sounds are normal       Palpations: Abdomen is soft  Tenderness: There is no abdominal tenderness  Musculoskeletal: Normal range of motion  Lymphadenopathy:      Cervical: No cervical adenopathy  Skin:     General: Skin is warm and dry  Capillary Refill: Capillary refill takes less than 2 seconds     Neurological:      General: No focal deficit present  Mental Status: She is alert and oriented to person, place, and time  Mental status is at baseline  Cranial Nerves: No cranial nerve deficit  Psychiatric:         Mood and Affect: Mood normal           Mami Valladares MD  [address]  BMI Counseling: Body mass index is 34 19 kg/m²  The BMI is above normal  Nutrition recommendations include reducing portion sizes, 3-5 servings of fruits/vegetables daily, consuming healthier snacks, moderation in carbohydrate intake and reducing intake of cholesterol  Exercise recommendations include moderate aerobic physical activity for 150 minutes/week, exercising 3-5 times per week and strength training exercises

## 2021-03-23 NOTE — PATIENT INSTRUCTIONS

## 2021-04-08 ENCOUNTER — IMMUNIZATIONS (OUTPATIENT)
Dept: FAMILY MEDICINE CLINIC | Facility: HOSPITAL | Age: 66
End: 2021-04-08

## 2021-04-08 DIAGNOSIS — Z23 ENCOUNTER FOR IMMUNIZATION: Primary | ICD-10-CM

## 2021-04-08 PROCEDURE — 0002A SARS-COV-2 / COVID-19 MRNA VACCINE (PFIZER-BIONTECH) 30 MCG: CPT

## 2021-04-08 PROCEDURE — 91300 SARS-COV-2 / COVID-19 MRNA VACCINE (PFIZER-BIONTECH) 30 MCG: CPT

## 2021-05-16 ENCOUNTER — PATIENT MESSAGE (OUTPATIENT)
Dept: FAMILY MEDICINE CLINIC | Facility: CLINIC | Age: 66
End: 2021-05-16

## 2021-05-17 ENCOUNTER — TELEPHONE (OUTPATIENT)
Dept: FAMILY MEDICINE CLINIC | Facility: CLINIC | Age: 66
End: 2021-05-17

## 2021-05-17 DIAGNOSIS — G93.3 POSTVIRAL FATIGUE SYNDROME: ICD-10-CM

## 2021-05-17 DIAGNOSIS — Z86.16 PERSONAL HISTORY OF COVID-19: ICD-10-CM

## 2021-05-17 NOTE — TELEPHONE ENCOUNTER
----- Message from [facility] sent at 5/17/2021  8:06 AM EDT -----  Regarding: FW: Visit Follow-Up Question  Contact: [phone]    ----- Message -----  From: Rebekah Luis  Sent: 5/16/2021  11:32 PM EDT  To: , #  Subject: Visit Jewell Rider    During my last visit, we discussed my long-term  Covid symptoms  I continue to experience exhaustion about ever 10-14 days, after trying to engage in my job as a  and moderate home activity  I have been coming home from work, having dinner and often going to sleep as early as 6:30 pm, getting up at 4:00 am for work  As I said, after 10-14 days of eating well, working, sleeping more than normal I find myself having to take a day off, sleeping up to 18 hours of that day  I have noticed that the day before this complete inability to get out of bed, I feel "off, as if my body seems to starts to feel drained  I know you mentioned enrolling me in the long-term covid clinic at [facility]  Are there any meetings, sessions or appointments I can make to get additional guidance? 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

## 2021-05-17 NOTE — TELEPHONE ENCOUNTER
Patient Name: Carolyn Stockton     : 1955     MRN: [id number]    Assessment/Plan:    Problem List Items Addressed This Visit     None      Visit Diagnoses     Personal history of COVID-19        Relevant Orders    Ambulatory referral to Physical Therapy    Ambulatory referral to Occupational Therapy    Ambulatory referral to Covid Group Therapy    Ambulatory referral to Speech Therapy    Postviral fatigue syndrome        Relevant Orders    Ambulatory referral to Physical Therapy    Ambulatory referral to Occupational Therapy    Ambulatory referral to Covid Group Therapy    Ambulatory referral to Speech Therapy        [unfilled]  @[facility]FORM@    {Covid Time Spent:50363}     Subjective:    [unfilled]  [unfilled]     Patient Active Problem List   Diagnosis    Anxiety    GERD (gastroesophageal reflux disease)    Hyperlipidemia, mixed    Pain of left hip joint    Lumbar radiculitis    Lumbar spondylosis    Other fatigue    Arthritis    Skin lesion    Hypothyroidism    Gallbladder colic     Social History     Tobacco Use    Smoking status: Never Smoker    Smokeless tobacco: Never Used   Substance Use Topics    Alcohol use: No    Drug use: No      Objective:  @VS@     [unfilled]    Fernando Das MD

## 2021-05-17 NOTE — TELEPHONE ENCOUNTER
From: Dylan He  To: Enriqueta Guzman MD  Sent: 5/16/2021 11:32 PM EDT  Subject: Visit Micky Ward    During my last visit, we discussed my long-term  Covid symptoms  I continue to experience exhaustion about ever 10-14 days, after trying to engage in my job as a  and moderate home activity  I have been coming home from work, having dinner and often going to sleep as early as 6:30 pm, getting up at 4:00 am for work  As I said, after 10-14 days of eating well, working, sleeping more than normal I find myself having to take a day off, sleeping up to 18 hours of that day  I have noticed that the day before this complete inability to get out of bed, I feel "off, as if my body seems to starts to feel drained  I know you mentioned enrolling me in the long-term covid clinic at [facility]  Are there any meetings, sessions or appointments I can make to get additional guidance? 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

## 2021-06-04 ENCOUNTER — TELEPHONE (OUTPATIENT)
Dept: FAMILY MEDICINE CLINIC | Facility: CLINIC | Age: 66
End: 2021-06-04

## 2021-06-04 NOTE — TELEPHONE ENCOUNTER
Pt called and spoke to Dr Jose Alfredo Vaughn regarding referral for PT, OT & Speech Therapy  I called [facility] in [place] and was told they are now scheduling initial eval for PT to determine need for OT & Speech  Explained this to patient and she will call [place] office [phone]  No further follow up needed

## 2021-06-07 ENCOUNTER — EVALUATION (OUTPATIENT)
Dept: PHYSICAL THERAPY | Facility: CLINIC | Age: 66
End: 2021-06-07
Payer: COMMERCIAL

## 2021-06-07 DIAGNOSIS — G93.3 POSTVIRAL FATIGUE SYNDROME: ICD-10-CM

## 2021-06-07 DIAGNOSIS — U09.9 CHRONIC POST-COVID-19 SYNDROME: Primary | ICD-10-CM

## 2021-06-07 DIAGNOSIS — Z86.16 PERSONAL HISTORY OF COVID-19: ICD-10-CM

## 2021-06-07 PROCEDURE — 97162 PT EVAL MOD COMPLEX 30 MIN: CPT

## 2021-06-07 PROCEDURE — 97110 THERAPEUTIC EXERCISES: CPT

## 2021-06-07 NOTE — PROGRESS NOTES
PT Evaluation     Today's date: 2021  Patient name: Devon Rodriguez  : 1955  MRN: [id number]  Referring provider: Emily Muñiz MD  Dx:   Encounter Diagnosis     ICD-10-CM    1  Chronic post-COVID-19 syndrome  B94 8    2  Personal history of COVID-19  Z86 16 Ambulatory referral to Physical Therapy   3  Postviral fatigue syndrome  G93 3 Ambulatory referral to Physical Therapy       Start Time: 0900  Stop Time: 1000  Total time in clinic (min): 60 minutes    Assessment  Assessment details: Mrs Devon Rodriguez is a 61year-old female reporting to [facility] OP PT for initial evaluation of c/o ongoing chronic fatigue and deconditioning S/P COVID-19 diagnosis in early 2021  Pt was referred for consultation by Dr Fanny Clemons MD  PMx significant for anxiety and hyperthryroidism  Upon examination testing, objective testing proved to be unremarkable for endurance, balance, mobility, and power, however all tasks were characterized by some subjective report of fatigue  We spent the rest of the session discussing the purpose and potential benefits of the [address] [place] [address] return to activity exercise calendar , reviewing modified-RPE intensities and strength-specific exercises  Due to her ongoing c/o coughing and difficulties with multitasking and concentration, Diane Crum would benefit from skilled ST evaluation  At this time, keeping in mind her $25 00 co-pay, Diane Crum was open to being seen for a follow-up visit in two weeks in accordance with her scheduled ST evaluation to determine needs for further skilled PT guidance  Impairments: abnormal movement, activity intolerance, impaired physical strength, lacks appropriate home exercise program, poor posture  and poor body mechanics    Symptom irritability: moderateBarriers to therapy: $25 00 co-pay  Understanding of Dx/Px/POC: good   Prognosis: good    Goals  ST weeks  1   Diane Crum will report compliance with [facility] post-COVID rehab calendar Nikkie Green will subjectively report minimally 25% improvements in sensations of fatigue  3  Lidya Riddles will be confident in being placed on a 30-day hold from skilled PT care to continue to progress her level of activity independently  Plan  Plan details: Need for continued skilled PT guidance to be re-assessed NV in accordance with scheduled ST evaluation  Patient would benefit from: skilled physical therapy and speech eval  Referral necessary: No  Planned modality interventions: biofeedback, cryotherapy and thermotherapy: hydrocollator packs  Planned therapy interventions: IADL retraining, joint mobilization, manual therapy, massage, motor coordination training, muscle pump exercises, neuromuscular re-education, postural training, patient education, self care, sensory integrative techniques, strengthening, stretching, therapeutic activities, therapeutic exercise, therapeutic training, transfer training, work reintegration, home exercise program, graded motor, graded exercise, graded activity, gait training, functional ROM exercises, flexibility, fine motor coordination training, coordination, community reintegration, cognitive skills, breathing training, body mechanics training, behavior modification, balance/weight bearing training, balance, ADL training, ADL retraining, activity modification and abdominal trunk stabilization  Frequency: 1x week  Duration in weeks: 4  Plan of Care beginning date: 6/7/2021  Plan of Care expiration date: 8/27/2021  Treatment plan discussed with: patient        Subjective Evaluation    History of Present Illness  Date of onset: 1/6/2021  Mechanism of injury: Mrs Gregorio Bailon is a 62 y/o female presenting to 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 but continues to feel that her sleep schedule is "off" - she is able to work normally for 10-11 days at a time and then takes the following day off to sleep as much as 18 hours  She additionally feels she coughs frequently and has had some difficulties focusing  She is currently seeing a psychiatrist and counseling for the stressors she has experienced post-COVID  Finally, she has experienced weight gain due to issues with her thyroidism and overall decline in activity levels  Since the pandemic she no longer participates in yoga and riding her bicycle as she usually does this time of year  Recurrent probem    Quality of life: fair    Pain  No pain reported    Social Support  Steps to enter house: yes  Stairs in house: yes   Lives in: multiple-level home  Lives with: spouse    Employment status: working (High school psychology and  )  Exercise history: Pre-COVID pandemic included yoga and bicycle riding  Treatments  Treatments tried: None    Current treatment: physical therapy  Patient Goals  Patient goals for therapy: increased motion, improved balance, increased strength, return to sport/leisure activities and return to work  Patient goal: "To not have this feeling of fatigue all the time "        Objective    Vitals: Seated, manual, L UE  BP: 122/78 mmHg  HR: 70 BPM  O2: 99%  RR: 16 BrPM      MMT LE: Globally 4+/5 at hips, 5/5 at knees and ankles BL    MCTSIB: 30s all positions  5xSTS: 9 38s no UE  6MWT: 1350 feet no AD  Gait Speed: 1 45 m/s no AD  FGA: 29/30         Precautions: Post-COVID 19, hyperthyroidism, anxiety      Manuals 6/7  IE                                                                Neuro Re-Ed Ther Ex             Pt Education Post-Covid Exercise programming for HEP x15 min                                                                                                       Ther Activity                                       Gait Training                                       Modalities

## 2021-06-24 ENCOUNTER — EVALUATION (OUTPATIENT)
Dept: SPEECH THERAPY | Facility: CLINIC | Age: 66
End: 2021-06-24
Payer: COMMERCIAL

## 2021-06-24 ENCOUNTER — OFFICE VISIT (OUTPATIENT)
Dept: PHYSICAL THERAPY | Facility: CLINIC | Age: 66
End: 2021-06-24
Payer: COMMERCIAL

## 2021-06-24 DIAGNOSIS — G93.3 POSTVIRAL FATIGUE SYNDROME: ICD-10-CM

## 2021-06-24 DIAGNOSIS — Z86.16 PERSONAL HISTORY OF COVID-19: Primary | ICD-10-CM

## 2021-06-24 DIAGNOSIS — U09.9 CHRONIC POST-COVID-19 SYNDROME: ICD-10-CM

## 2021-06-24 DIAGNOSIS — R49.9 UNSPECIFIED VOICE AND RESONANCE DISORDER: ICD-10-CM

## 2021-06-24 DIAGNOSIS — Z86.16 PERSONAL HISTORY OF COVID-19: ICD-10-CM

## 2021-06-24 DIAGNOSIS — R48.8 OTHER SYMBOLIC DYSFUNCTIONS: Primary | ICD-10-CM

## 2021-06-24 PROCEDURE — 92507 TX SP LANG VOICE COMM INDIV: CPT

## 2021-06-24 PROCEDURE — 97110 THERAPEUTIC EXERCISES: CPT

## 2021-06-24 PROCEDURE — 92523 SPEECH SOUND LANG COMPREHEN: CPT

## 2021-06-24 PROCEDURE — 92524 BEHAVRAL QUALIT ANALYS VOICE: CPT

## 2021-06-24 NOTE — PROGRESS NOTES
Speech-Language Pathology Initial Evaluation    Today's date: 2021  Patients name: Malcolm Chen  : 1955  MRN: [id number]  Safety measures: post COVID  Referring provider: Emilie Lazaro MD    Primary diagnosis/billing code: O99 6  Secondary diagnosis/billing code: R49 9    Visit tracking:  -Referring provider: Epic  -Billing guidelines: AMA  -Visit # (combined with PT and OT) -patient in PT  -Insurance: [employer]  -RE due 21    Subjective comments: "I'm tired of being tired"    How did the patient hear about us? Physician    Patient's goal(s): To improve fatigue; improve voice and word finding    Reason for referral: Change in cognitive status and Change in vocal quality  Prior functional status: Communication effective and appropriate in all situations  Clinically complex situations: N/A    History: Patient is a 61 y o  female who was referred to outpatient skilled Speech Therapy services for a cognitive-linguistic and voice evaluation  Per her report; she was diagnosed in early 2021 with primary symptoms were fever, extreme fatigue, and sore throat  She was not hospitalized and was not prescribed supplemental O2  She followed quarantine protocols and closely monitored her blood O2 levels at home while recovering with no clinically significant declines  She has since returned to  Her profession as a high school literature and  full-time but continues to feel that her sleep schedule is "off" - she is able to work normally for 10-11 days at a time and then takes the following day off to sleep as much as 18 hours  She additionally feels she coughs frequently and has had some difficulties focusing  She is currently seeing a psychiatrist and counseling for the stressors she has experienced post-COVID  Finally, she has experienced weight gain due to issues with her thyroidism and overall decline in activity levels   Since the pandemic she no longer participates in yoga and riding her bicycle as she usually does this time of year  Patient has ADHD  Vocal hoarseness  New onset: word finding difficulties; forgetting things (using alarms) but still "spacing out"  Tired constantly  Sleeping   "I'm tired of being tired"  School finished last week  Throat not sure; feeling mucusing in throat, cough (daily) - medium  Postponing dentist because of cough    Hearing: [facility] for testing  Vision: [facility] for testing    Home environment/lifestyle: home with , cats  Highest level of education: graduate degree in teaching  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)    Mental status: Alert  Behavior status: Cooperative  Communication modalities: Verbal  Rehabilitation prognosis: Good rehab potential to reach the established goals    Assessments    The Test of Adult Language - Fourth Edition (TOAL-4) is a standardized, norm-referenced assessment measuring important communicative abilities in spoken and written language  The test in normed on individuals 12:0-24:11  Due to these age restrictions, these standardized scores should not be compared to standard or percentile rank  Objective measures were taken to complete a diagnostic impression and prognosis for this pt  The TOAL-4 has 6 subtests; their results are reported as percentile ranks and scaled scores  The results of the TOAL-4 are generally used for three purposes; (a) to identify adolescents and adults who score significantly below their peers and therefore might need help improving their language proficiency, (b) to determine areas of relative strength and weakness among language abilities, and (c) to serve as a research tool in studies investigating language problems in adolescents and adults  Due to time constraints, only portions of the standardized assessment were administered on this date of service  Subtest: Raw Score: Percentile:  Scaled Score: Descriptive Ratin   Word Opposites 30/34 91%tile 14 Above Average   3  Spoken Analogies 22/26 84%tile 13 Above Average   4  Word Similarities 30/40 95%tile 15 Superior     **despite high scores; patient reports she felt "embarrased" she could not get some of the words on this test; since she is a "    VOICE EVALUATION:  -Chief complaint: coughing and throat clearing, vocal hoarness    -Onset: Sudden (beginning: Jan 2021 - covid dx)    -Occupational/vocal demands: Patient is a HS teacher, currently on break (just started)    -Throat clearing/coughing?: daily, consistently (mild-moderate noted on eval)     Voice Handicap Index (VHI): The VHI is a list of 30 statements that many people have used to describe their voices and the effects of their voices on their lives  Patient indicated how frequently she has the same experience using a rating point scale (never = 0, almost never = 1, sometimes = 2, almost always = 3, and always = 4)  Patient's results were as follows:    Subscale: Score: Self-Perceived Impairment Level:   Physical 16/40 Mild   Functional 8/40 Mild   Emotional 8/40 Mild        TOTAL 32/120 Mild        Maximum Phonation Time: Patient was instructed to sustain the sound /ah/ across three trials to measure respiratory and laryngeal coordination and efficiency  Adults are typically able to prolong these vowels sound for 15-20 seconds  Reduced MPT may suggest poor respiratory support, or poor medial glottal closure  Trial 1 (sec): Trial 2 (sec): Trial 3 (sec):   14 37 15 74 19 56     Average Duration: 16 55 seconds -WNL      MEASURES OF PITCH:  The Visi-Pitch IV, a computer-driven voice analysis program, was used to collect objective voice data using the Visi-Pitch Multidimensional Voice Program (MDVP) & Real Time Pitch Program (MTPP)  Multi-Dimensional Voice Profile (MDVP):  This is obtained on the phonation of the sound /a/, in which dimensions of the voice, including frequncy perturbations, amplitude perturbations, variations in F0, noise to harmonic ratio, voice turbulence Index, soft phonation Index, tremours in frequency and amplitude, degree of subharmonics, and degree of voicing apart from the frequency and amplitude, are reflected  Normative Data:   Mean Fundamental Frequency (F0) 248 7 Hz 243 9 Hz   Jitter (RAP) 0 28% 0 378%   Shimmer 2 2% 1 997%   Wmuww-xg-Kcbuuvjpg Ratio (NHR) 0 10 0 112     WNL    Habitual Pitch: Patient was instructed to engage in two different speaking tasks (counting and reading) to calculate the pitch she uses most frequently  Task: Mean F0 (Hz) Min-Max Range (Hz) Normative Data:   Speaking (counting 1-20) 201 59 176-273 225 Hz (180 Hz -250 Hz)   Reading ("Grandfather Passage) 208 47  225 Hz (180 Hz -250 Hz)     WNL    Maximal Phonational Frequency Range: Patient was instructed to voice from lowest to highest notes  Task Min-Max Range (Hz) Normative Data:   Gliding  134 Hz-895 Hz     WNL    Patient was educated on various vocal abuse behaviors and vocal hygiene throughout assessment  Vocal abuses included decreased water and increased caffeine intake, coughing and throat-clearing, thoracic/clavicular breathing, straining voice, whispering  Vocal hygiene strategies included increased water intake, decreased caffeine intake, throat-clearing awareness, increased breath support/diaphragmatic breathing, compensatory strategies to minimize vocal abuses during work day, confidential voice  Patient was agreeable  Goals    Short Term Goals:     VOICE/COUGH GOALS    Patient will be educated on vocal hygiene and demonstrate understanding of recommendations to facilitate increased quality of voice, to be achieved in 2-4 weeks  Patient will be educated on diaphragmatic breathing (versus clavicular breathing) to establish controlled, rhythmic breathing, to be achieved in 2-4 weeks       Patient will utilize semi-occluded vocal tract exercises without laryngeal tension to promote healthy vocal function with 80% accuracy, to be achieved in 4-6 weeks  Patient will decrease laryngeal and articulatory muscle tension by independently completing relaxation/stretching exercises, to be achieved in 6-8 weeks  Patient will be educated on techniques to decrease chronic cough symptoms and utilize appropriately in 2-4 weeks  Patient will eliminate phonotraumatic behaviors such as chronic throat clearing, by substituting non-traumatic methods to clear mucus  COG/LING GOALS    Patient will name an appropriate synonym/antonym for a given word with 80% accuracy to build expressive vocabulary for conversation, to be achieved in 4-6 weeks  Patient will complete word generation tasks (e g , analogies, category matrices, etc ) with 80% accuracy using word finding strategies to facilitate improved word retrieval skills, to be achieved in 4-6 weeks  Long Term Goals:     Patient will improve vocal quality for increased functional communication by discharge  Patient will independently utilize vocal function exercises to maintain best level of voice to facilitate increased generational skills into conversational speech by discharge  Patient will demonstrate adequate verbal expression during conversation without breakdowns or word finding deficits by discharge  Impressions/Recommendations    Impressions:   -Patient presents with mild cognitive-linguisitc deficits s/p COVID-19; characteristic of a functional cognitive disorder  Patient would benefit from education and cognitive remediation therapy to target her goals of word finding  Patient also presents with mild chronic cough/vocal hoarseness   Patient would benefit from education on vocal hygiene, strategies on decreasing her phonotraumatic behaviors such as chronic throat clearing, and possible MFR(myofascial release) for MTD (muscle tension dysphonia)        Recommendations:  -Patient would benefit from outpatient skilled Speech Therapy services: Voice therapy and Cognitive-Linguistic therapy  Due to patient's current symptoms he/she may benefit from Myofascial Release treatment (MFR) for voice and swallowing  This is a manual therapy technique through myofascial release (stimulation/pressure/stretch) to address patient's symptoms, and will be completed unless otherwise contraindicated         -Frequency: 1x weekly  -Duration: 4-6 weeks    -Intervention certification from: 3/36/5084  -Intervention certification to: 1/25/9976    -Intervention comments:   Cog testing x 30 min; voice testing x 20 min; reviewing POC and treatment goals/techniques to help return patient to PLOF x 10 min

## 2021-06-24 NOTE — PROGRESS NOTES
Daily Note     Today's date: 2021  Patient name: Shaun Zepeda  : 1955  MRN: [id number]  Referring provider: Kee Armijo MD  Dx:   Encounter Diagnosis     ICD-10-CM    1  Personal history of COVID-19  Z86 16    2  Chronic post-COVID-19 syndrome  B94 8    3  Postviral fatigue syndrome  G93 3        Start Time: 1800  Stop Time: 1845  Total time in clinic (min): 45 minutes    Subjective: Since her evaluation several weeks ago, Trisha Bassett feels she has not made sticking progress with her post-COVID exercise calendar and continues to have chronic sensations of fatigue despite her progress with the calendar progressions  She does admit that when she participates in exercise programming she feels greater sensations of energy and wants to continue with skilled PT care 1x/week for further guidance  Objective: See treatment diary below    Assessment: Based on our discussions today, Trisha Bassett will trial her COVID calendar programming twice daily starting with her aerobic days to pronounce greater feelings of energy throughout the day  In addition to this, based on her current sleep habits of going to bed at 8 PM and waking up at 3 AM but then waking up and falling back asleep several more times until 8 AM I emphasized basic sleep hygiene habits and recommended once she is awake to try to stay awake  Additionally I suggested she may benefit from consulting her PCP for a sleep study referral to assess the quality of her sleep  Trisha Bassett will continue with skilled PT care to offer guidance in maximizing her activity levels and subjective feelings of energy  Goals  ST weeks  1  Trisha Bassett will report compliance with [facility] post-COVID rehab calendar HEP  - MET  2  Trisha Bassett will subjectively report minimally 25% improvements in sensations of fatigue  - ONGOING  3  Trisha Bassett will be confident in being placed on a 30-day hold from skilled PT care to continue to progress her level of activity independently   - ONGOING    Plan: Session duration limited to subjective fatigue and less than favorable shoe wear for balance activities  Continue skilled PT care and guidance 1x/week       Precautions: Post-COVID 19, hyperthyroidism, anxiety      Manuals 6/7  IE 6/24                                                               Neuro Re-Ed                                                                                                        Ther Ex             Pt Education Post-Covid Exercise programming for HEP x15 min Post-Covid exercise programming review of progress; discussion of doubling activity frequency, progressing mRPE workload target intensity to 6/10           TM  3 6 mph  5% grade  x15 min (5 min cool down)                                                                                         Ther Activity                                       Gait Training                                       Modalities

## 2021-07-06 ENCOUNTER — APPOINTMENT (OUTPATIENT)
Dept: PHYSICAL THERAPY | Facility: CLINIC | Age: 66
End: 2021-07-06
Payer: COMMERCIAL

## 2021-07-06 ENCOUNTER — APPOINTMENT (OUTPATIENT)
Dept: SPEECH THERAPY | Facility: CLINIC | Age: 66
End: 2021-07-06
Payer: COMMERCIAL

## 2021-07-08 ENCOUNTER — OFFICE VISIT (OUTPATIENT)
Dept: SPEECH THERAPY | Facility: CLINIC | Age: 66
End: 2021-07-08
Payer: COMMERCIAL

## 2021-07-08 ENCOUNTER — OFFICE VISIT (OUTPATIENT)
Dept: PHYSICAL THERAPY | Facility: CLINIC | Age: 66
End: 2021-07-08
Payer: COMMERCIAL

## 2021-07-08 DIAGNOSIS — U09.9 CHRONIC POST-COVID-19 SYNDROME: ICD-10-CM

## 2021-07-08 DIAGNOSIS — R49.9 UNSPECIFIED VOICE AND RESONANCE DISORDER: ICD-10-CM

## 2021-07-08 DIAGNOSIS — G93.3 POSTVIRAL FATIGUE SYNDROME: ICD-10-CM

## 2021-07-08 DIAGNOSIS — Z86.16 PERSONAL HISTORY OF COVID-19: Primary | ICD-10-CM

## 2021-07-08 DIAGNOSIS — R41.841 COGNITIVE COMMUNICATION DEFICIT: Primary | ICD-10-CM

## 2021-07-08 PROCEDURE — 97112 NEUROMUSCULAR REEDUCATION: CPT

## 2021-07-08 PROCEDURE — 97110 THERAPEUTIC EXERCISES: CPT

## 2021-07-08 PROCEDURE — 92507 TX SP LANG VOICE COMM INDIV: CPT

## 2021-07-08 NOTE — PROGRESS NOTES
Daily Note     Today's date: 2021   Patient name: Sun Ernandez  : 1955  MRN: [id number]  Referring provider: Lord Kriss MD  Dx:   Encounter Diagnosis     ICD-10-CM    1  Personal history of COVID-19  Z86 16    2  Chronic post-COVID-19 syndrome  B94 8    3  Postviral fatigue syndrome  G93 3        Start Time: 1400  Stop Time: 1500  Total time in clinic (min): 60 minutes    Subjective: Presents to her OP PT session following speech therapy; has not been able to walk as much as she has wanted to based on our review of her updated HEP due to the excessive heat these past two weeks  Continues to have additional abnormal c/o fatigue  Still considering recommendation for sleep study consult  Objective: See treatment diary below      Assessment: Terri Concepcion did very well today participating in all neuromuscular and therapeutic exercises focused on improving her subjective sensations of chronic fatigue - she was able to push herself to maintain a mRPE intensity of 6/10 throughout the session, and subjectively reported improvements in fatigue at the end of the session  She would continue to benefit from skilled PT care to maximize her current level of function s/p COVID-19  Plan: Continue per plan of care  Progress treatment as tolerated         Precautions: Post-COVID 19, hyperthyroidism, anxiety      Manuals   IE                                                               Neuro Re-Ed             Side Stepping   4# BL AW  Grn TB  x100'          Monster Walks   4# BL AW  Grn TB  x100'          Step Ups   // bars  UL UE  4# BL AW  7 5" to/from foam  x10 ea fwd    From foam x10 ea lat          Squats    On foam, no UE 2x12    On foam, 6# med ball goblet x15                                                 Ther Ex             Pt Education Post-Covid Exercise programming for HEP x15 min Post-Covid exercise programming review of progress; discussion of doubling activity frequency, progressing mRPE workload target intensity to 6/10           TM  3 6 mph  5% grade  x15 min (5 min cool down) 2 0 mph  9% grade  mRPE 6/10  x15 min                                                                                        Ther Activity                                       Gait Training                                       Modalities

## 2021-07-08 NOTE — PROGRESS NOTES
Daily Speech Treatment Note    Today's date: 2021  Patients name: Sun Ernandez  : 1955  MRN: [id number]  Safety measures: post COVID  Referring provider: Lord Kriss MD    Primary diagnosis/billing code: R47 7  Secondary diagnosis/billing code: R49 9    Visit Trackin/75 (update from last note)    Subjective/Behavioral:  Patient talkative & pleasant/ cooperative  She also notes her chronic fatigue  Objective/Assessment:  Completed structured activities with patient to target goals below  Results as stated below         VOICE/COUGH GOALS     1  Patient will be educated on vocal hygiene and demonstrate understanding of recommendations to facilitate increased quality of voice, to be achieved in 2-4 weeks      2  Patient will be educated on diaphragmatic breathing (versus clavicular breathing) to establish controlled, rhythmic breathing, to be achieved in 2-4 weeks        3  Patient will utilize semi-occluded vocal tract exercises without laryngeal tension to promote healthy vocal function with 80% accuracy, to be achieved in 4-6 weeks       4  Patient will decrease laryngeal and articulatory muscle tension by independently completing relaxation/stretching exercises, to be achieved in 6-8 weeks        5  Patient will be educated on techniques to decrease chronic cough symptoms and utilize appropriately in 2-4 weeks       6  Patient will eliminate phonotraumatic behaviors such as chronic throat clearing, by substituting non-traumatic methods to clear mucus      COG/LING GOALS     1  Patient will name an appropriate synonym/antonym for a given word with 80% accuracy to build expressive vocabulary for conversation, to be achieved in 4-6 weeks  Written providing opposite given synonym: 91%, naming words by letter task:  95% accuracy, Providing 3 synonyms given word: 90%accuracy     2   Patient will complete word generation tasks (e g , analogies, category matrices, etc ) with 80% accuracy using word finding strategies to facilitate improved word retrieval skills, to be achieved in 4-6 weeks  Category Members: given letter and type of category: 94% accuracy     INSTRUCTED PATIENT IN WORD RETRIEVAL STRATEGIES  BEING PATIENT WITH SELF AND LISTENER AT THOSE TIMES OF ANOMIA  Plan:  -Patient was provided with home exercises/activities to target goals in plan of care at the end of today's session   -Continue with current plan of care    Ask patient about scrabble next session - will bring to [facility] [place] [facility] according to the [facility] work sheets provided

## 2021-07-15 ENCOUNTER — APPOINTMENT (OUTPATIENT)
Dept: SPEECH THERAPY | Facility: CLINIC | Age: 66
End: 2021-07-15
Payer: COMMERCIAL

## 2021-07-15 ENCOUNTER — APPOINTMENT (OUTPATIENT)
Dept: PHYSICAL THERAPY | Facility: CLINIC | Age: 66
End: 2021-07-15
Payer: COMMERCIAL

## 2021-07-15 NOTE — PROGRESS NOTES
Daily Speech Treatment Note    Today's date: 7/15/2021  Patients name: Destiny Werner  : 1955  MRN: [id number]  Safety measures: post COVID  Referring provider: Maria Eugenia Aleman MD    Primary diagnosis/billing code: C70 2  Secondary diagnosis/billing code: R49 9    Visit Tracking:  -Referring provider: Epic  -Billing guidelines: AMA  -Visit #*** (combined with PT and OT) -patient in PT  -Insurance: Federal CBC  -RE due 21      Subjective/Behavioral:  ***  Objective/Assessment:  Completed structured activities with patient to target goals below  Results as stated below  ***  VOICE/COUGH GOALS     1  Patient will be educated on vocal hygiene and demonstrate understanding of recommendations to facilitate increased quality of voice, to be achieved in 2-4 weeks      2  Patient will be educated on diaphragmatic breathing (versus clavicular breathing) to establish controlled, rhythmic breathing, to be achieved in 2-4 weeks        3  Patient will utilize semi-occluded vocal tract exercises without laryngeal tension to promote healthy vocal function with 80% accuracy, to be achieved in 4-6 weeks       4  Patient will decrease laryngeal and articulatory muscle tension by independently completing relaxation/stretching exercises, to be achieved in 6-8 weeks        5  Patient will be educated on techniques to decrease chronic cough symptoms and utilize appropriately in 2-4 weeks       6  Patient will eliminate phonotraumatic behaviors such as chronic throat clearing, by substituting non-traumatic methods to clear mucus      COG/LING GOALS     1   Patient will name an appropriate synonym/antonym for a given word with 80% accuracy to build expressive vocabulary for conversation, to be achieved in 4-6 weeks       2  Patient will complete word generation tasks (e g , analogies, category matrices, etc ) with 80% accuracy using word finding strategies to facilitate improved word retrieval skills, to be achieved in 4-6 weeks  Plan:  -Patient was provided with home exercises/activities to target goals in plan of care at the end of today's session   -Continue with current plan of care    Ask patient about scrabble next session - will bring to [place], home work sheets provided ***

## 2021-07-23 ENCOUNTER — OFFICE VISIT (OUTPATIENT)
Dept: PHYSICAL THERAPY | Facility: CLINIC | Age: 66
End: 2021-07-23
Payer: COMMERCIAL

## 2021-07-23 ENCOUNTER — OFFICE VISIT (OUTPATIENT)
Dept: SPEECH THERAPY | Facility: CLINIC | Age: 66
End: 2021-07-23
Payer: COMMERCIAL

## 2021-07-23 DIAGNOSIS — G93.3 POSTVIRAL FATIGUE SYNDROME: ICD-10-CM

## 2021-07-23 DIAGNOSIS — R49.9 UNSPECIFIED VOICE AND RESONANCE DISORDER: ICD-10-CM

## 2021-07-23 DIAGNOSIS — U09.9 CHRONIC POST-COVID-19 SYNDROME: ICD-10-CM

## 2021-07-23 DIAGNOSIS — R41.841 COGNITIVE COMMUNICATION DEFICIT: Primary | ICD-10-CM

## 2021-07-23 DIAGNOSIS — R48.8 OTHER SYMBOLIC DYSFUNCTIONS: ICD-10-CM

## 2021-07-23 DIAGNOSIS — Z86.16 PERSONAL HISTORY OF COVID-19: Primary | ICD-10-CM

## 2021-07-23 DIAGNOSIS — Z86.16 PERSONAL HISTORY OF COVID-19: ICD-10-CM

## 2021-07-23 PROCEDURE — 97112 NEUROMUSCULAR REEDUCATION: CPT | Performed by: PHYSICAL THERAPIST

## 2021-07-23 PROCEDURE — 92507 TX SP LANG VOICE COMM INDIV: CPT

## 2021-07-23 PROCEDURE — 97110 THERAPEUTIC EXERCISES: CPT | Performed by: PHYSICAL THERAPIST

## 2021-07-23 NOTE — PROGRESS NOTES
Daily Speech Treatment Note    Today's date: 2021  Patients name: Hanny Carmichael  : 1955  MRN: [id number]  Safety measures: post COVID  Referring provider: Tea Enrique MD    Primary diagnosis/billing code: O16 7  Secondary diagnosis/billing code: R49 9    Visit Tracking:  -Referring provider: Epic  -Billing guidelines: AMA  -Visit #3/75 (combined with PT and OT) -patient in PT  -Insurance: Federal CBC  -RE due 21      Subjective/Behavioral:  Patient in good spirits today    Objective/Assessment:  Completed structured activities with patient to target goals below  Results as stated below  VOICE/COUGH GOALS     1  Patient will be educated on vocal hygiene and demonstrate understanding of recommendations to facilitate increased quality of voice, to be achieved in 2-4 weeks  Throat clearing education completed today; handouts provided       2  Patient will be educated on diaphragmatic breathing (versus clavicular breathing) to establish controlled, rhythmic breathing, to be achieved in 2-4 weeks       Diaphragmatic breathing: Introduced breathing and provided handout  Also encouraged some cervical stretching        3  Patient will utilize semi-occluded vocal tract exercises without laryngeal tension to promote healthy vocal function with 80% accuracy, to be achieved in 4-6 weeks  Semi-Occluded Vocal Tract Exercises  To target relaxed laryngeal posture and coordination between phonation and respiration, the following activities were completed using principles of SOVTE  Cup Bubblin  Steady blowing - no voice  Completed avg 15 secs  Patient was able to demonstrate steady bubbling/airflow  2  Steady blowing - voice ON  Completed avg 15 secs  Patient was able to demonstrate steady bubbling/airflow  Patient cued to use front focus, feeling buzzing on lips  3  Steady blowing - voice ON and OFF  Completed 3 reps  Patient was able to keep continuous airflow    4  Steady blowing - voice ON, short/small pitch glides, approx  3-5 notes  Completed over 2 reps  5  Steady blowing - voice ON, long/full pitch glides, lowest to highest pitches  Completed over 2 reps         4  Patient will decrease laryngeal and articulatory muscle tension by independently completing relaxation/stretching exercises, to be achieved in 6-8 weeks        5  Patient will be educated on techniques to decrease chronic cough symptoms and utilize appropriately in 2-4 weeks       6  Patient will eliminate phonotraumatic behaviors such as chronic throat clearing, by substituting non-traumatic methods to clear mucus      COG/LING GOALS    Patient reports she does not enjoy crossword puzzles  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; 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; as she usually goes "off the cuff" but due to some word finding difficulties; she may have trouble with recall of certain words/terms  Patient agreeable       1  Patient will name an appropriate synonym/antonym for a given word with 80% accuracy to build expressive vocabulary for conversation, to be achieved in 4-6 weeks       2  Patient will complete word generation tasks (e g , analogies, category matrices, etc ) with 80% accuracy using word finding strategies to facilitate improved word retrieval skills, to be achieved in 4-6 weeks  Plan:  -Patient was provided with home exercises/activities to target goals in plan of care at the end of today's session   -Continue with current plan of care

## 2021-07-23 NOTE — PROGRESS NOTES
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  Objective: See treatment diary below      Assessment: Able to complete more exercises today showing improvement in activity tolerance  Had difficulty with EC foam balance activities specifically with up/down HTs requiring UE support to maintain balance  Good form noted after cues and able to continue tolerating her 4# Nan well  Recumbent bike completed to challenge LE strength and endurance needed to return to bike riding at home, pt able to complete without increases in pain  Plan: Continue with bike increasing time as pt able to challenge endurance       Precautions: Post-COVID 19, hyperthyroidism, anxiety      Manuals   IE                                                              Neuro Re-Ed             Side Stepping   4# BL AW  Grn TB  x100' 4# Nan GTB 30' R & L x4 ea         Monster Walks   4# BL AW  Grn TB  x100' 4# Nan GTB 30' fwd x8         Step Ups   // bars  UL UE  4# BL AW  7 5" to/from foam  x10 ea fwd    From foam x10 ea lat // bars 8" 4#Nan fwd and side x20 ea         Squats    On foam, no UE 2x12    On foam, 6# med ball goblet x15 Foam Nan 4# 2x20         Foam EC    FT x30s, semitandem 2x30s    HTs H/V FT EC 2x30s ea         Walking lunges    4# Nan 30' x8                      Ther Ex             Pt Education Post-Covid Exercise programming for HEP x15 min Post-Covid exercise programming review of progress; discussion of doubling activity frequency, progressing mRPE workload target intensity to 6/10           TM  3 6 mph  5% grade  x15 min (5 min cool down) 2 0 mph  9% grade  mRPE 6/10  x15 min 2 5 mph 5% x15min         Recumbent bike    L4 x5min                                                                          Ther Activity                                       Gait Training                                       Modalities

## 2021-07-27 DIAGNOSIS — K21.9 GASTROESOPHAGEAL REFLUX DISEASE: ICD-10-CM

## 2021-07-27 RX ORDER — OMEPRAZOLE 40 MG/1
40 CAPSULE, DELAYED RELEASE ORAL
Qty: 30 CAPSULE | Refills: 3 | Status: SHIPPED | OUTPATIENT
Start: 2021-07-27 | End: 2021-10-18

## 2021-07-29 ENCOUNTER — OFFICE VISIT (OUTPATIENT)
Dept: SPEECH THERAPY | Facility: CLINIC | Age: 66
End: 2021-07-29
Payer: COMMERCIAL

## 2021-07-29 ENCOUNTER — OFFICE VISIT (OUTPATIENT)
Dept: PHYSICAL THERAPY | Facility: CLINIC | Age: 66
End: 2021-07-29
Payer: COMMERCIAL

## 2021-07-29 DIAGNOSIS — R49.9 UNSPECIFIED VOICE AND RESONANCE DISORDER: ICD-10-CM

## 2021-07-29 DIAGNOSIS — H81.12 BPPV (BENIGN PAROXYSMAL POSITIONAL VERTIGO), LEFT: ICD-10-CM

## 2021-07-29 DIAGNOSIS — Z86.16 PERSONAL HISTORY OF COVID-19: ICD-10-CM

## 2021-07-29 DIAGNOSIS — R41.841 COGNITIVE COMMUNICATION DEFICIT: Primary | ICD-10-CM

## 2021-07-29 DIAGNOSIS — U09.9 CHRONIC POST-COVID-19 SYNDROME: ICD-10-CM

## 2021-07-29 DIAGNOSIS — G93.3 POSTVIRAL FATIGUE SYNDROME: ICD-10-CM

## 2021-07-29 DIAGNOSIS — R48.8 OTHER SYMBOLIC DYSFUNCTIONS: ICD-10-CM

## 2021-07-29 DIAGNOSIS — Z86.16 PERSONAL HISTORY OF COVID-19: Primary | ICD-10-CM

## 2021-07-29 PROCEDURE — 95992 CANALITH REPOSITIONING PROC: CPT | Performed by: PHYSICAL THERAPIST

## 2021-07-29 PROCEDURE — 92507 TX SP LANG VOICE COMM INDIV: CPT

## 2021-07-29 NOTE — PROGRESS NOTES
Daily Note     Today's date: 2021   Patient name: Yovany Alas  : 1955  MRN: [id number]  Referring provider: Og Webb MD  Dx:   Encounter Diagnosis     ICD-10-CM    1  Personal history of COVID-19  Z86 16    2  Chronic post-COVID-19 syndrome  B94 8    3  Postviral fatigue syndrome  G93 3    4  BPPV (benign paroxysmal positional vertigo), left  H81 12                   Subjective: New onset of dizziness this morning, unsure if it's vertigo  Is room spinning  Does not have history of vertigo  Denies falls or head injury  No issues with her neck  Going on vacation next week for a week  Objective: See treatment diary below  Sharp-Georgette: negative  Cervical AROM: WNL  DixHallpike: Right: negative; Left: negative  Roll Test: Right positive (upbeating horizontal nystagmus); Left positive (upbeating horizontal nystagmus)  *more symptomatic on the left      Assessment: Pt positive with R &L roll tests today, more symptomatic on left side  Left side treated with CRMs as below  Pt with increased nausea and vomiting after BBQ roll maneuver on 2nd and 3rd times  Reduction in symptoms with each maneuver  Reviewed post-CRM precautions with pt communicating understanding  Will follow-up with PT after her vacation  Plan: Re-test positional testing and treat as needed       Precautions: Post-COVID 19, hyperthyroidism, anxiety      Manuals   IE         CRM L     Liberatory x3; BBQ roll x3                                               Neuro Re-Ed             Side Stepping   4# BL AW  Grn TB  x100' 4# Nan GTB 30' R & L x4 ea         Monster Walks   4# BL AW  Grn TB  x100' 4# Nan GTB 30' fwd x8         Step Ups   // bars  UL UE  4# BL AW  7 5" to/from foam  x10 ea fwd    From foam x10 ea lat // bars 8" 4#Nan fwd and side x20 ea         Squats    On foam, no UE 2x12    On foam, 6# med ball goblet x15 Foam Nan 4# 2x20         Foam EC    FT x30s, semitandem 2x30s    HTs H/V FT EC 2x30s ea         Walking lunges    4# Nan 30' x8                      Ther Ex             Pt Education Post-Covid Exercise programming for HEP x15 min Post-Covid exercise programming review of progress; discussion of doubling activity frequency, progressing mRPE workload target intensity to 6/10           TM  3 6 mph  5% grade  x15 min (5 min cool down) 2 0 mph  9% grade  mRPE 6/10  x15 min 2 5 mph 5% x15min         Recumbent bike    L4 x5min                                                                          Ther Activity                                       Gait Training                                       Modalities

## 2021-07-29 NOTE — PROGRESS NOTES
Daily Speech Treatment Note    Today's date: 2021  Patients name: Yovany Alas  : 1955  MRN: [id number]  Safety measures: post COVID  Referring provider: Og Webb MD    Primary diagnosis/billing code: Q90 9  Secondary diagnosis/billing code: R49 9    Visit Tracking:  -Referring provider: Epic  -Billing guidelines: AMA  -Visit #3/75 (combined with PT and OT) -patient in PT  -Insurance: Federal CBC  -RE due 21      Subjective/Behavioral:  Patient in good spirits today    Teachers go back ; school starts     Objective/Assessment:  Completed structured activities with patient to target goals below  Results as stated below  VOICE/COUGH GOALS     1  Patient will be educated on vocal hygiene and demonstrate understanding of recommendations to facilitate increased quality of voice, to be achieved in 2-4 weeks        2  Patient will be educated on diaphragmatic breathing (versus clavicular breathing) to establish controlled, rhythmic breathing, to be achieved in 2-4 weeks          3  Patient will utilize semi-occluded vocal tract exercises without laryngeal tension to promote healthy vocal function with 80% accuracy, to be achieved in 4-6 weeks  Semi-Occluded Vocal Tract Exercises: patient reports she continues to practice and 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 to see if it helps her voice       4  Patient will decrease laryngeal and articulatory muscle tension by independently completing relaxation/stretching exercises, to be achieved in 6-8 weeks        5  Patient will be educated on techniques to decrease chronic cough symptoms and utilize appropriately in 2-4 weeks       6  Patient will eliminate phonotraumatic behaviors such as chronic throat clearing, by substituting non-traumatic methods to clear mucus      COG/LING GOALS    1   Patient will name an appropriate synonym/antonym for a given word with 80% accuracy to build expressive vocabulary for conversation, to be achieved in 4-6 weeks       2  Patient will complete word generation tasks (e g , analogies, category matrices, etc ) with 80% accuracy using word finding strategies to facilitate improved word retrieval skills, to be achieved in 4-6 weeks  Anagrams: To target auditory attention and mental manipulation during a word finding activity, patient was asked to listen to a word, and rearrange the letters within the word to create a new word (i e , late = tale)  Task completed over 30 trials  Completed 30/30 independently  Plan:  -Patient was provided with home exercises/activities to target goals in plan of care at the end of today's session   -Continue with current plan of care

## 2021-07-30 ENCOUNTER — APPOINTMENT (OUTPATIENT)
Dept: SPEECH THERAPY | Facility: CLINIC | Age: 66
End: 2021-07-30
Payer: COMMERCIAL

## 2021-07-30 ENCOUNTER — APPOINTMENT (OUTPATIENT)
Dept: PHYSICAL THERAPY | Facility: CLINIC | Age: 66
End: 2021-07-30
Payer: COMMERCIAL

## 2021-08-12 ENCOUNTER — APPOINTMENT (OUTPATIENT)
Dept: PHYSICAL THERAPY | Facility: CLINIC | Age: 66
End: 2021-08-12
Payer: COMMERCIAL

## 2021-08-12 ENCOUNTER — APPOINTMENT (OUTPATIENT)
Dept: SPEECH THERAPY | Facility: CLINIC | Age: 66
End: 2021-08-12
Payer: COMMERCIAL

## 2021-08-17 NOTE — PROGRESS NOTES
Speech-Language Pathology Re-Evaluation    Today's date: 2021  Patients name: Sherrie Tran  : 1955  MRN: [id number]  Safety measures: post COVID  Referring provider: Windy Walker MD    Primary diagnosis/billing code: B38 2  Secondary diagnosis/billing code: R49 9     Visit tracking:  -Referring provider: Epic  -Billing guidelines: AMA  -Visit # (combined with PT and OT) -patient in PT  -Insurance: Federal CBC  -RE due 2021    Subjective comments: Patient was alert and pleasant during today's session  Patient's goal(s): To improve fatigue; improve voice and word finding    Assessments    IE indicates the scores from the initial evaluation (2021)  The Test of Adult Language - Fourth Edition (TOAL-4) is a standardized, norm-referenced assessment measuring important communicative abilities in spoken and written language  The test in normed on individuals 12:0-24:11  Due to these age restrictions, these standardized scores should not be compared to standard or percentile rank  Objective measures were taken to complete a diagnostic impression and prognosis for this pt  The TOAL-4 has 6 subtests; their results are reported as percentile ranks and scaled scores  The results of the TOAL-4 are generally used for three purposes; (a) to identify adolescents and adults who score significantly below their peers and therefore might need help improving their language proficiency, (b) to determine areas of relative strength and weakness among language abilities, and (c) to serve as a research tool in studies investigating language problems in adolescents and adults  Due to time constraints, only portions of the standardized assessment were administered on this date of service  Subtest: Raw Score: IE: Status:   1  Word Opposites  30 IMPROVEMENT   3  Spoken Analogies  22 DECLINE   4   Word Similarities  30 IMPROVEMENT     **Despite patient obtaining high scores on testing, patient reported that she was "embarrased" that she had difficulty word retrieval on some testing items since she is a   **      Voice Handicap Index (VHI): The VHI is a list of 30 statements that many people have used to describe their voices and the effects of their voices on their lives  Patient indicated how frequently she has the same experience using a rating point scale (never = 0, almost never = 1, sometimes = 2, almost always = 3, and always = 4)  Results were as follows:    Subscale: Score: Self-Perceived Impairment Level: IE: Status:   Physical 15/40 Mild 16 IMPROVEMENT   Functional 10/40 Mild 8 DECLINE   Emotional 11/40 Mild 8 DECLINE          TOTAL 36/120 Mild 32 DECLINE     Goals    Short Term Goals:     VOICE/COUGH GOALS    Patient will be educated on vocal hygiene and demonstrate understanding of recommendations to facilitate increased quality of voice, to be achieved in 2-4 weeks  -- MET    Patient will be educated on diaphragmatic breathing (versus clavicular breathing) to establish controlled, rhythmic breathing, to be achieved in 2-4 weeks  -- MET    Patient will utilize semi-occluded vocal tract exercises without laryngeal tension to promote healthy vocal function with 80% accuracy, to be achieved in 4-6 weeks  -- MET    Patient will decrease laryngeal and articulatory muscle tension by independently completing relaxation/stretching exercises, to be achieved in 6-8 weeks  -- MET     Patient will be educated on techniques to decrease chronic cough symptoms and utilize appropriately in 2-4 weeks  -- MET    Patient will eliminate phonotraumatic behaviors such as chronic throat clearing, by substituting non-traumatic methods to clear mucus  -- MET     COG/LING GOALS    Patient will name an appropriate synonym/antonym for a given word with 80% accuracy to build expressive vocabulary for conversation, to be achieved in 4-6 weeks   -- PARTIALLY MET    Patient will complete word generation tasks (e g , analogies, category matrices, etc ) with 80% accuracy using word finding strategies to facilitate improved word retrieval skills, to be achieved in 4-6 weeks  -- PARTIALLY MET      Long Term Goals:     Patient will improve vocal quality for increased functional communication by discharge  -- PARTIALLY MET    Patient will independently utilize vocal function exercises to maintain best level of voice to facilitate increased generational skills into conversational speech by discharge  -- MET    Patient will demonstrate adequate verbal expression during conversation without breakdowns or word finding deficits by discharge  -- PARTIALLY MET     Impressions/Recommendations    Impressions:   -Patient continues to present with mild cognitive-linguistic deficits s/p COVID-19 infection (characteristic of a functional cognitive disorder)  Patient has been provided with education on word finding strategies, including circumlocution, to assist with word retrieval during communication breakdowns, as well as 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 to increase success with communication  Patient has been benefiting from cognitive remediation therapy to target her word finding goals  Per patient report, she continues with moments of word retrieval difficulty, as well as fatigue  Patient has also benefited from education on vocal hygiene strategies, as well as diaphragmatic breathing, relaxation/stretching, and vocal function exercises, to promote overall vocal health and optimum voice utilization  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)   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

## 2021-08-19 ENCOUNTER — OFFICE VISIT (OUTPATIENT)
Dept: PHYSICAL THERAPY | Facility: CLINIC | Age: 66
End: 2021-08-19
Payer: COMMERCIAL

## 2021-08-19 ENCOUNTER — OFFICE VISIT (OUTPATIENT)
Dept: SPEECH THERAPY | Facility: CLINIC | Age: 66
End: 2021-08-19
Payer: COMMERCIAL

## 2021-08-19 DIAGNOSIS — U09.9 CHRONIC POST-COVID-19 SYNDROME: ICD-10-CM

## 2021-08-19 DIAGNOSIS — Z86.16 PERSONAL HISTORY OF COVID-19: ICD-10-CM

## 2021-08-19 DIAGNOSIS — R49.9 UNSPECIFIED VOICE AND RESONANCE DISORDER: ICD-10-CM

## 2021-08-19 DIAGNOSIS — Z86.16 PERSONAL HISTORY OF COVID-19: Primary | ICD-10-CM

## 2021-08-19 DIAGNOSIS — G93.3 POSTVIRAL FATIGUE SYNDROME: ICD-10-CM

## 2021-08-19 DIAGNOSIS — R41.841 COGNITIVE COMMUNICATION DEFICIT: ICD-10-CM

## 2021-08-19 DIAGNOSIS — R48.8 OTHER SYMBOLIC DYSFUNCTIONS: Primary | ICD-10-CM

## 2021-08-19 PROCEDURE — 92523 SPEECH SOUND LANG COMPREHEN: CPT | Performed by: SPEECH-LANGUAGE PATHOLOGIST

## 2021-08-19 PROCEDURE — 92507 TX SP LANG VOICE COMM INDIV: CPT | Performed by: SPEECH-LANGUAGE PATHOLOGIST

## 2021-08-19 PROCEDURE — 97110 THERAPEUTIC EXERCISES: CPT | Performed by: PHYSICAL THERAPIST

## 2021-08-19 PROCEDURE — 97112 NEUROMUSCULAR REEDUCATION: CPT | Performed by: PHYSICAL THERAPIST

## 2021-08-19 NOTE — PROGRESS NOTES
Daily Note     Today's date: 2021  Patient name: Kris Kruger  : 1955  MRN: [id number]  Referring provider: Aster Serna MD  Dx:   Encounter Diagnosis     ICD-10-CM    1  Personal history of COVID-19  Z86 16    2  Chronic post-COVID-19 syndrome  B94 8    3  Postviral fatigue syndrome  G93 3                   Subjective: Denies dizziness, has actually forgotten about it  Felt crummy for three days after the maneuver, but since that has been fine  Had a god vacation, was able to ride a pedal bike  Continue with fatigue, however has gotten at least 25% better since she started PT  Reports compliance with HEP, has gotten weights and med ball from [place]  Wants to hold on therapy, starts school next week  Objective: See treatment diary below  6MWT: 1800'    DixHallpike: Right: negative; Left: negative  Roll Test: Right negative; Left negative      Assessment: Positional testing negative today  Significant improvement seen with 6MWT demonstrating progress with endurance  Able to meet all goals at this time  Pt to continue on her own with HEP  Will follow-up with PT as needed  Plan: 30 day hold       Precautions: Post-COVID 19, hyperthyroidism, anxiety      Manuals   IE        CRM L     Liberatory x3; BBQ roll x3                                               Neuro Re-Ed             Side Stepping   4# BL AW  Grn TB  x100' 4# Nan GTB 30' R & L x4 ea  In fatima GTB 2x40'  4# Monika Bread   4# BL AW  Grn TB  x100' 4# Nan GTB 30' fwd x8         Step Ups   // bars  UL UE  4# BL AW  7 5" to/from foam  x10 ea fwd    From foam x10 ea lat // bars 8" 4#Nan fwd and side x20 ea  8" & foam 4#Nan x20 fwd/side ea       Squats    On foam, no UE 2x12    On foam, 6# med ball goblet x15 Foam Nan 4# 2x20  Foam Nan 4# 2x20       Foam EC    FT x30s, semitandem 2x30s    HTs H/V FT EC 2x30s ea  Semi 4x30s    HTs H/V FT EC 2x30s ea       Walking lunges    4# Nan 30' x8  4# Nan in fatima 2x40'                    Ther Ex             Pt Education Post-Covid Exercise programming for HEP x15 min Post-Covid exercise programming review of progress; discussion of doubling activity frequency, progressing mRPE workload target intensity to 6/10           TM  3 6 mph  5% grade  x15 min (5 min cool down) 2 0 mph  9% grade  mRPE 6/10  x15 min 2 5 mph 5% x15min  2 8 mph 10% 4# Nan x10 min       Recumbent bike    L4 x5min                                                                          Ther Activity                                       Gait Training                                       Modalities

## 2021-08-24 ENCOUNTER — VBI (OUTPATIENT)
Dept: ADMINISTRATIVE | Facility: OTHER | Age: 66
End: 2021-08-24

## 2021-08-25 DIAGNOSIS — E03.9 HYPOTHYROIDISM, UNSPECIFIED TYPE: ICD-10-CM

## 2021-08-25 RX ORDER — LEVOTHYROXINE SODIUM 0.05 MG/1
50 TABLET ORAL DAILY
Qty: 30 TABLET | Refills: 0 | Status: SHIPPED | OUTPATIENT
Start: 2021-08-25 | End: 2021-10-05 | Stop reason: SDUPTHER

## 2021-10-04 ENCOUNTER — TELEPHONE (OUTPATIENT)
Dept: FAMILY MEDICINE CLINIC | Facility: CLINIC | Age: 66
End: 2021-10-04

## 2021-10-05 ENCOUNTER — TELEMEDICINE (OUTPATIENT)
Dept: FAMILY MEDICINE CLINIC | Facility: CLINIC | Age: 66
End: 2021-10-05
Payer: COMMERCIAL

## 2021-10-05 VITALS
WEIGHT: 211 LBS | HEIGHT: 66 IN | TEMPERATURE: 99 F | OXYGEN SATURATION: 97 % | HEART RATE: 82 BPM | BODY MASS INDEX: 33.91 KG/M2

## 2021-10-05 DIAGNOSIS — B34.9 VIRAL INFECTION, UNSPECIFIED: Primary | ICD-10-CM

## 2021-10-05 DIAGNOSIS — E03.9 HYPOTHYROIDISM, UNSPECIFIED TYPE: ICD-10-CM

## 2021-10-05 PROCEDURE — 99213 OFFICE O/P EST LOW 20 MIN: CPT | Performed by: PHYSICIAN ASSISTANT

## 2021-10-05 PROCEDURE — 3008F BODY MASS INDEX DOCD: CPT | Performed by: PHYSICIAN ASSISTANT

## 2021-10-05 PROCEDURE — 3725F SCREEN DEPRESSION PERFORMED: CPT | Performed by: PHYSICIAN ASSISTANT

## 2021-10-05 PROCEDURE — U0005 INFEC AGEN DETEC AMPLI PROBE: HCPCS | Performed by: PHYSICIAN ASSISTANT

## 2021-10-05 PROCEDURE — U0003 INFECTIOUS AGENT DETECTION BY NUCLEIC ACID (DNA OR RNA); SEVERE ACUTE RESPIRATORY SYNDROME CORONAVIRUS 2 (SARS-COV-2) (CORONAVIRUS DISEASE [COVID-19]), AMPLIFIED PROBE TECHNIQUE, MAKING USE OF HIGH THROUGHPUT TECHNOLOGIES AS DESCRIBED BY CMS-2020-01-R: HCPCS | Performed by: PHYSICIAN ASSISTANT

## 2021-10-05 PROCEDURE — 1036F TOBACCO NON-USER: CPT | Performed by: PHYSICIAN ASSISTANT

## 2021-10-05 RX ORDER — LEVOTHYROXINE SODIUM 0.05 MG/1
50 TABLET ORAL DAILY
Qty: 30 TABLET | Refills: 3 | Status: SHIPPED | OUTPATIENT
Start: 2021-10-05 | End: 2022-02-09 | Stop reason: SDUPTHER

## 2021-10-06 ENCOUNTER — TELEPHONE (OUTPATIENT)
Dept: FAMILY MEDICINE CLINIC | Facility: CLINIC | Age: 66
End: 2021-10-06

## 2021-10-06 LAB — SARS-COV-2 RNA RESP QL NAA+PROBE: NEGATIVE

## 2021-10-11 NOTE — PROGRESS NOTES
At this time Hugo Pepe has not contacted our offices for further consultation for skilled care; she will be discharged

## 2021-10-14 NOTE — PROGRESS NOTES
At this time Debra Canela has not contacted our offices for further consultation for skilled care; she will be discharged

## 2021-10-18 DIAGNOSIS — K21.9 GASTROESOPHAGEAL REFLUX DISEASE: ICD-10-CM

## 2021-10-18 RX ORDER — OMEPRAZOLE 40 MG/1
CAPSULE, DELAYED RELEASE ORAL
Qty: 90 CAPSULE | Refills: 1 | Status: SHIPPED | OUTPATIENT
Start: 2021-10-18 | End: 2022-04-16

## 2021-11-19 ENCOUNTER — OFFICE VISIT (OUTPATIENT)
Dept: FAMILY MEDICINE CLINIC | Facility: CLINIC | Age: 66
End: 2021-11-19
Payer: COMMERCIAL

## 2021-11-19 VITALS
DIASTOLIC BLOOD PRESSURE: 92 MMHG | HEART RATE: 98 BPM | BODY MASS INDEX: 32.82 KG/M2 | TEMPERATURE: 98.3 F | WEIGHT: 204.2 LBS | HEIGHT: 66 IN | OXYGEN SATURATION: 99 % | SYSTOLIC BLOOD PRESSURE: 138 MMHG

## 2021-11-19 DIAGNOSIS — F33.9 DEPRESSION, RECURRENT (HCC): ICD-10-CM

## 2021-11-19 DIAGNOSIS — E78.2 HYPERLIPIDEMIA, MIXED: ICD-10-CM

## 2021-11-19 DIAGNOSIS — E03.9 HYPOTHYROIDISM, UNSPECIFIED TYPE: ICD-10-CM

## 2021-11-19 DIAGNOSIS — R53.83 OTHER FATIGUE: Primary | ICD-10-CM

## 2021-11-19 DIAGNOSIS — Z23 NEED FOR IMMUNIZATION AGAINST INFLUENZA: ICD-10-CM

## 2021-11-19 DIAGNOSIS — Z23 IMMUNIZATION DUE: ICD-10-CM

## 2021-11-19 PROBLEM — B34.9 VIRAL INFECTION, UNSPECIFIED: Status: RESOLVED | Noted: 2021-10-05 | Resolved: 2021-11-19

## 2021-11-19 PROCEDURE — 90471 IMMUNIZATION ADMIN: CPT

## 2021-11-19 PROCEDURE — 3008F BODY MASS INDEX DOCD: CPT | Performed by: PHYSICIAN ASSISTANT

## 2021-11-19 PROCEDURE — 3725F SCREEN DEPRESSION PERFORMED: CPT | Performed by: PHYSICIAN ASSISTANT

## 2021-11-19 PROCEDURE — 90682 RIV4 VACC RECOMBINANT DNA IM: CPT

## 2021-11-19 PROCEDURE — 1036F TOBACCO NON-USER: CPT | Performed by: PHYSICIAN ASSISTANT

## 2021-11-19 PROCEDURE — 99214 OFFICE O/P EST MOD 30 MIN: CPT | Performed by: PHYSICIAN ASSISTANT

## 2021-11-29 ENCOUNTER — APPOINTMENT (OUTPATIENT)
Dept: LAB | Facility: HOSPITAL | Age: 66
End: 2021-11-29
Payer: COMMERCIAL

## 2021-11-29 DIAGNOSIS — R53.83 OTHER FATIGUE: ICD-10-CM

## 2021-11-29 DIAGNOSIS — E03.9 HYPOTHYROIDISM, UNSPECIFIED TYPE: ICD-10-CM

## 2021-11-29 DIAGNOSIS — Z00.00 ANNUAL PHYSICAL EXAM: ICD-10-CM

## 2021-11-29 DIAGNOSIS — E78.2 HYPERLIPIDEMIA, MIXED: ICD-10-CM

## 2021-11-29 DIAGNOSIS — F33.9 DEPRESSION, RECURRENT (HCC): ICD-10-CM

## 2021-11-29 LAB
25(OH)D3 SERPL-MCNC: 30.6 NG/ML (ref 30–100)
ALBUMIN SERPL BCP-MCNC: 3.6 G/DL (ref 3.5–5)
ALP SERPL-CCNC: 109 U/L (ref 46–116)
ALT SERPL W P-5'-P-CCNC: 28 U/L (ref 12–78)
ANION GAP SERPL CALCULATED.3IONS-SCNC: 6 MMOL/L (ref 4–13)
AST SERPL W P-5'-P-CCNC: 18 U/L (ref 5–45)
BASOPHILS # BLD AUTO: 0.07 THOUSANDS/ΜL (ref 0–0.1)
BASOPHILS NFR BLD AUTO: 1 % (ref 0–1)
BILIRUB SERPL-MCNC: 0.39 MG/DL (ref 0.2–1)
BUN SERPL-MCNC: 12 MG/DL (ref 5–25)
CALCIUM SERPL-MCNC: 9 MG/DL (ref 8.3–10.1)
CHLORIDE SERPL-SCNC: 108 MMOL/L (ref 100–108)
CHOLEST SERPL-MCNC: 240 MG/DL
CO2 SERPL-SCNC: 30 MMOL/L (ref 21–32)
CREAT SERPL-MCNC: 0.7 MG/DL (ref 0.6–1.3)
EOSINOPHIL # BLD AUTO: 0.19 THOUSAND/ΜL (ref 0–0.61)
EOSINOPHIL NFR BLD AUTO: 4 % (ref 0–6)
ERYTHROCYTE [DISTWIDTH] IN BLOOD BY AUTOMATED COUNT: 13.2 % (ref 11.6–15.1)
GFR SERPL CREATININE-BSD FRML MDRD: 92 ML/MIN/1.73SQ M
GLUCOSE P FAST SERPL-MCNC: 97 MG/DL (ref 65–99)
HCT VFR BLD AUTO: 43.6 % (ref 34.8–46.1)
HDLC SERPL-MCNC: 60 MG/DL
HGB BLD-MCNC: 14.5 G/DL (ref 11.5–15.4)
IMM GRANULOCYTES # BLD AUTO: 0.01 THOUSAND/UL (ref 0–0.2)
IMM GRANULOCYTES NFR BLD AUTO: 0 % (ref 0–2)
LDLC SERPL CALC-MCNC: 160 MG/DL (ref 0–100)
LYMPHOCYTES # BLD AUTO: 2.2 THOUSANDS/ΜL (ref 0.6–4.47)
LYMPHOCYTES NFR BLD AUTO: 41 % (ref 14–44)
MCH RBC QN AUTO: 31.6 PG (ref 26.8–34.3)
MCHC RBC AUTO-ENTMCNC: 33.3 G/DL (ref 31.4–37.4)
MCV RBC AUTO: 95 FL (ref 82–98)
MONOCYTES # BLD AUTO: 0.41 THOUSAND/ΜL (ref 0.17–1.22)
MONOCYTES NFR BLD AUTO: 8 % (ref 4–12)
NEUTROPHILS # BLD AUTO: 2.43 THOUSANDS/ΜL (ref 1.85–7.62)
NEUTS SEG NFR BLD AUTO: 46 % (ref 43–75)
NRBC BLD AUTO-RTO: 0 /100 WBCS
PLATELET # BLD AUTO: 329 THOUSANDS/UL (ref 149–390)
PMV BLD AUTO: 9.2 FL (ref 8.9–12.7)
POTASSIUM SERPL-SCNC: 4.7 MMOL/L (ref 3.5–5.3)
PROT SERPL-MCNC: 7.6 G/DL (ref 6.4–8.2)
RBC # BLD AUTO: 4.59 MILLION/UL (ref 3.81–5.12)
SODIUM SERPL-SCNC: 144 MMOL/L (ref 136–145)
T4 FREE SERPL-MCNC: 0.86 NG/DL (ref 0.76–1.46)
TRIGL SERPL-MCNC: 101 MG/DL
TSH SERPL DL<=0.05 MIU/L-ACNC: 1.79 UIU/ML (ref 0.36–3.74)
WBC # BLD AUTO: 5.31 THOUSAND/UL (ref 4.31–10.16)

## 2021-11-29 PROCEDURE — 80053 COMPREHEN METABOLIC PANEL: CPT

## 2021-11-29 PROCEDURE — 80061 LIPID PANEL: CPT

## 2021-11-29 PROCEDURE — 82306 VITAMIN D 25 HYDROXY: CPT

## 2021-11-29 PROCEDURE — 84439 ASSAY OF FREE THYROXINE: CPT

## 2021-11-29 PROCEDURE — 84443 ASSAY THYROID STIM HORMONE: CPT

## 2021-11-29 PROCEDURE — 85025 COMPLETE CBC W/AUTO DIFF WBC: CPT

## 2021-11-29 PROCEDURE — 36415 COLL VENOUS BLD VENIPUNCTURE: CPT

## 2021-12-08 ENCOUNTER — VBI (OUTPATIENT)
Dept: ADMINISTRATIVE | Facility: OTHER | Age: 66
End: 2021-12-08

## 2021-12-09 ENCOUNTER — VBI (OUTPATIENT)
Dept: ADMINISTRATIVE | Facility: OTHER | Age: 66
End: 2021-12-09

## 2022-01-31 ENCOUNTER — TELEMEDICINE (OUTPATIENT)
Dept: FAMILY MEDICINE CLINIC | Facility: CLINIC | Age: 67
End: 2022-01-31
Payer: COMMERCIAL

## 2022-01-31 VITALS
TEMPERATURE: 97.3 F | HEIGHT: 66 IN | BODY MASS INDEX: 32.78 KG/M2 | OXYGEN SATURATION: 97 % | HEART RATE: 62 BPM | WEIGHT: 204 LBS

## 2022-01-31 DIAGNOSIS — B34.9 VIRAL INFECTION, UNSPECIFIED: Primary | ICD-10-CM

## 2022-01-31 PROCEDURE — U0005 INFEC AGEN DETEC AMPLI PROBE: HCPCS | Performed by: PHYSICIAN ASSISTANT

## 2022-01-31 PROCEDURE — 99213 OFFICE O/P EST LOW 20 MIN: CPT | Performed by: PHYSICIAN ASSISTANT

## 2022-01-31 PROCEDURE — U0003 INFECTIOUS AGENT DETECTION BY NUCLEIC ACID (DNA OR RNA); SEVERE ACUTE RESPIRATORY SYNDROME CORONAVIRUS 2 (SARS-COV-2) (CORONAVIRUS DISEASE [COVID-19]), AMPLIFIED PROBE TECHNIQUE, MAKING USE OF HIGH THROUGHPUT TECHNOLOGIES AS DESCRIBED BY CMS-2020-01-R: HCPCS | Performed by: PHYSICIAN ASSISTANT

## 2022-01-31 NOTE — PROGRESS NOTES
COVID-19 Outpatient Progress Note    Assessment/Plan:    Problem List Items Addressed This Visit     None      Visit Diagnoses     Viral infection, unspecified    -  Primary    Relevant Orders    COVID Only - Office Collect         Disposition:     Recommended patient to come to the office to test for COVID-19  I have spent 8 minutes directly with the patient  Greater than 50% of this time was spent in counseling/coordination of care regarding: prognosis, risks and benefits of treatment options, instructions for management, patient and family education, importance of treatment compliance, risk factor reductions and impressions  Encounter provider John Dang PA-C    [facility] [address]   [address] [place] [address]  [phone]    Recent Visits  No visits were found meeting these conditions  Showing recent visits within past 7 days and meeting all other requirements  Today's Visits  Date Type Provider Dept   01/31/22 Telemedicine John Dang PA-C  [facility] [address] [place]   Showing today's visits and meeting all other requirements  Future Appointments  No visits were found meeting these conditions  Showing future appointments within next 150 days and meeting all other requirements     This virtual check-in was done via [employer] and patient was informed that this is a secure, HIPAA-compliant platform  She agrees to proceed  Patient agrees to participate in a virtual check in via telephone or video visit instead of presenting to the office to address urgent/immediate medical needs  Patient is aware this is a billable service  After connecting through [facility], the patient was identified by name and date of birth  Anisha Mayonel was informed that this was a telemedicine visit and that the exam was being conducted confidentially over secure lines   [facility] Kirstin acknowledged consent and understanding of privacy and security of the telemedicine visit  I informed the patient that I have reviewed her record in Epic and presented the opportunity for her to ask any questions regarding the visit today  The patient agreed to participate  Verification of patient location:  Patient is located in the following state in which I hold an active license: [place]    Subjective:   Carlton Gomez is a 74 y o  female who is concerned about COVID-19  Patient's symptoms include chills, fatigue, malaise, nasal congestion, sore throat, cough, diarrhea and headache  Patient denies fever, rhinorrhea, anosmia, loss of taste, shortness of breath, chest tightness and nausea         - Date of symptom onset: 1/28/2022      COVID-19 vaccination status: Fully vaccinated with booster    Exposure:   Contact with a person who is under investigation (PUI) for or who is positive for COVID-19 within the last 14 days?: No    Hospitalized recently for fever and/or lower respiratory symptoms?: No      Currently a healthcare worker that is involved in direct patient care?: No      Works in a special setting where the risk of COVID-19 transmission may be high? (this may include long-term care, correctional and [facility] facilities; homeless shelters; assisted-living facilities and group homes ): No      Resident in a special setting where the risk of COVID-19 transmission may be high? (this may include long-term care, correctional and [facility] facilities; homeless shelters; assisted-living facilities and group homes ): No      Lab Results   Component Value Date    SARSCOV2 Negative 10/05/2021    SARSCOV2 Detected (A) 01/06/2021     Past Medical History:   Diagnosis Date    Anxiety     Chronic kidney disease     renal sac    Depression     Disease of thyroid gland     Dysuria     LAST ASSESSED [id number]    Endometriosis     Gout     History of fractured kneecap     hairline fracture right knee due to accident      Past Surgical History:   Procedure Laterality Date    COLONOSCOPY      INGUINAL HERNIA REPAIR      LAST ASSESSED [id number]    OOPHORECTOMY  2004    thinks right side    OTHER SURGICAL HISTORY      FACE SURGERY  -- LAST ASSESSED [id number]    OTHER SURGICAL HISTORY      stiches in chin due to accident     CO COLONOSCOPY FLX DX W/COLLJ SPEC WHEN PFRMD N/A 7/12/2018    Procedure: EGD AND COLONOSCOPY;  Surgeon: Laura Cornejo MD;  Location: [facility]; Service: Gastroenterology     Current Outpatient Medications   Medication Sig Dispense Refill    ALPRAZolam (XANAX) 0 5 mg tablet Take 1 tablet (0 5 mg total) by mouth 2 (two) times a day 60 tablet 1    cloNIDine (CATAPRES) 0 1 mg tablet Take 0 1 mg by mouth 2 (two) times a day      desvenlafaxine (PRISTIQ) 100 mg 24 hr tablet Take 100 mg by mouth daily       MYDAYIS 50 MG CP24 Take 1 capsule by mouth daily  0    omeprazole (PriLOSEC) 40 MG capsule TAKE 1 CAPSULE BY MOUTH EVERY DAY BEFORE BREAKFAST 90 capsule 1    levothyroxine 50 mcg tablet Take 1 tablet (50 mcg total) by mouth daily 30 tablet 3     No current facility-administered medications for this visit  No Known Allergies    Review of Systems   Constitutional: Positive for chills and fatigue  Negative for fever  HENT: Positive for congestion, mouth sores, sinus pressure and sore throat  Negative for rhinorrhea  Respiratory: Positive for cough  Negative for chest tightness and shortness of breath  Gastrointestinal: Positive for diarrhea  Negative for nausea  Neurological: Positive for headaches  Objective:    Vitals:    01/31/22 1257   Pulse: 62   Temp: (!) 97 3 °F (36 3 °C)   SpO2: 97%   Weight: 92 5 kg (204 lb)   Height: 5' 6" (1 676 m)       Physical Exam  Vitals and nursing note reviewed  Constitutional:       Appearance: She is ill-appearing  HENT:      Head: Normocephalic and atraumatic     Eyes:      Conjunctiva/sclera: Conjunctivae normal    Pulmonary: Effort: Pulmonary effort is normal    Neurological:      Mental Status: She is alert and oriented to person, place, and time  Psychiatric:         Mood and Affect: Mood normal          Behavior: Behavior normal          VIRTUAL VISIT DISCLAIMER    Anjum Curiel verbally agrees to participate in [employer]  Pt is aware that [employer] could be limited without vital signs or the ability to perform a full hands-on physical Carina Holly understands she or the provider may request at any time to terminate the video visit and request the patient to seek care or treatment in person

## 2022-02-01 LAB — SARS-COV-2 RNA RESP QL NAA+PROBE: NEGATIVE

## 2022-02-09 DIAGNOSIS — E03.9 HYPOTHYROIDISM, UNSPECIFIED TYPE: ICD-10-CM

## 2022-02-09 RX ORDER — LEVOTHYROXINE SODIUM 0.05 MG/1
50 TABLET ORAL DAILY
Qty: 30 TABLET | Refills: 3 | Status: SHIPPED | OUTPATIENT
Start: 2022-02-09 | End: 2022-03-11

## 2022-02-11 ENCOUNTER — TELEPHONE (OUTPATIENT)
Dept: FAMILY MEDICINE CLINIC | Facility: CLINIC | Age: 67
End: 2022-02-11

## 2022-02-11 NOTE — TELEPHONE ENCOUNTER
Gave pt information -- pt will call to schedule, and also reports she contact the [facility] long covid program   She will have them request any necessary information from the office

## 2022-02-11 NOTE — TELEPHONE ENCOUNTER
T/c from pt - had covid last year (January 2021) - is having signs of long haul covid, especially fatigue -- is sleeping almost 50 out of every 72 hours  Reports she fell asleep during a meal recently  Pt becoming very concern and would like know if she should be seeing a specialist of some sort for these issues

## 2022-04-12 ENCOUNTER — TELEPHONE (OUTPATIENT)
Dept: FAMILY MEDICINE CLINIC | Facility: CLINIC | Age: 67
End: 2022-04-12

## 2022-04-16 DIAGNOSIS — K21.9 GASTROESOPHAGEAL REFLUX DISEASE: ICD-10-CM

## 2022-04-16 RX ORDER — OMEPRAZOLE 40 MG/1
CAPSULE, DELAYED RELEASE ORAL
Qty: 90 CAPSULE | Refills: 1 | Status: SHIPPED | OUTPATIENT
Start: 2022-04-16

## 2022-09-02 DIAGNOSIS — K21.9 GASTROESOPHAGEAL REFLUX DISEASE: ICD-10-CM

## 2022-09-02 RX ORDER — OMEPRAZOLE 40 MG/1
CAPSULE, DELAYED RELEASE ORAL
Qty: 90 CAPSULE | Refills: 1 | Status: SHIPPED | OUTPATIENT
Start: 2022-09-02

## 2022-09-13 ENCOUNTER — TELEMEDICINE (OUTPATIENT)
Dept: FAMILY MEDICINE CLINIC | Facility: CLINIC | Age: 67
End: 2022-09-13
Payer: COMMERCIAL

## 2022-09-13 VITALS — WEIGHT: 204 LBS | BODY MASS INDEX: 32.78 KG/M2 | HEIGHT: 66 IN

## 2022-09-13 DIAGNOSIS — B34.9 VIRAL INFECTION, UNSPECIFIED: Primary | ICD-10-CM

## 2022-09-13 PROBLEM — U09.9 COVID-19 LONG HAULER: Status: ACTIVE | Noted: 2021-04-13

## 2022-09-13 LAB
SARS-COV-2 AG UPPER RESP QL IA: NEGATIVE
VALID CONTROL: NORMAL

## 2022-09-13 PROCEDURE — 87811 SARS-COV-2 COVID19 W/OPTIC: CPT | Performed by: PHYSICIAN ASSISTANT

## 2022-09-13 PROCEDURE — 99214 OFFICE O/P EST MOD 30 MIN: CPT | Performed by: PHYSICIAN ASSISTANT

## 2022-09-13 PROCEDURE — 3725F SCREEN DEPRESSION PERFORMED: CPT | Performed by: PHYSICIAN ASSISTANT

## 2022-09-13 NOTE — PROGRESS NOTES
COVID-19 Outpatient Progress Note    Assessment/Plan:    Problem List Items Addressed This Visit    None     Visit Diagnoses     Viral infection, unspecified    -  Primary    Relevant Orders    Poct Covid 19 Rapid Antigen Test (Completed)         Disposition:     I recommended the patient to come to our office to perform rapid antigen testing for COVID-19  I have spent 7 minutes directly with the patient  Greater than 50% of this time was spent in counseling/coordination of care regarding: prognosis, risks and benefits of treatment options, instructions for management, patient and family education, importance of treatment compliance, risk factor reductions and impressions  Encounter provider: Soila Chu PA-C     Provider located at: [address]  [address] [facility]  [facility] [id number] [phone]     Recent Visits  No visits were found meeting these conditions  Showing recent visits within past 7 days and meeting all other requirements  Today's Visits  Date Type Provider Dept   09/13/22 Telemedicine Soila Chu PA-C [facility] [address]   Showing today's visits and meeting all other requirements  Future Appointments  No visits were found meeting these conditions  Showing future appointments within next 150 days and meeting all other requirements     This virtual check-in was done via [facility] and patient was informed that this is a secure, HIPAA-compliant platform  She agrees to proceed  Patient agrees to participate in a virtual check in via telephone or video visit instead of presenting to the office to address urgent/immediate medical needs  Patient is aware this is a billable service  She acknowledged consent and understanding of privacy and security of the video platform   The patient has agreed to participate and understands they can discontinue the visit at any time     After connecting through [facility], the patient was identified by name and date of birth  Babs Chambers was informed that this was a telemedicine visit and that the exam was being conducted confidentially over secure lines  My office door was closed  No one else was in the room  Babs Chambers acknowledged consent and understanding of privacy and security of the telemedicine visit  I informed the patient that I have reviewed her record in Epic and presented the opportunity for her to ask any questions regarding the visit today  The patient agreed to participate  Verification of patient location:  Patient is located in the following state in which I hold an active license: [place]    Subjective:   Babs Chambers is a 74 y o  female who is concerned about COVID-19  Patient's symptoms include fatigue, nasal congestion, sore throat, cough and headache   Patient denies shortness of breath and chest tightness      - Date of symptom onset: 9/12/2022      COVID-19 vaccination status: Fully vaccinated with booster    Exposure:   Contact with a person who is under investigation (PUI) for or who is positive for COVID-19 within the last 14 days?: Yes    Hospitalized recently for fever and/or lower respiratory symptoms?: No      Currently a healthcare worker that is involved in direct patient care?: No      Works in a special setting where the risk of COVID-19 transmission may be high? (this may include long-term care, correctional and jail facilities; homeless shelters; assisted-living facilities and group homes ): No      Resident in a special setting where the risk of COVID-19 transmission may be high? (this may include long-term care, correctional and jail facilities; homeless shelters; assisted-living facilities and group homes ): No      Lab Results   Component Value Date    SARSCOV2 Negative 01/31/2022    SARSCOV2 Detected (A) 01/06/2021    [address] Negative 09/13/2022       Review of Systems Constitutional: Positive for fatigue  HENT: Positive for congestion and sore throat  Respiratory: Positive for cough  Negative for chest tightness and shortness of breath  Neurological: Positive for headaches  Current Outpatient Medications on File Prior to Visit   Medication Sig    ALPRAZolam (XANAX) 0 5 mg tablet Take 1 tablet (0 5 mg total) by mouth 2 (two) times a day    desvenlafaxine (PRISTIQ) 100 mg 24 hr tablet Take 100 mg by mouth daily     MYDAYIS 50 MG CP24 Take 1 capsule by mouth daily    omeprazole (PriLOSEC) 40 MG capsule TAKE 1 CAPSULE BY MOUTH EVERY DAY BEFORE BREAKFAST    levothyroxine 50 mcg tablet Take 1 tablet (50 mcg total) by mouth daily    [DISCONTINUED] cloNIDine (CATAPRES) 0 1 mg tablet Take 0 1 mg by mouth 2 (two) times a day       Objective:    Ht 5' 6" (1 676 m) Comment: pulled from last OV  Wt 92 5 kg (204 lb) Comment: pulled from last OV  BMI 32 93 kg/m²      Physical Exam  Vitals and nursing note reviewed  Constitutional:       Appearance: Normal appearance  She is well-developed  Eyes:      Conjunctiva/sclera: Conjunctivae normal    Pulmonary:      Effort: Pulmonary effort is normal    Musculoskeletal:      Cervical back: Normal range of motion  Neurological:      Mental Status: She is alert     Psychiatric:         Mood and Affect: Mood normal          Behavior: Behavior normal        Merry Ramirez PA-C

## 2022-10-13 DIAGNOSIS — E78.2 HYPERLIPIDEMIA, MIXED: Primary | ICD-10-CM

## 2022-10-13 DIAGNOSIS — E78.2 HYPERLIPIDEMIA, MIXED: ICD-10-CM

## 2022-10-13 DIAGNOSIS — E03.9 HYPOTHYROIDISM, UNSPECIFIED TYPE: ICD-10-CM

## 2022-10-13 RX ORDER — ROSUVASTATIN CALCIUM 5 MG/1
5 TABLET, COATED ORAL DAILY
Qty: 30 TABLET | Refills: 5 | Status: SHIPPED | OUTPATIENT
Start: 2022-10-13

## 2022-10-13 RX ORDER — ROSUVASTATIN CALCIUM 5 MG/1
5 TABLET, COATED ORAL DAILY
Qty: 30 TABLET | Refills: 5 | Status: SHIPPED | OUTPATIENT
Start: 2022-10-13 | End: 2022-10-13 | Stop reason: SDUPTHER

## 2022-10-13 RX ORDER — LEVOTHYROXINE SODIUM 0.05 MG/1
50 TABLET ORAL DAILY
Qty: 30 TABLET | Refills: 3 | Status: SHIPPED | OUTPATIENT
Start: 2022-10-13 | End: 2022-11-12

## 2022-10-13 NOTE — TELEPHONE ENCOUNTER
Pt needs rx resent to [facility] [address] [facility] in [place]

## 2022-11-18 ENCOUNTER — TELEMEDICINE (OUTPATIENT)
Dept: FAMILY MEDICINE CLINIC | Facility: CLINIC | Age: 67
End: 2022-11-18

## 2022-11-18 VITALS — TEMPERATURE: 96.7 F | WEIGHT: 204 LBS | OXYGEN SATURATION: 97 % | HEIGHT: 66 IN | BODY MASS INDEX: 32.78 KG/M2

## 2022-11-18 DIAGNOSIS — U07.1 COVID-19: Primary | ICD-10-CM

## 2022-11-18 RX ORDER — NIRMATRELVIR AND RITONAVIR 300-100 MG
3 KIT ORAL 2 TIMES DAILY
Qty: 30 TABLET | Refills: 0 | Status: SHIPPED | OUTPATIENT
Start: 2022-11-18 | End: 2022-11-23

## 2022-11-18 NOTE — PROGRESS NOTES
COVID-19 Outpatient Progress Note    Assessment/Plan:    Problem List Items Addressed This Visit    None  Visit Diagnoses     COVID-19    -  Primary    Relevant Medications    nirmatrelvir & ritonavir (Paxlovid, 300/100,) tablet therapy pack         Disposition:     Patient meets criteria for PAXLOVID and they have been counseled appropriately according to EUA documentation released by the FDA  After discussion, patient agrees to treatment  Olya Macarioly is an investigational medicine used to treat mild-to-moderate COVID-19 in adults and children (15years of age and older weighing at least 80 pounds (40 kg)) with positive results of direct SARS-CoV-2 viral testing, and who are at high risk for progression to severe COVID-19, including hospitalization or death  PAXLOVID is investigational because it is still being studied  There is limited information about the safety and effectiveness of using PAXLOVID to treat people with mild-to-moderate COVID-19  The FDA has authorized the emergency use of PAXLOVID for the treatment of mild-tomoderate COVID-19 in adults and children (15years of age and older weighing at least 80 pounds (40 kg)) with a positive test for the virus that causes COVID-19, and who are at high risk for progression to severe COVID-19, including hospitalization or death, under an EUA  What should I tell my healthcare provider before I take PAXLOVID? Tell your healthcare provider if you:  - Have any allergies  - Have liver or kidney disease  - Are pregnant or plan to become pregnant  - Are breastfeeding a child  - Have any serious illnesses    Tell your healthcare provider about all the medicines you take, including prescription and over-the-counter medicines, vitamins, and herbal supplements  Some medicines may interact with PAXLOVID and may cause serious side effects  Keep a list of your medicines to show your healthcare provider and pharmacist when you get a new medicine      You can ask your healthcare provider or pharmacist for a list of medicines that interact with PAXLOVID  Do not start taking a new medicine without telling your healthcare provider  Your healthcare provider can tell you if it is safe to take PAXLOVID with other medicines  Tell your healthcare provider if you are taking combined hormonal contraceptive  PAXLOVID may affect how your birth control pills work  Females who are able to become pregnant should use another effective alternative form of contraception or an additional barrier method of contraception  Talk to your healthcare provider if you have any questions about contraceptive methods that might be right for you  How do I take PAXLOVID? PAXLOVID consists of 2 medicines: nirmatrelvir and ritonavir  - Take 2 pink tablets of nirmatrelvir with 1 white tablet of ritonavir by mouth 2 times each day (in the morning and in the evening) for 5 days  For each dose, take all 3 tablets at the same time  - If you have kidney disease, talk to your healthcare provider  You may need a different dose  - Swallow the tablets whole  Do not chew, break, or crush the tablets  - Take PAXLOVID with or without food  - Do not stop taking PAXLOVID without talking to your healthcare provider, even if you feel better  - If you miss a dose of PAXLOVID within 8 hours of the time it is usually taken, take it as soon as you remember  If you miss a dose by more than 8 hours, skip the missed dose and take the next dose at your regular time  Do not take 2 doses of PAXLOVID at the same time  - If you take too much PAXLOVID, call your healthcare provider or go to the nearest hospital emergency room right away    - If you are taking a ritonavir- or cobicistat-containing medicine to treat hepatitis C or Human Immunodeficiency Virus (HIV), you should continue to take your medicine as prescribed by your healthcare provider   - Talk to your healthcare provider if you do not feel better or if you feel worse after 5 days  Who should generally not take PAXLOVID? Do not take PAXLOVID if:  You are allergic to nirmatrelvir, ritonavir, or any of the ingredients in PAXLOVID  You are taking any of the following medicines:  - Alfuzosin  - Pethidine, piroxicam, propoxyphene  - Ranolazine  - Amiodarone, dronedarone, flecainide, propafenone, quinidine  - Colchicine  - Lurasidone, pimozide, clozapine  - Dihydroergotamine, ergotamine, methylergonovine  - Lovastatin, simvastatin  - Sildenafil (Revatio®) for pulmonary arterial hypertension (PAH)  - Triazolam, oral midazolam  - Apalutamide  - Carbamazepine, phenobarbital, phenytoin  - Rifampin  - St  Ras’s Wort (hypericum perforatum)    What are the important possible side effects of PAXLOVID? Possible side effects of PAXLOVID are:  - Liver Problems  Tell your healthcare provider right away if you have any of these signs and symptoms of liver problems: loss of appetite, yellowing of your skin and the whites of eyes (jaundice), dark-colored urine, pale colored stools and itchy skin, stomach area (abdominal) pain  - Resistance to HIV Medicines  If you have untreated HIV infection, PAXLOVID may lead to some HIV medicines not working as well in the future  - Other possible side effects include: altered sense of taste, diarrhea, high blood pressure, or muscle aches    These are not all the possible side effects of PAXLOVID  Not many people have taken PAXLOVID  Serious and unexpected side effects may happen  Leanna Yousif is still being studied, so it is possible that all of the risks are not known at this time  What other treatment choices are there? Like Farnny Fails may allow for the emergency use of other medicines to treat people with COVID-19   Go to https://[employer]/ for information on the emergency use of other medicines that are authorized by FDA to treat people with COVID-19  Your healthcare provider may talk with you about clinical trials for which you may be eligible  It is your choice to be treated or not to be treated with PAXLOVID  Should you decide not to receive it or for your child not to receive it, it will not change your standard medical care  What if I am pregnant or breastfeeding? There is no experience treating pregnant women or breastfeeding mothers with PAXLOVID  For a mother and unborn baby, the benefit of taking PAXLOVID may be greater than the risk from the treatment  If you are pregnant, discuss your options and specific situation with your healthcare provider  It is recommended that you use effective barrier contraception or do not have sexual activity while taking PAXLOVID  If you are breastfeeding, discuss your options and specific situation with your healthcare provider  How do I report side effects with PAXLOVID? Contact your healthcare provider if you have any side effects that bother you or do not go away  Report side effects to FDA MedWatch at www [employer] gov/medwatch or call 5-164-SHH4879 or you can report side effects to [facility]  at the contact information provided below  Website Fax number Telephone number   [facility] [phone]     How should I store [address]? Store PAXLOVID tablets at room temperature between 68°F to 77°F (20°C to 25°C)  Full fact sheet for patients, parents, and caregivers can be found at: [employer]    I have spent 10 minutes directly with the patient  Encounter provider: Marli Conroy MD     Provider located at: [address]  [address] [place] [address],[address]  [address]  [phone]     Recent Visits  No visits were found meeting these conditions    Showing recent visits within past 7 days and meeting all other requirements  Today's Visits  Date Type Provider Dept   11/18/22 Telemedicine MD Ritchie Fenton [address] today's visits and meeting all other requirements  Future Appointments  No visits were found meeting these conditions  Showing future appointments within next 150 days and meeting all other requirements         Verification of patient location:  Patient is located in the following state in which I hold an active license: [place]    Subjective:   Rebekah Luis is a 74 y o  female who has been screened for COVID-19  Symptom change since last report: unchanged  Patient's symptoms include chills, fatigue, malaise, nasal congestion, rhinorrhea, sore throat, chest tightness and headache  Patient denies anosmia, loss of taste, abdominal pain, nausea, vomiting, diarrhea and myalgias  - Date of symptom onset: 11/16/2022  - Date of exposure: 11/14/2022  - Date of positive COVID-19 test: 11/17/2022  Type of test: Home antigen  Home antigen result verified by provider  Will get picture of test uploaded/scanned into patient's chart  Patient with typical symptoms of COVID-19 and they attest that they were positive on home rapid antigen testing  Image of positive result is not able to be uploaded into their chart  COVID-19 vaccination status: Fully vaccinated with booster    Taylor Sandra has been staying home and has isolated themselves in her home  She is taking care to not share personal items and is cleaning all surfaces that are touched often, like counters, tabletops, and doorknobs using household cleaning sprays or wipes  She is wearing a mask when she leaves her room  Lab Results   Component Value Date    SARSCOV2 Negative 01/31/2022    SARSCOV2 Detected (A) 01/06/2021    SARSCOVAG Negative 09/13/2022       Review of Systems   Constitutional: Positive for chills and fatigue  Negative for activity change and appetite change     HENT: Positive for congestion, rhinorrhea, sinus pressure, sinus pain and sore throat  Respiratory: Positive for chest tightness  Gastrointestinal: Negative for abdominal pain, diarrhea, nausea and vomiting  Musculoskeletal: Negative for myalgias  Neurological: Positive for dizziness and headaches  Current Outpatient Medications on File Prior to Visit   Medication Sig   • desvenlafaxine (PRISTIQ) 100 mg 24 hr tablet Take 100 mg by mouth daily    • levothyroxine 50 mcg tablet TAKE 1 TABLET BY MOUTH EVERY DAY   • omeprazole (PriLOSEC) 40 MG capsule TAKE 1 CAPSULE BY MOUTH EVERY DAY BEFORE BREAKFAST   • rosuvastatin (CRESTOR) 5 mg tablet Take 1 tablet (5 mg total) by mouth daily   • ALPRAZolam (XANAX) 0 5 mg tablet Take 1 tablet (0 5 mg total) by mouth 2 (two) times a day (Patient not taking: Reported on 11/18/2022)   • MYDAYIS 50 MG CP24 Take 1 capsule by mouth daily (Patient not taking: Reported on 11/18/2022)       Objective:    Temp (!) 96 7 °F (35 9 °C) Comment: Pt notified me  Ht 5' 6" (1 676 m) Comment: Pulled from last visit  Wt 92 5 kg (204 lb) Comment: Pulled from last visit  SpO2 97% Comment: Pt notified me  BMI 32 93 kg/m²      Physical Exam  Constitutional:       General: She is not in acute distress  Appearance: She is well-developed  She is not ill-appearing  HENT:      Head: Normocephalic and atraumatic  Pulmonary:      Effort: Pulmonary effort is normal  No respiratory distress  Neurological:      General: No focal deficit present  Mental Status: She is alert and oriented to person, place, and time     Psychiatric:         Mood and Affect: Mood normal          Behavior: Behavior normal        Javier Gonsales MD

## 2022-11-19 DIAGNOSIS — E78.2 HYPERLIPIDEMIA, MIXED: ICD-10-CM

## 2022-11-21 RX ORDER — ROSUVASTATIN CALCIUM 5 MG/1
5 TABLET, COATED ORAL DAILY
Qty: 90 TABLET | Refills: 2 | Status: SHIPPED | OUTPATIENT
Start: 2022-11-21

## 2023-02-08 DIAGNOSIS — E03.9 HYPOTHYROIDISM, UNSPECIFIED TYPE: ICD-10-CM

## 2023-02-08 RX ORDER — LEVOTHYROXINE SODIUM 0.05 MG/1
TABLET ORAL
Qty: 90 TABLET | Refills: 0 | Status: SHIPPED | OUTPATIENT
Start: 2023-02-08

## 2023-05-07 DIAGNOSIS — E03.9 HYPOTHYROIDISM, UNSPECIFIED TYPE: ICD-10-CM

## 2023-05-08 RX ORDER — LEVOTHYROXINE SODIUM 0.05 MG/1
TABLET ORAL
Qty: 90 TABLET | Refills: 0 | Status: SHIPPED | OUTPATIENT
Start: 2023-05-08 | End: 2023-05-16

## 2023-05-16 DIAGNOSIS — E03.9 HYPOTHYROIDISM, UNSPECIFIED TYPE: ICD-10-CM

## 2023-05-16 RX ORDER — LEVOTHYROXINE SODIUM 0.05 MG/1
TABLET ORAL
Qty: 90 TABLET | Refills: 0 | Status: SHIPPED | OUTPATIENT
Start: 2023-05-16

## 2023-06-01 ENCOUNTER — OFFICE VISIT (OUTPATIENT)
Dept: FAMILY MEDICINE CLINIC | Facility: CLINIC | Age: 68
End: 2023-06-01

## 2023-06-01 VITALS
WEIGHT: 198 LBS | OXYGEN SATURATION: 99 % | TEMPERATURE: 98 F | DIASTOLIC BLOOD PRESSURE: 78 MMHG | BODY MASS INDEX: 31.82 KG/M2 | HEART RATE: 85 BPM | HEIGHT: 66 IN | SYSTOLIC BLOOD PRESSURE: 130 MMHG

## 2023-06-01 DIAGNOSIS — Z78.0 ASYMPTOMATIC POSTMENOPAUSAL STATE: ICD-10-CM

## 2023-06-01 DIAGNOSIS — Z00.00 MEDICARE ANNUAL WELLNESS VISIT, INITIAL: Primary | ICD-10-CM

## 2023-06-01 DIAGNOSIS — E78.2 HYPERLIPIDEMIA, MIXED: ICD-10-CM

## 2023-06-01 DIAGNOSIS — Z12.31 SCREENING MAMMOGRAM FOR BREAST CANCER: ICD-10-CM

## 2023-06-01 DIAGNOSIS — F33.9 DEPRESSION, RECURRENT (HCC): ICD-10-CM

## 2023-06-01 DIAGNOSIS — F41.9 ANXIETY: ICD-10-CM

## 2023-06-01 DIAGNOSIS — E03.9 ACQUIRED HYPOTHYROIDISM: ICD-10-CM

## 2023-06-01 PROBLEM — M25.552 PAIN OF LEFT HIP JOINT: Status: RESOLVED | Noted: 2019-08-30 | Resolved: 2023-06-01

## 2023-06-01 RX ORDER — ALPRAZOLAM 0.5 MG/1
0.5 TABLET ORAL 2 TIMES DAILY PRN
Qty: 5 TABLET | Refills: 0 | Status: SHIPPED | OUTPATIENT
Start: 2023-06-01

## 2023-06-01 NOTE — PROGRESS NOTES
Assessment and Plan:     Problem List Items Addressed This Visit        Endocrine    Hypothyroidism    Relevant Orders    TSH, 3rd generation with Free T4 reflex       Other    Anxiety    Relevant Medications    ALPRAZolam (XANAX) 0 5 mg tablet    Other Relevant Orders    Ambulatory Referral to Psychiatry    Hyperlipidemia, mixed    Relevant Orders    Lipid Panel with Direct LDL reflex    Comprehensive metabolic panel    Depression, recurrent (HCC)    Relevant Medications    ALPRAZolam (XANAX) 0 5 mg tablet    Other Relevant Orders    Ambulatory Referral to Psychiatry    Screening mammogram for breast cancer    Relevant Orders    Mammo screening bilateral w 3d & cad    Asymptomatic postmenopausal state    Relevant Orders    DXA bone density spine hip and pelvis    Medicare annual wellness visit, initial - Primary        Preventive health issues were discussed with patient, and age appropriate screening tests were ordered as noted in patient's After Visit Summary  Personalized health advice and appropriate referrals for health education or preventive services given if needed, as noted in patient's After Visit Summary  History of Present Illness:     Patient presents for a Medicare Wellness Visit    HPI   Patient Care Team:  Margarito Hardin PA-C as PCP - General (Family Medicine)  Radha Tobias MD as Endoscopist     Review of Systems:     Review of Systems   Constitutional: Positive for fatigue  Negative for appetite change, chills, diaphoresis, fever and unexpected weight change  HENT: Positive for dental problem  Negative for ear pain, hearing loss, mouth sores, nosebleeds, rhinorrhea, trouble swallowing and voice change  Eyes: Negative for photophobia, pain, discharge and visual disturbance  Respiratory: Negative for cough, chest tightness, shortness of breath and wheezing  Cardiovascular: Negative for chest pain and palpitations     Gastrointestinal: Negative for abdominal pain, blood in stool, constipation, diarrhea, nausea, rectal pain and vomiting  Endocrine: Negative for cold intolerance, polydipsia, polyphagia and polyuria  Genitourinary: Negative for decreased urine volume, difficulty urinating, dysuria, enuresis, frequency, genital sores, hematuria and urgency  Musculoskeletal: Negative for arthralgias, back pain, gait problem, joint swelling, myalgias, neck pain and neck stiffness  Skin: Negative for color change and rash  Allergic/Immunologic: Negative for environmental allergies, food allergies and immunocompromised state  Neurological: Negative for dizziness, seizures, speech difficulty, light-headedness and headaches  Hematological: Negative for adenopathy  Does not bruise/bleed easily  Psychiatric/Behavioral: Negative for behavioral problems, confusion, decreased concentration, self-injury and sleep disturbance  The patient is not nervous/anxious and is not hyperactive           Problem List:     Patient Active Problem List   Diagnosis   • Anxiety   • GERD (gastroesophageal reflux disease)   • Hyperlipidemia, mixed   • Lumbar radiculitis   • Lumbar spondylosis   • Other fatigue   • Arthritis   • Skin lesion   • Hypothyroidism   • Gallbladder colic   • [facility], [facility] ([facility] )   • COVID-19 long hauler   • Screening mammogram for breast cancer   • Asymptomatic postmenopausal state   • Medicare annual wellness visit, initial      Past Medical and Surgical History:     Past Medical History:   Diagnosis Date   • Anxiety    • Chronic kidney disease     renal sac   • Depression    • Disease of thyroid gland    • Dysuria     LAST ASSESSED [id number]   • Endometriosis    • Gout    • History of fractured kneecap     hairline fracture right knee due to accident      Past Surgical History:   Procedure Laterality Date   • COLONOSCOPY     • INGUINAL HERNIA REPAIR      LAST ASSESSED [id number]   • OOPHORECTOMY  2004    thinks right side   • OTHER SURGICAL HISTORY      FACE SURGERY  -- LAST ASSESSED [id number]   • OTHER SURGICAL HISTORY      stiches in chin due to accident    • [facility] COLONOSCOPY FLX DX W/COLLJ SPEC WHEN PFRMD N/A 7/12/2018    Procedure: EGD AND COLONOSCOPY;  Surgeon: Yesy Amaro MD;  Location: [facility]; Service: Gastroenterology      Family History:     Family History   Problem Relation Age of Onset   • Atrial fibrillation Mother    • Depression Mother    • Dementia Mother    • Alcohol abuse Father    • Heart attack Father    • Other Maternal Grandmother         BLOOD DISORDER   • Heart attack Maternal Grandfather    • Stroke Paternal Grandmother    • Heart attack Paternal Grandfather    • No Known Problems Sister    • No Known Problems Daughter    • No Known Problems Daughter    • No Known Problems Maternal Aunt    • No Known Problems Maternal Aunt    • No Known Problems Paternal Aunt    • Breast cancer Neg Hx       Social History:     Social History     Socioeconomic History   • Marital status: /Civil Union     Spouse name: None   • Number of children: None   • Years of education: None   • Highest education level: None   Occupational History   • None   Tobacco Use   • Smoking status: Never   • Smokeless tobacco: Never   Vaping Use   • Vaping Use: Never used   Substance and Sexual Activity   • Alcohol use: No   • Drug use: No   • Sexual activity: None   Other Topics Concern   • None   Social History Narrative    ALWAYS USES [employer]    DAILY [employer]    EMPLOYED     SEEING DENTIST      Social Determinants of Health     Financial Resource Strain: Low Risk  (6/1/2023)    Overall Financial Resource Strain (CARDIA)    • Difficulty of Paying Living Expenses: Not hard at all   Food Insecurity: Not on file   Transportation Needs: No Transportation Needs (6/1/2023)    PRAPARE - Transportation    • Lack of Transportation (Medical): No    • Lack of Transportation (Non-Medical):  No   Physical Activity: Not on file   Stress: Not on file   Social Connections: Not on file   Intimate Partner Violence: Not on file   Housing Stability: Not on file      Medications and Allergies:     Current Outpatient Medications   Medication Sig Dispense Refill   • ALPRAZolam (XANAX) 0 5 mg tablet Take 1 tablet (0 5 mg total) by mouth 2 (two) times a day as needed for anxiety 5 tablet 0   • desvenlafaxine (PRISTIQ) 100 mg 24 hr tablet Take 100 mg by mouth daily      • levothyroxine 50 mcg tablet TAKE 1 TABLET BY MOUTH EVERY DAY 90 tablet 0   • omeprazole (PriLOSEC) 40 MG capsule TAKE 1 CAPSULE BY MOUTH EVERY DAY BEFORE BREAKFAST 90 capsule 1   • rosuvastatin (CRESTOR) 5 mg tablet TAKE 1 TABLET (5 MG TOTAL) BY MOUTH DAILY  90 tablet 2   • MYDAYIS 50 MG CP24 Take 1 capsule by mouth daily (Patient not taking: Reported on 11/18/2022)  0     No current facility-administered medications for this visit  No Known Allergies   Immunizations:     Immunization History   Administered Date(s) Administered   • COVID-19 PFIZER VACCINE 0 3 ML IM 03/17/2021, 04/08/2021, 12/31/2021   • INFLUENZA 08/19/2016   • Influenza Quadrivalent Preservative Free 3 years and older IM 12/16/2016   • Influenza, recombinant, quadrivalent,injectable, preservative free 09/30/2020, 11/19/2021   • Tdap 04/13/2022   • Zoster 08/19/2016   • Zoster Vaccine Recombinant 11/28/2020      Health Maintenance:         Topic Date Due   • Breast Cancer Screening: Mammogram  11/13/2020   • Colorectal Cancer Screening  12/05/2024   • Hepatitis C Screening  Completed         Topic Date Due   • Pneumococcal Vaccine: 65+ Years (1 - PCV) Never done   • COVID-19 Vaccine (4 - Pfizer series) 02/25/2022   • Influenza Vaccine (Season Ended) 09/01/2023      Medicare Screening Tests and Risk Assessments:     Brian Denton is here for her Initial Wellness visit  Health Risk Assessment:   Patient rates overall health as fair  Patient feels that their physical health rating is slightly worse  Patient is satisfied with their life   Eyesight was rated as slightly worse  Hearing was rated as same  Patient feels that their emotional and mental health rating is slightly worse  Patients states they are never, rarely angry  Patient states they are never, rarely unusually tired/fatigued  Pain experienced in the last 7 days has been none  Patient states that she has experienced no weight loss or gain in last 6 months  Depression Screening:   PHQ-9 Score: 23      Fall Risk Screening: In the past year, patient has experienced: no history of falling in past year      Urinary Incontinence Screening:   Patient has not leaked urine accidently in the last six months  Home Safety:  Patient does not have trouble with stairs inside or outside of their home  Patient has working smoke alarms and has working carbon monoxide detector  Home safety hazards include: none  Nutrition:   Current diet is Regular  Medications:   Patient is currently taking over-the-counter supplements  OTC medications include: see medication list  Patient is able to manage medications  Activities of Daily Living (ADLs)/Instrumental Activities of Daily Living (IADLs):   Walk and transfer into and out of bed and chair?: Yes  Dress and groom yourself?: Yes    Bathe or shower yourself?: Yes    Feed yourself?  Yes  Do your laundry/housekeeping?: Yes  Manage your money, pay your bills and track your expenses?: Yes  Make your own meals?: Yes    Do your own shopping?: Yes    Previous Hospitalizations:   Any hospitalizations or ED visits within the last 12 months?: No      Advance Care Planning:   Living will: No    Durable POA for healthcare: No      Cognitive Screening:   Provider or family/friend/caregiver concerned regarding cognition?: No    PREVENTIVE SCREENINGS      Cardiovascular Screening:    General: Screening Not Indicated and History Lipid Disorder    Due for: Lipid Panel      Diabetes Screening:       Due for: Blood Glucose      Colorectal Cancer Screening:     General: Screening Current "Breast Cancer Screening:       Due for: Mammogram        Cervical Cancer Screening:    General: Screening Not Indicated      Osteoporosis Screening:      Due for: DXA Axial      Lung Cancer Screening:     General: Screening Not Indicated      Hepatitis C Screening:    General: Screening Current    Screening, Brief Intervention, and Referral to Treatment (SBIRT)    Screening  Typical number of drinks in a day: 0  Typical number of drinks in a week: 0  Interpretation: Low risk drinking behavior  Single Item Drug Screening:  How often have you used an illegal drug (including marijuana) or a prescription medication for non-medical reasons in the past year? never    Single Item Drug Screen Score: 0  Interpretation: Negative screen for possible drug use disorder    Brief Intervention  Alcohol & drug use screenings were reviewed  No concerns regarding substance use disorder identified  Other Counseling Topics:   Car/seat belt/driving safety and sunscreen  No results found  Physical Exam:     /78 (BP Location: Left arm, Patient Position: Sitting, Cuff Size: Adult)   Pulse 85   Temp 98 °F (36 7 °C) (Tympanic)   Ht 5' 6\" (1 676 m)   Wt 89 8 kg (198 lb)   SpO2 99%   BMI 31 96 kg/m²     Physical Exam  Vitals and nursing note reviewed  Constitutional:       Appearance: Normal appearance  HENT:      Head: Normocephalic and atraumatic  Right Ear: Tympanic membrane, ear canal and external ear normal       Left Ear: Tympanic membrane, ear canal and external ear normal       Nose: Nose normal       Mouth/Throat:      Mouth: Mucous membranes are moist       Pharynx: Oropharynx is clear  Comments: Poor dentition  Eyes:      Extraocular Movements: Extraocular movements intact  Conjunctiva/sclera: Conjunctivae normal    Neck:      Thyroid: No thyromegaly  Vascular: No carotid bruit  Cardiovascular:      Rate and Rhythm: Normal rate and regular rhythm  Pulses: Normal pulses        " Heart sounds: Normal heart sounds  Pulmonary:      Effort: Pulmonary effort is normal       Breath sounds: Normal breath sounds  Abdominal:      General: Abdomen is flat  Bowel sounds are normal       Palpations: Abdomen is soft  There is no hepatomegaly, splenomegaly or mass  Tenderness: There is no abdominal tenderness  There is no right CVA tenderness or left CVA tenderness  Musculoskeletal:         General: Normal range of motion  Cervical back: Normal range of motion and neck supple  Right lower leg: No edema  Left lower leg: No edema  Lymphadenopathy:      Cervical: No cervical adenopathy  Skin:     General: Skin is warm and dry  Neurological:      General: No focal deficit present  Mental Status: She is alert and oriented to person, place, and time  Psychiatric:         Mood and Affect: Mood normal          Behavior: Behavior normal          Thought Content:  Thought content normal          Judgment: Judgment normal           Rissa Memory, PASEDA

## 2023-06-01 NOTE — PATIENT INSTRUCTIONS
Medicare Preventive Visit Patient Instructions  Thank you for completing your Welcome to Medicare Visit or Medicare Annual Wellness Visit today  Your next wellness visit will be due in one year (6/1/2024)  The screening/preventive services that you may require over the next 5-10 years are detailed below  Some tests may not apply to you based off risk factors and/or age  Screening tests ordered at today's visit but not completed yet may show as past due  Also, please note that scanned in results may not display below  Preventive Screenings:  Service Recommendations Previous Testing/Comments   Colorectal Cancer Screening  * Colonoscopy    * Fecal Occult Blood Test (FOBT)/Fecal Immunochemical Test (FIT)  * Fecal DNA/Cologuard Test  * Flexible Sigmoidoscopy Age: 39-70 years old   Colonoscopy: every 10 years (may be performed more frequently if at higher risk)  OR  FOBT/FIT: every 1 year  OR  Cologuard: every 3 years  OR  Sigmoidoscopy: every 5 years  Screening may be recommended earlier than age 39 if at higher risk for colorectal cancer  Also, an individualized decision between you and your healthcare provider will decide whether screening between the ages of 74-80 would be appropriate  Colonoscopy: 12/05/2019  FOBT/FIT: Not on file  Cologuard: Not on file  Sigmoidoscopy: Not on file    Screening Current     Breast Cancer Screening Age: 36 years old  Frequency: every 1-2 years  Not required if history of left and right mastectomy Mammogram: 11/13/2019        Cervical Cancer Screening Between the ages of 21-29, pap smear recommended once every 3 years  Between the ages of 33-67, can perform pap smear with HPV co-testing every 5 years     Recommendations may differ for women with a history of total hysterectomy, cervical cancer, or abnormal pap smears in past  Pap Smear: 01/10/2017    Screening Not Indicated   Hepatitis C Screening Once for adults born between 1945 and 1965  More frequently in patients at high risk for Hepatitis C Hep C Antibody: 11/07/2019    Screening Current   Diabetes Screening 1-2 times per year if you're at risk for diabetes or have pre-diabetes Fasting glucose: 97 mg/dL (11/29/2021)  A1C: No results in last 5 years (No results in last 5 years)      Cholesterol Screening Once every 5 years if you don't have a lipid disorder  May order more often based on risk factors  Lipid panel: 11/29/2021    Screening Not Indicated  History Lipid Disorder     Other Preventive Screenings Covered by Medicare:  1  Abdominal Aortic Aneurysm (AAA) Screening: covered once if your at risk  You're considered to be at risk if you have a family history of AAA  2  Lung Cancer Screening: covers low dose CT scan once per year if you meet all of the following conditions: (1) Age 50-69; (2) No signs or symptoms of lung cancer; (3) Current smoker or have quit smoking within the last 15 years; (4) You have a tobacco smoking history of at least 20 pack years (packs per day multiplied by number of years you smoked); (5) You get a written order from a healthcare provider  3  Glaucoma Screening: covered annually if you're considered high risk: (1) You have diabetes OR (2) Family history of glaucoma OR (3)  aged 48 and older OR (3)  American aged 72 and older  3  Osteoporosis Screening: covered every 2 years if you meet one of the following conditions: (1) You're estrogen deficient and at risk for osteoporosis based off medical history and other findings; (2) Have a vertebral abnormality; (3) On glucocorticoid therapy for more than 3 months; (4) Have primary hyperparathyroidism; (5) On osteoporosis medications and need to assess response to drug therapy  · Last bone density test (DXA Scan): Not on file  5  HIV Screening: covered annually if you're between the age of 12-76  Also covered annually if you are younger than 13 and older than 72 with risk factors for HIV infection   For pregnant patients, it is covered up to 3 times per pregnancy  Immunizations:  Immunization Recommendations   Influenza Vaccine Annual influenza vaccination during flu season is recommended for all persons aged >= 6 months who do not have contraindications   Pneumococcal Vaccine   * Pneumococcal conjugate vaccine = PCV13 (Prevnar 13), PCV15 (Vaxneuvance), PCV20 (Prevnar 20)  * Pneumococcal polysaccharide vaccine = PPSV23 (Pneumovax) Adults 25-60 years old: 1-3 doses may be recommended based on certain risk factors  Adults 72 years old: 1-2 doses may be recommended based off what pneumonia vaccine you previously received   Hepatitis B Vaccine 3 dose series if at intermediate or high risk (ex: diabetes, end stage renal disease, liver disease)   Tetanus (Td) Vaccine - COST NOT COVERED BY MEDICARE PART B Following completion of primary series, a booster dose should be given every 10 years to maintain immunity against tetanus  Td may also be given as tetanus wound prophylaxis  Tdap Vaccine - COST NOT COVERED BY MEDICARE PART B Recommended at least once for all adults  For pregnant patients, recommended with each pregnancy  Shingles Vaccine (Shingrix) - COST NOT COVERED BY MEDICARE PART B  2 shot series recommended in those aged 48 and above     Health Maintenance Due:      Topic Date Due   • Breast Cancer Screening: Mammogram  11/13/2020   • Colorectal Cancer Screening  12/05/2024   • Hepatitis C Screening  Completed     Immunizations Due:      Topic Date Due   • Pneumococcal Vaccine: 65+ Years (1 - PCV) Never done   • COVID-19 Vaccine (4 - Pfizer series) 02/25/2022   • Influenza Vaccine (Season Ended) 09/01/2023     Advance Directives   What are advance directives? Advance directives are legal documents that state your wishes and plans for medical care  These plans are made ahead of time in case you lose your ability to make decisions for yourself   Advance directives can apply to any medical decision, such as the treatments you want, and if you want to donate organs  What are the types of advance directives? There are many types of advance directives, and each state has rules about how to use them  You may choose a combination of any of the following:  · Living will: This is a written record of the treatment you want  You can also choose which treatments you do not want, which to limit, and which to stop at a certain time  This includes surgery, medicine, IV fluid, and tube feedings  · Durable power of  for healthcare [facility]): This is a written record that states who you want to make healthcare choices for you when you are unable to make them for yourself  This person, called a proxy, is usually a family member or a friend  You may choose more than 1 proxy  · Do not resuscitate (DNR) order:  A DNR order is used in case your heart stops beating or you stop breathing  It is a request not to have certain forms of treatment, such as CPR  A DNR order may be included in other types of advance directives  · Medical directive: This covers the care that you want if you are in a coma, near death, or unable to make decisions for yourself  You can list the treatments you want for each condition  Treatment may include pain medicine, surgery, blood transfusions, dialysis, IV or tube feedings, and a ventilator (breathing machine)  · Values history: This document has questions about your views, beliefs, and how you feel and think about life  This information can help others choose the care that you would choose  Why are advance directives important? An advance directive helps you control your care  Although spoken wishes may be used, it is better to have your wishes written down  Spoken wishes can be misunderstood, or not followed  Treatments may be given even if you do not want them  An advance directive may make it easier for your family to make difficult choices about your care     Weight Management   Why it is important to manage your weight: Being overweight increases your risk of health conditions such as heart disease, high blood pressure, type 2 diabetes, and certain types of cancer  It can also increase your risk for osteoarthritis, sleep apnea, and other respiratory problems  Aim for a slow, steady weight loss  Even a small amount of weight loss can lower your risk of health problems  How to lose weight safely:  A safe and healthy way to lose weight is to eat fewer calories and get regular exercise  You can lose up about 1 pound a week by decreasing the number of calories you eat by 500 calories each day  Healthy meal plan for weight management:  A healthy meal plan includes a variety of foods, contains fewer calories, and helps you stay healthy  A healthy meal plan includes the following:  · Eat whole-grain foods more often  A healthy meal plan should contain fiber  Fiber is the part of grains, fruits, and vegetables that is not broken down by your body  Whole-grain foods are healthy and provide extra fiber in your diet  Some examples of whole-grain foods are whole-wheat breads and pastas, oatmeal, brown rice, and bulgur  · Eat a variety of vegetables every day  Include dark, leafy greens such as spinach, kale, timmy greens, and mustard greens  Eat yellow and orange vegetables such as carrots, sweet potatoes, and winter squash  · Eat a variety of fruits every day  Choose fresh or canned fruit (canned in its own juice or light syrup) instead of juice  Fruit juice has very little or no fiber  · Eat low-fat dairy foods  Drink fat-free (skim) milk or 1% milk  Eat fat-free yogurt and low-fat cottage cheese  Try low-fat cheeses such as mozzarella and other reduced-fat cheeses  · Choose meat and other protein foods that are low in fat  Choose beans or other legumes such as split peas or lentils  Choose fish, skinless poultry (chicken or turkey), or lean cuts of red meat (beef or pork)   Before you cook meat or poultry, cut off any visible fat    · Use less fat and oil  Try baking foods instead of frying them  Add less fat, such as margarine, sour cream, regular salad dressing and mayonnaise to foods  Eat fewer high-fat foods  Some examples of high-fat foods include french fries, doughnuts, ice cream, and cakes  · Eat fewer sweets  Limit foods and drinks that are high in sugar  This includes candy, cookies, regular soda, and sweetened drinks  Exercise:  Exercise at least 30 minutes per day on most days of the week  Some examples of exercise include walking, biking, dancing, and swimming  You can also fit in more physical activity by taking the stairs instead of the elevator or parking farther away from stores  Ask your healthcare provider about the best exercise plan for you  © Copyright [employer] 2018 Information is for End User's use only and may not be sold, redistributed or otherwise used for commercial purposes   All illustrations and images included in CareNotes® are the copyrighted property of [employer]  or [address] [place] [address]

## 2023-07-14 DIAGNOSIS — F51.19 OTHER HYPERSOMNIA NOT DUE TO A SUBSTANCE OR KNOWN PHYSIOLOGICAL CONDITION: ICD-10-CM

## 2023-07-14 DIAGNOSIS — R53.83 OTHER FATIGUE: Primary | ICD-10-CM

## 2023-07-31 PROBLEM — Z00.00 MEDICARE ANNUAL WELLNESS VISIT, INITIAL: Status: RESOLVED | Noted: 2023-06-01 | Resolved: 2023-07-31

## 2023-08-03 ENCOUNTER — TELEPHONE (OUTPATIENT)
Dept: SLEEP CENTER | Facility: CLINIC | Age: 68
End: 2023-08-03

## 2023-08-03 NOTE — TELEPHONE ENCOUNTER
----- Message from Trev Salomon MD sent at 8/2/2023  4:12 PM EDT -----  approved  ----- Message -----  From: Carrie Salomon  Sent: 8/1/2023   8:23 AM EDT  To: Sleep Medicine [place] Provider    This Home sleep study needs approval.     If approved please sign and return to clerical pool. If denied please include reasons why. Also provide alternative testing if warranted. Please sign and return to clerical pool.

## 2023-09-01 ENCOUNTER — OFFICE VISIT (OUTPATIENT)
Dept: OBGYN CLINIC | Facility: CLINIC | Age: 68
End: 2023-09-01
Payer: MEDICARE

## 2023-09-01 VITALS
DIASTOLIC BLOOD PRESSURE: 88 MMHG | BODY MASS INDEX: 31.27 KG/M2 | SYSTOLIC BLOOD PRESSURE: 142 MMHG | HEIGHT: 66 IN | WEIGHT: 194.6 LBS

## 2023-09-01 DIAGNOSIS — Z78.0 MENOPAUSE: ICD-10-CM

## 2023-09-01 DIAGNOSIS — N89.8 VAGINAL DISCHARGE: ICD-10-CM

## 2023-09-01 DIAGNOSIS — Z12.4 SCREENING FOR MALIGNANT NEOPLASM OF THE CERVIX: ICD-10-CM

## 2023-09-01 DIAGNOSIS — N89.8 VAGINAL DRYNESS: ICD-10-CM

## 2023-09-01 DIAGNOSIS — Z01.419 ENCOUNTER FOR GYNECOLOGICAL EXAMINATION WITHOUT ABNORMAL FINDING: Primary | ICD-10-CM

## 2023-09-01 DIAGNOSIS — Z11.51 SCREENING FOR HUMAN PAPILLOMAVIRUS: ICD-10-CM

## 2023-09-01 DIAGNOSIS — Z12.31 OTHER SCREENING MAMMOGRAM: ICD-10-CM

## 2023-09-01 PROCEDURE — 87480 CANDIDA DNA DIR PROBE: CPT | Performed by: OBSTETRICS & GYNECOLOGY

## 2023-09-01 PROCEDURE — G0476 HPV COMBO ASSAY CA SCREEN: HCPCS | Performed by: OBSTETRICS & GYNECOLOGY

## 2023-09-01 PROCEDURE — G0145 SCR C/V CYTO,THINLAYER,RESCR: HCPCS | Performed by: OBSTETRICS & GYNECOLOGY

## 2023-09-01 PROCEDURE — 87660 TRICHOMONAS VAGIN DIR PROBE: CPT | Performed by: OBSTETRICS & GYNECOLOGY

## 2023-09-01 PROCEDURE — 87510 GARDNER VAG DNA DIR PROBE: CPT | Performed by: OBSTETRICS & GYNECOLOGY

## 2023-09-01 PROCEDURE — G0101 CA SCREEN;PELVIC/BREAST EXAM: HCPCS | Performed by: OBSTETRICS & GYNECOLOGY

## 2023-09-01 RX ORDER — UREA 10 %
500 LOTION (ML) TOPICAL 2 TIMES DAILY
COMMUNITY

## 2023-09-01 RX ORDER — ESTRADIOL 0.1 MG/G
1 CREAM VAGINAL 2 TIMES WEEKLY
Qty: 42.5 G | Refills: 0 | Status: SHIPPED | OUTPATIENT
Start: 2023-09-04

## 2023-09-01 RX ORDER — BUSPIRONE HYDROCHLORIDE 10 MG/1
TABLET ORAL
COMMUNITY
Start: 2023-08-11

## 2023-09-01 RX ORDER — MELATONIN
1000 DAILY
COMMUNITY

## 2023-09-01 NOTE — PROGRESS NOTES
Subjective      Mercedes Wise is a 79 y.o. female who presents for annual well woman exam  No  PMB  Mild dyspareunia respond to [facility]   Also vaginal itching   Mother had genital warts     History of abnormal Pap smear: no last pap 5 y ago   Family history of uterine or ovarian cancer: no    Family history of breast cancer: no    Menstrual History:  OB History        2    Para   2    Term   2            AB        Living   2       SAB        IAB        Ectopic        Multiple        Live Births                      No LMP recorded. Patient is postmenopausal.       The following portions of the patient's history were reviewed and updated as appropriate: allergies, current medications, past family history, past medical history, past social history, past surgical history and problem list.    Review of Systems  Review of Systems   Constitutional: Positive for fatigue. Negative for activity change, appetite change, chills and fever. Respiratory: Negative for apnea, cough, chest tightness and shortness of breath. Cardiovascular: Negative for chest pain, palpitations and leg swelling. Gastrointestinal: Negative for abdominal pain, constipation, diarrhea, nausea and vomiting. Genitourinary: Negative for difficulty urinating, dysuria, flank pain, frequency, hematuria and urgency. Vaginal itching    Neurological: Negative for dizziness, seizures, syncope, light-headedness, numbness and headaches. Psychiatric/Behavioral: Negative for agitation and confusion.           Objective      /88 (BP Location: Left arm, Patient Position: Sitting, Cuff Size: Adult)   Ht 5' 6" (1.676 m)   Wt 88.3 kg (194 lb 9.6 oz)   BMI 31.41 kg/m²     Physical Exam  OBGyn Exam     General:   alert and oriented, in no acute distress, alert, appears stated age and cooperative   Heart: regular rate and rhythm, S1, S2 normal, no murmur, click, rub or gallop   Lungs: clear to auscultation bilaterally   Abdomen: soft, non-tender, without masses or organomegaly   Vulva: normal some area of lichen sclerosis   Vagina: normal mucosa, normal discharge   Cervix: no cervical motion tenderness and no lesions   Uterus: normal size   Adnexa:  Breast Exam:  normal adnexa  breasts appear normal, no suspicious masses, no skin or nipple changes or axillary nodes. Assessment      @well woman@ . 73-year-old female  Annual exam  History of endometriosis  History of dermoid cyst removed  No Pap since 5 years  Vaginal dryness  Depression/anxiety stable  Hypothyroid  Plan   Pap/HPV  Diet/exercise  Calcium/vitamin D  Mammogram  DEXA scan  Colonoscopy up-to-date  Vaginal estrogen twice weekly risk-benefit side effect reviewed and discussed with patient  Return to office for annual exam   All questions answered. There are no Patient Instructions on file for this visit.

## 2023-09-04 LAB
CANDIDA RRNA VAG QL PROBE: NEGATIVE
G VAGINALIS RRNA GENITAL QL PROBE: NEGATIVE
T VAGINALIS RRNA GENITAL QL PROBE: NEGATIVE

## 2023-09-05 LAB
HPV HR 12 DNA CVX QL NAA+PROBE: NEGATIVE
HPV16 DNA CVX QL NAA+PROBE: NEGATIVE
HPV18 DNA CVX QL NAA+PROBE: NEGATIVE

## 2023-09-08 LAB
LAB AP GYN PRIMARY INTERPRETATION: NORMAL
Lab: NORMAL

## 2023-09-12 DIAGNOSIS — F41.9 ANXIETY: ICD-10-CM

## 2023-09-12 RX ORDER — ALPRAZOLAM 0.5 MG/1
0.5 TABLET ORAL 2 TIMES DAILY PRN
Qty: 5 TABLET | Refills: 0 | Status: SHIPPED | OUTPATIENT
Start: 2023-09-12

## 2023-10-09 ENCOUNTER — HOSPITAL ENCOUNTER (OUTPATIENT)
Facility: CLINIC | Age: 68
Discharge: HOME | End: 2023-10-09
Payer: MEDICARE

## 2023-10-09 DIAGNOSIS — F51.19 OTHER HYPERSOMNIA NOT DUE TO A SUBSTANCE OR KNOWN PHYSIOLOGICAL CONDITION: ICD-10-CM

## 2023-10-09 DIAGNOSIS — R53.83 OTHER FATIGUE: ICD-10-CM

## 2023-10-09 PROCEDURE — G0399 HOME SLEEP TEST/TYPE 3 PORTA: HCPCS

## 2023-10-12 PROBLEM — G47.33 OSA (OBSTRUCTIVE SLEEP APNEA): Status: ACTIVE | Noted: 2023-10-12

## 2023-10-12 PROCEDURE — 95806 SLEEP STUDY UNATT&RESP EFFT: CPT | Performed by: INTERNAL MEDICINE

## 2023-10-26 ENCOUNTER — TELEPHONE (OUTPATIENT)
Dept: SLEEP CENTER | Facility: CLINIC | Age: 68
End: 2023-10-26

## 2023-10-26 NOTE — TELEPHONE ENCOUNTER
Sleep study resulted and shows mild sleep apnea and hypoxemia. Left message for patient to call office to review sleep study results. Per study order, Joe SINGLETON, PCP to provide follow up. Patient is scheduled for appointment on 12/1/23.

## 2023-11-03 ENCOUNTER — NEW PATIENT COMPREHENSIVE (OUTPATIENT)
Dept: URBAN - METROPOLITAN AREA CLINIC 6 | Facility: CLINIC | Age: 68
End: 2023-11-03

## 2023-11-03 DIAGNOSIS — H25.813: ICD-10-CM

## 2023-11-03 PROCEDURE — 99204 OFFICE O/P NEW MOD 45 MIN: CPT

## 2023-11-03 ASSESSMENT — VISUAL ACUITY
OS_GLARE: 20/60
OS_PH: 20/40
OD_SC: 20/40
OS_SC: 20/50
OD_GLARE: 20/50
OD_PH: 20/30

## 2023-11-03 ASSESSMENT — TONOMETRY
OD_IOP_MMHG: 17
OS_IOP_MMHG: 17

## 2023-11-05 DIAGNOSIS — E78.2 HYPERLIPIDEMIA, MIXED: ICD-10-CM

## 2023-11-07 RX ORDER — ROSUVASTATIN CALCIUM 5 MG/1
5 TABLET, COATED ORAL DAILY
Qty: 90 TABLET | Refills: 2 | Status: SHIPPED | OUTPATIENT
Start: 2023-11-07

## 2023-11-28 ENCOUNTER — APPOINTMENT (OUTPATIENT)
Dept: LAB | Facility: HOSPITAL | Age: 68
End: 2023-11-28
Payer: MEDICARE

## 2023-11-28 DIAGNOSIS — E78.2 HYPERLIPIDEMIA, MIXED: ICD-10-CM

## 2023-11-28 DIAGNOSIS — E03.9 ACQUIRED HYPOTHYROIDISM: ICD-10-CM

## 2023-11-28 LAB
ALBUMIN SERPL BCP-MCNC: 4.5 G/DL (ref 3.5–5)
ALP SERPL-CCNC: 96 U/L (ref 34–104)
ALT SERPL W P-5'-P-CCNC: 14 U/L (ref 7–52)
ANION GAP SERPL CALCULATED.3IONS-SCNC: 6 MMOL/L
AST SERPL W P-5'-P-CCNC: 18 U/L (ref 13–39)
BILIRUB SERPL-MCNC: 0.38 MG/DL (ref 0.2–1)
BUN SERPL-MCNC: 16 MG/DL (ref 5–25)
CALCIUM SERPL-MCNC: 9.5 MG/DL (ref 8.4–10.2)
CHLORIDE SERPL-SCNC: 103 MMOL/L (ref 96–108)
CHOLEST SERPL-MCNC: 199 MG/DL
CO2 SERPL-SCNC: 29 MMOL/L (ref 21–32)
CREAT SERPL-MCNC: 0.77 MG/DL (ref 0.6–1.3)
GFR SERPL CREATININE-BSD FRML MDRD: 80 ML/MIN/1.73SQ M
GLUCOSE P FAST SERPL-MCNC: 124 MG/DL (ref 65–99)
HDLC SERPL-MCNC: 68 MG/DL
LDLC SERPL CALC-MCNC: 62 MG/DL (ref 0–100)
POTASSIUM SERPL-SCNC: 3.8 MMOL/L (ref 3.5–5.3)
PROT SERPL-MCNC: 7.7 G/DL (ref 6.4–8.4)
SODIUM SERPL-SCNC: 138 MMOL/L (ref 135–147)
T4 FREE SERPL-MCNC: 0.62 NG/DL (ref 0.61–1.12)
TRIGL SERPL-MCNC: 347 MG/DL
TSH SERPL DL<=0.05 MIU/L-ACNC: 6.61 UIU/ML (ref 0.45–4.5)

## 2023-11-28 PROCEDURE — 80053 COMPREHEN METABOLIC PANEL: CPT

## 2023-11-28 PROCEDURE — 84443 ASSAY THYROID STIM HORMONE: CPT

## 2023-11-28 PROCEDURE — 84439 ASSAY OF FREE THYROXINE: CPT

## 2023-11-28 PROCEDURE — 36415 COLL VENOUS BLD VENIPUNCTURE: CPT

## 2023-11-28 PROCEDURE — 80061 LIPID PANEL: CPT

## 2023-11-30 ENCOUNTER — TELEPHONE (OUTPATIENT)
Dept: FAMILY MEDICINE CLINIC | Facility: CLINIC | Age: 68
End: 2023-11-30

## 2023-11-30 NOTE — TELEPHONE ENCOUNTER
Returned message - spoke w pt - pre-op appt scheduled accordingly with [facility], pt sees her eye doctor on Tuesday, she will get all the ppperk and labs done prior appt. Pt will notify her [facility] about 2nd appt. Pt stating she wants to keep both appts, even though they are only a week apart. All questions/concerns were answered. Pt has no further questions    Pt left a VM re: -pre-op exam  Transcribed VM:      Hi, I'm calling from [facility] in reference to a mutual patient, Waldon Schwab, Date of birth 1955 and I'm calling to check to see if she has medical clearance, an appointment for medical clearance scheduled for upcoming cataract surgery. I see an epic that she's scheduled for December 1st in your office, but that's actually too soon for her second cataract surgery. She's scheduled for December 18th and January 8th, so her appointment should actually be like December 8th through the 13th possibly for the clearance for it to be both. Good for both. So if someone could just give us our office a call back, it's [phone]. It's ext [phone]. Thank you.  Bye.

## 2023-12-01 ENCOUNTER — OFFICE VISIT (OUTPATIENT)
Dept: FAMILY MEDICINE CLINIC | Facility: CLINIC | Age: 68
End: 2023-12-01
Payer: MEDICARE

## 2023-12-01 VITALS
TEMPERATURE: 98.1 F | WEIGHT: 196 LBS | OXYGEN SATURATION: 99 % | DIASTOLIC BLOOD PRESSURE: 74 MMHG | SYSTOLIC BLOOD PRESSURE: 118 MMHG | HEIGHT: 66 IN | BODY MASS INDEX: 31.5 KG/M2 | HEART RATE: 66 BPM

## 2023-12-01 DIAGNOSIS — R09.81 NASAL CONGESTION: Primary | ICD-10-CM

## 2023-12-01 DIAGNOSIS — R05.1 ACUTE COUGH: ICD-10-CM

## 2023-12-01 DIAGNOSIS — E03.9 HYPOTHYROIDISM, UNSPECIFIED TYPE: ICD-10-CM

## 2023-12-01 DIAGNOSIS — E78.2 HYPERLIPIDEMIA, MIXED: ICD-10-CM

## 2023-12-01 LAB
SARS-COV-2 AG UPPER RESP QL IA: NEGATIVE
VALID CONTROL: NORMAL

## 2023-12-01 PROCEDURE — 99214 OFFICE O/P EST MOD 30 MIN: CPT | Performed by: PHYSICIAN ASSISTANT

## 2023-12-01 PROCEDURE — 87811 SARS-COV-2 COVID19 W/OPTIC: CPT | Performed by: PHYSICIAN ASSISTANT

## 2023-12-01 RX ORDER — DEXTROMETHORPHAN HYDROBROMIDE AND PROMETHAZINE HYDROCHLORIDE 15; 6.25 MG/5ML; MG/5ML
5 SYRUP ORAL 4 TIMES DAILY PRN
Qty: 118 ML | Refills: 1 | Status: SHIPPED | OUTPATIENT
Start: 2023-12-01

## 2023-12-01 RX ORDER — BREXPIPRAZOLE 1 MG/1
2 TABLET ORAL DAILY
COMMUNITY
Start: 2023-11-09

## 2023-12-01 RX ORDER — OMEGA-3 FATTY ACIDS CAP DELAYED RELEASE 1000 MG 1000 MG
CAPSULE DELAYED RELEASE ORAL
Start: 2023-12-01

## 2023-12-01 RX ORDER — LEVOTHYROXINE SODIUM 0.07 MG/1
75 TABLET ORAL DAILY
Qty: 90 TABLET | Refills: 1 | Status: SHIPPED | OUTPATIENT
Start: 2023-12-01

## 2023-12-01 NOTE — PROGRESS NOTES
Assessment/Plan:          Diagnoses and all orders for this visit:    Nasal congestion  -     POCT Rapid Covid Ag    Hypothyroidism, unspecified type  -     levothyroxine 75 mcg tablet; Take 1 tablet (75 mcg total) by mouth daily  -     TSH, 3rd generation; Future    Acute cough  -     promethazine-dextromethorphan (PHENERGAN-DM) 6.25-15 mg/5 mL oral syrup; Take 5 mL by mouth 4 (four) times a day as needed for cough    Hyperlipidemia, mixed  -     Omega-3 Fatty Acids (Fish Oil) 1000 MG CPDR; 2 capsules twice a day    Other orders  -     Rexulti 1 MG tablet; Take 2 mg by mouth daily                Subjective:      Patient ID: Tiff Hummel is a 79 y.o. female. Pt is here for her 6 month follow up. She had labs done. Pt started with congestion and cold on Sunday. Her home Covid test was negative. She has not been taking anything for her cough. The following portions of the patient's history were reviewed and updated as appropriate:   She has a past medical history of Anxiety, Chronic kidney disease, Depression, Disease of thyroid gland, Dysuria, Endometriosis, Gout, and History of fractured kneecap.,  does not have any pertinent problems on file. ,   has a past surgical history that includes Other surgical history; Inguinal hernia repair; Other surgical history; pr colonoscopy flx dx w/collj spec when pfrmd (N/A, 7/12/2018); Oophorectomy (2004); and Colonoscopy. ,  family history includes Alcohol abuse in her father; Atrial fibrillation in her mother; Dementia in her mother; Depression in her mother; Heart attack in her father, maternal grandfather, and paternal grandfather; Melanoma in her paternal grandmother; No Known Problems in her daughter, daughter, maternal aunt, maternal aunt, paternal aunt, and sister; Other in her maternal grandmother. ,   reports that she has never smoked. She has never used smokeless tobacco. She reports that she does not drink alcohol and does not use drugs. ,  has No Known Allergies. .  Current Outpatient Medications   Medication Sig Dispense Refill   • busPIRone (BUSPAR) 10 mg tablet 1 TABLET(S) 2 TIMES A DAY, ORAL ROUTE AT 8 AM AND 6 PM     • cholecalciferol (VITAMIN D3) 1,000 units tablet Take 1,000 Units by mouth daily     • Cyanocobalamin (VITAMIN B 12 PO) Take by mouth     • desvenlafaxine (PRISTIQ) 100 mg 24 hr tablet Take 100 mg by mouth daily      • estradiol (ESTRACE VAGINAL) 0.1 mg/g vaginal cream Insert 1 g into the vagina 2 (two) times a week 42.5 g 0   • levothyroxine 75 mcg tablet Take 1 tablet (75 mcg total) by mouth daily 90 tablet 1   • magnesium gluconate (MAGONATE) 500 mg tablet Take 500 mg by mouth 2 (two) times a day     • MYDAYIS 50 MG CP24 Take 1 capsule by mouth daily  0   • Omega-3 Fatty Acids (Fish Oil) 1000 MG CPDR 2 capsules twice a day     • omeprazole (PriLOSEC) 40 MG capsule TAKE 1 CAPSULE BY MOUTH EVERY DAY BEFORE BREAKFAST 90 capsule 1   • promethazine-dextromethorphan (PHENERGAN-DM) 6.25-15 mg/5 mL oral syrup Take 5 mL by mouth 4 (four) times a day as needed for cough 118 mL 1   • Rexulti 1 MG tablet Take 2 mg by mouth daily     • rosuvastatin (CRESTOR) 5 mg tablet TAKE 1 TABLET (5 MG TOTAL) BY MOUTH DAILY. 90 tablet 2     No current facility-administered medications for this visit. Review of Systems   Constitutional:  Positive for fatigue and unexpected weight change. Negative for chills, diaphoresis and fever. HENT:  Positive for congestion, dental problem and postnasal drip. Negative for ear pain, sore throat and trouble swallowing. Eyes:  Negative for pain. Respiratory:  Positive for cough. Negative for chest tightness and shortness of breath. Cardiovascular:  Negative for chest pain, palpitations and leg swelling. Gastrointestinal:  Negative for abdominal pain, constipation, diarrhea and nausea. Endocrine: Negative for polydipsia, polyphagia and polyuria. Genitourinary:  Negative for dysuria and flank pain. Musculoskeletal:  Negative for arthralgias, back pain and neck pain. Neurological:  Negative for dizziness, light-headedness and headaches. Psychiatric/Behavioral:  Negative for confusion, decreased concentration, dysphoric mood, self-injury, sleep disturbance and suicidal ideas. The patient is not nervous/anxious. Objective:  Vitals:    12/01/23 1308   BP: 118/74   BP Location: Left arm   Patient Position: Sitting   Cuff Size: Standard   Pulse: 66   Temp: 98.1 °F (36.7 °C)   TempSrc: Tympanic   SpO2: 99%   Weight: 88.9 kg (196 lb)   Height: 5' 6" (1.676 m)     Body mass index is 31.64 kg/m². Physical Exam  Vitals and nursing note reviewed. Constitutional:       Appearance: Normal appearance. She is well-developed. HENT:      Head: Normocephalic. Right Ear: Tympanic membrane, ear canal and external ear normal.      Left Ear: Tympanic membrane, ear canal and external ear normal.      Nose: Nose normal.      Mouth/Throat:      Mouth: Mucous membranes are moist.      Pharynx: Oropharynx is clear. Eyes:      Extraocular Movements: Extraocular movements intact. Conjunctiva/sclera: Conjunctivae normal.      Pupils: Pupils are equal, round, and reactive to light. Neck:      Thyroid: No thyroid mass, thyromegaly or thyroid tenderness. Vascular: No carotid bruit. Trachea: Trachea normal.   Cardiovascular:      Rate and Rhythm: Normal rate and regular rhythm. Pulses: Normal pulses. Heart sounds: Normal heart sounds. Pulmonary:      Effort: Pulmonary effort is normal.      Breath sounds: Normal breath sounds. Abdominal:      General: Bowel sounds are normal.      Palpations: Abdomen is soft. There is no mass. Tenderness: There is no abdominal tenderness. There is no right CVA tenderness or left CVA tenderness. Musculoskeletal:         General: No tenderness. Normal range of motion. Cervical back: Normal range of motion.  No pain with movement or muscular tenderness. Normal range of motion. Right lower leg: No edema. Left lower leg: No edema. Lymphadenopathy:      Cervical: No cervical adenopathy. Right cervical: No superficial, deep or posterior cervical adenopathy. Left cervical: No superficial, deep or posterior cervical adenopathy. Skin:     General: Skin is dry. Findings: No rash. Neurological:      General: No focal deficit present. Mental Status: She is alert and oriented to person, place, and time. Deep Tendon Reflexes: Reflexes are normal and symmetric. Psychiatric:         Mood and Affect: Mood normal.         Behavior: Behavior normal.         Thought Content:  Thought content normal.         Judgment: Judgment normal.         Results for orders placed or performed in visit on 12/01/23   POCT Rapid Covid Ag   Result Value Ref Range    POCT SARS-CoV-2 Ag Negative Negative    VALID CONTROL Valid

## 2023-12-04 ENCOUNTER — RA CDI HCC (OUTPATIENT)
Dept: OTHER | Facility: HOSPITAL | Age: 68
End: 2023-12-04

## 2023-12-05 ENCOUNTER — HOSPITAL ENCOUNTER (OUTPATIENT)
Age: 68
Discharge: HOME | End: 2023-12-05
Payer: MEDICARE

## 2023-12-05 ENCOUNTER — PRE-OP CATARACT MEASUREMENTS (OUTPATIENT)
Dept: URBAN - METROPOLITAN AREA CLINIC 6 | Facility: CLINIC | Age: 68
End: 2023-12-05

## 2023-12-05 VITALS — BODY MASS INDEX: 33.82 KG/M2 | WEIGHT: 203 LBS | HEIGHT: 65 IN

## 2023-12-05 DIAGNOSIS — Z78.0 MENOPAUSE: ICD-10-CM

## 2023-12-05 DIAGNOSIS — H25.813: ICD-10-CM

## 2023-12-05 PROCEDURE — 92136 OPHTHALMIC BIOMETRY: CPT

## 2023-12-05 PROCEDURE — 92012 INTRM OPH EXAM EST PATIENT: CPT

## 2023-12-05 PROCEDURE — 77080 DXA BONE DENSITY AXIAL: CPT

## 2023-12-05 ASSESSMENT — KERATOMETRY
OS_K2POWER_DIOPTERS: 44.00
OS_K1POWER_DIOPTERS: 43.25
OD_K2POWER_DIOPTERS: 43.75
OD_AXISANGLE_DEGREES: 90
OS_AXISANGLE2_DEGREES: 159
OD_K1POWER_DIOPTERS: 43.25
OS_AXISANGLE_DEGREES: 069
OD_AXISANGLE2_DEGREES: 180

## 2023-12-05 ASSESSMENT — TONOMETRY
OD_IOP_MMHG: 14
OS_IOP_MMHG: 14

## 2023-12-05 ASSESSMENT — VISUAL ACUITY
OS_SC: 20/50 +/-1
OD_PH: 20/30-1
OU_CC: J3

## 2023-12-08 ENCOUNTER — TELEPHONE (OUTPATIENT)
Dept: OBGYN CLINIC | Facility: CLINIC | Age: 68
End: 2023-12-08

## 2023-12-08 ENCOUNTER — CONSULT (OUTPATIENT)
Dept: FAMILY MEDICINE CLINIC | Facility: CLINIC | Age: 68
End: 2023-12-08
Payer: COMMERCIAL

## 2023-12-08 VITALS
TEMPERATURE: 97.5 F | SYSTOLIC BLOOD PRESSURE: 122 MMHG | HEART RATE: 81 BPM | WEIGHT: 204 LBS | DIASTOLIC BLOOD PRESSURE: 78 MMHG | BODY MASS INDEX: 33.99 KG/M2 | HEIGHT: 65 IN | OXYGEN SATURATION: 96 %

## 2023-12-08 DIAGNOSIS — E03.9 ACQUIRED HYPOTHYROIDISM: ICD-10-CM

## 2023-12-08 DIAGNOSIS — Z01.818 PRE-OP EXAMINATION: ICD-10-CM

## 2023-12-08 DIAGNOSIS — E78.2 HYPERLIPIDEMIA, MIXED: ICD-10-CM

## 2023-12-08 DIAGNOSIS — H25.9 AGE-RELATED CATARACT OF BOTH EYES, UNSPECIFIED AGE-RELATED CATARACT TYPE: Primary | ICD-10-CM

## 2023-12-08 PROCEDURE — 99214 OFFICE O/P EST MOD 30 MIN: CPT | Performed by: PHYSICIAN ASSISTANT

## 2023-12-08 NOTE — PROGRESS NOTES
Subjective:     Fabrizio Ventura is a 79 y.o. female who presents to the office today for a preoperative consultation at the request of surgeon Dr. Daphney Holter who plans on performing bilateral cataract surgery on December 18 and January 8. This consultation is requested for the specific conditions prompting preoperative evaluation (i.e. because of potential affect on operative risk): general health. Planned anesthesia: local and IV sedation. The patient has the following known anesthesia issues:  none . Patients bleeding risk: no recent abnormal bleeding. The following portions of the patient's history were reviewed and updated as appropriate: She  has a past medical history of Anxiety, Chronic kidney disease, Depression, Disease of thyroid gland, Dysuria, Endometriosis, Gout, and History of fractured kneecap. She   Patient Active Problem List    Diagnosis Date Noted   • Age-related cataract of both eyes 12/08/2023   • Pre-op examination 12/08/2023   • RUBEN (obstructive sleep apnea) 10/12/2023   • Other hypersomnia not due to a substance or known physiological condition    • Screening mammogram for breast cancer 06/01/2023   • Asymptomatic postmenopausal state 06/01/2023   • Depression, recurrent ([address]) 11/19/2021   • COVID-19 long hauler 04/13/2021   • Gallbladder colic 94/30/1741   • Skin lesion 12/31/2019   • Hypothyroidism 12/31/2019   • Lumbar spondylosis 10/29/2019   • Other fatigue 10/29/2019   • Arthritis 10/29/2019   • Lumbar radiculitis 08/30/2019   • GERD (gastroesophageal reflux disease) 10/21/2015   • Anxiety 10/22/2014   • Hyperlipidemia, mixed 10/22/2014     She  has a past surgical history that includes Other surgical history; Inguinal hernia repair; Other surgical history; pr colonoscopy flx dx w/collj spec when pfrmd (N/A, 7/12/2018); Oophorectomy (2004); and Colonoscopy.   Her family history includes Alcohol abuse in her father; Atrial fibrillation in her mother; Dementia in her mother; Depression in her mother; Heart attack in her father, maternal grandfather, and paternal grandfather; Melanoma in her paternal grandmother; No Known Problems in her daughter, daughter, maternal aunt, maternal aunt, paternal aunt, and sister; Other in her maternal grandmother. She  reports that she has never smoked. She has never used smokeless tobacco. She reports that she does not drink alcohol and does not use drugs. Current Outpatient Medications   Medication Sig Dispense Refill   • busPIRone (BUSPAR) 10 mg tablet 1 TABLET(S) 2 TIMES A DAY, ORAL ROUTE AT 8 AM AND 6 PM     • cholecalciferol (VITAMIN D3) 1,000 units tablet Take 1,000 Units by mouth daily     • Cyanocobalamin (VITAMIN B 12 PO) Take by mouth     • desvenlafaxine (PRISTIQ) 100 mg 24 hr tablet Take 100 mg by mouth daily      • estradiol (ESTRACE VAGINAL) 0.1 mg/g vaginal cream Insert 1 g into the vagina 2 (two) times a week 42.5 g 0   • levothyroxine 75 mcg tablet Take 1 tablet (75 mcg total) by mouth daily 90 tablet 1   • magnesium gluconate (MAGONATE) 500 mg tablet Take 500 mg by mouth 2 (two) times a day     • MYDAYIS 50 MG CP24 Take 1 capsule by mouth daily  0   • Omega-3 Fatty Acids (Fish Oil) 1000 MG CPDR 2 capsules twice a day     • omeprazole (PriLOSEC) 40 MG capsule TAKE 1 CAPSULE BY MOUTH EVERY DAY BEFORE BREAKFAST 90 capsule 1   • promethazine-dextromethorphan (PHENERGAN-DM) 6.25-15 mg/5 mL oral syrup Take 5 mL by mouth 4 (four) times a day as needed for cough 118 mL 1   • Rexulti 1 MG tablet Take 2 mg by mouth daily     • rosuvastatin (CRESTOR) 5 mg tablet TAKE 1 TABLET (5 MG TOTAL) BY MOUTH DAILY. 90 tablet 2     No current facility-administered medications for this visit.      Current Outpatient Medications on File Prior to Visit   Medication Sig   • busPIRone (BUSPAR) 10 mg tablet 1 TABLET(S) 2 TIMES A DAY, ORAL ROUTE AT 8 AM AND 6 PM   • cholecalciferol (VITAMIN D3) 1,000 units tablet Take 1,000 Units by mouth daily   • Cyanocobalamin (VITAMIN B 12 PO) Take by mouth   • desvenlafaxine (PRISTIQ) 100 mg 24 hr tablet Take 100 mg by mouth daily    • estradiol (ESTRACE VAGINAL) 0.1 mg/g vaginal cream Insert 1 g into the vagina 2 (two) times a week   • levothyroxine 75 mcg tablet Take 1 tablet (75 mcg total) by mouth daily   • magnesium gluconate (MAGONATE) 500 mg tablet Take 500 mg by mouth 2 (two) times a day   • MYDAYIS 50 MG CP24 Take 1 capsule by mouth daily   • Omega-3 Fatty Acids (Fish Oil) 1000 MG CPDR 2 capsules twice a day   • omeprazole (PriLOSEC) 40 MG capsule TAKE 1 CAPSULE BY MOUTH EVERY DAY BEFORE BREAKFAST   • promethazine-dextromethorphan (PHENERGAN-DM) 6.25-15 mg/5 mL oral syrup Take 5 mL by mouth 4 (four) times a day as needed for cough   • Rexulti 1 MG tablet Take 2 mg by mouth daily   • rosuvastatin (CRESTOR) 5 mg tablet TAKE 1 TABLET (5 MG TOTAL) BY MOUTH DAILY. No current facility-administered medications on file prior to visit. She has No Known Allergies. .    Review of Systems  Pertinent items are noted in HPI. Objective:     /78   Pulse 81   Temp 97.5 °F (36.4 °C)   Ht 5' 5" (1.651 m)   Wt 92.5 kg (204 lb)   SpO2 96%   BMI 33.95 kg/m²   General appearance: alert and oriented, in no acute distress  Ears: normal TM's and external ear canals both ears  Nose: Nares normal. Septum midline. Mucosa normal. No drainage or sinus tenderness. Throat: abnormal findings: missing teeth upper and lower front  Neck: no adenopathy, no carotid bruit, supple, symmetrical, trachea midline, and thyroid not enlarged, symmetric, no tenderness/mass/nodules  Back: symmetric, no curvature. ROM normal. No CVA tenderness.   Lungs: clear to auscultation bilaterally  Heart: regular rate and rhythm, S1, S2 normal, no murmur, click, rub or gallop  Abdomen: soft, non-tender; bowel sounds normal; no masses,  no organomegaly  Extremities: extremities normal, warm and well-perfused; no cyanosis, clubbing, or edema  Pulses: 2+ and symmetric  Skin: Skin color, texture, turgor normal. No rashes or lesions  Lymph nodes: Cervical, supraclavicular, and axillary nodes normal.  Neurologic: Grossly normal    Predictors of intubation difficulty:   Morbid obesity? no   Anatomically abnormal facies? no   Prominent incisors? no   Receding mandible? no   Short, thick neck? no   Neck range of motion: normal   Mallampati score: II (hard and soft palate, upper portion of tonsils anduvula visible)   Dentition: Missing teeth (upper front and lower front)    Cardiographics  ECG:  not done  Echocardiogram: not done    Imaging  Chest x-ray:  not done      Lab Review   not applicable   Results for orders placed or performed in visit on 12/01/23   POCT Rapid Covid Ag   Result Value Ref Range    POCT SARS-CoV-2 Ag Negative Negative    VALID CONTROL Valid       Assessment:     79 y.o. female with planned surgery as above. Known risk factors for perioperative complications: None    Difficulty with intubation is not anticipated. Cardiac Risk Estimation: Low, cleared for surgery    Current medications which may produce withdrawal symptoms if withheld perioperatively: none     Plan:     1. Preoperative workup as follows none. 2. Change in medication regimen before surgery:  per anesthesia . 3. Prophylaxis for cardiac events with perioperative beta-blockers: not indicated. 4. Invasive hemodynamic monitoring perioperatively: not indicated. 5. Deep vein thrombosis prophylaxis postoperatively:regimen to be chosen by surgical team.  6. Surveillance for postoperative MI with ECG immediately postoperatively and on postoperative days 1 and 2 AND troponin levels 24 hours postoperatively and on day 4 or hospital discharge (whichever comes first): not indicated.   7. Other measures:  none

## 2023-12-15 ENCOUNTER — TELEPHONE (OUTPATIENT)
Dept: FAMILY MEDICINE CLINIC | Facility: CLINIC | Age: 68
End: 2023-12-15

## 2023-12-15 DIAGNOSIS — F41.9 ANXIETY: Primary | ICD-10-CM

## 2023-12-15 RX ORDER — ALPRAZOLAM 0.5 MG/1
0.5 TABLET ORAL 2 TIMES DAILY PRN
Qty: 20 TABLET | Refills: 0 | Status: SHIPPED | OUTPATIENT
Start: 2023-12-15 | End: 2023-12-18 | Stop reason: SDUPTHER

## 2023-12-15 NOTE — TELEPHONE ENCOUNTER
Patient called requesting to have a short term script for xanax. She reports she will be having cataract surgery and is feeling very anxious and edgy about the surgery. She reports had it before, but haven't had it for quite a while.   Pt is having first procedure 12/18 and second 1/8

## 2023-12-17 ENCOUNTER — TELEPHONE (OUTPATIENT)
Dept: OTHER | Facility: OTHER | Age: 68
End: 2023-12-17

## 2023-12-17 NOTE — TELEPHONE ENCOUNTER
Pt called to follow up on her xanax script that she is to take prior to surgery tomorrow. It was sent to the wrong pharmacy. On call notified via TC.

## 2023-12-18 ENCOUNTER — SURGERY/PROCEDURE (OUTPATIENT)
Dept: URBAN - METROPOLITAN AREA SURGICAL CENTER 6 | Facility: SURGICAL CENTER | Age: 68
End: 2023-12-18

## 2023-12-18 DIAGNOSIS — F41.9 ANXIETY: ICD-10-CM

## 2023-12-18 DIAGNOSIS — H25.812: ICD-10-CM

## 2023-12-18 PROCEDURE — 66984 XCAPSL CTRC RMVL W/O ECP: CPT

## 2023-12-18 PROCEDURE — 68841 INSJ RX ELUT IMPLT LAC CANAL: CPT

## 2023-12-18 RX ORDER — ALPRAZOLAM 0.5 MG/1
0.5 TABLET ORAL 2 TIMES DAILY PRN
Qty: 20 TABLET | Refills: 0 | Status: SHIPPED | OUTPATIENT
Start: 2023-12-18

## 2023-12-18 NOTE — TELEPHONE ENCOUNTER
Spoke with pt. Patient was unable to get medication for procedure. Patient was on her way to procedure when I called. Notified her due to it being a opioid its harder for it to be sent out. Patient understood. Patient wanted me to remove [facility] of pharmacy list.

## 2023-12-19 ENCOUNTER — 1 DAY POST-OP (OUTPATIENT)
Dept: URBAN - METROPOLITAN AREA CLINIC 6 | Facility: CLINIC | Age: 68
End: 2023-12-19

## 2023-12-19 DIAGNOSIS — Z96.1: ICD-10-CM

## 2023-12-19 DIAGNOSIS — H43.811: ICD-10-CM

## 2023-12-19 DIAGNOSIS — H25.811: ICD-10-CM

## 2023-12-19 PROCEDURE — 92136 OPHTHALMIC BIOMETRY: CPT | Mod: 26,RT

## 2023-12-19 PROCEDURE — 99024 POSTOP FOLLOW-UP VISIT: CPT

## 2023-12-19 ASSESSMENT — KERATOMETRY
OS_K1POWER_DIOPTERS: 43.25
OD_AXISANGLE2_DEGREES: 180
OS_AXISANGLE_DEGREES: 069
OS_AXISANGLE2_DEGREES: 159
OD_K2POWER_DIOPTERS: 43.75
OD_AXISANGLE2_DEGREES: 180
OS_K2POWER_DIOPTERS: 44.00
OS_AXISANGLE_DEGREES: 069
OD_K2POWER_DIOPTERS: 43.75
OS_K2POWER_DIOPTERS: 44.00
OD_K1POWER_DIOPTERS: 43.25
OS_AXISANGLE2_DEGREES: 159
OD_AXISANGLE_DEGREES: 90
OD_K1POWER_DIOPTERS: 43.25
OS_K1POWER_DIOPTERS: 43.25
OD_AXISANGLE_DEGREES: 90

## 2023-12-19 ASSESSMENT — TONOMETRY
OD_IOP_MMHG: 15
OS_IOP_MMHG: 26
OS_IOP_MMHG: 22

## 2023-12-19 ASSESSMENT — VISUAL ACUITY
OD_SC: 20/40+1
OS_SC: 20/30

## 2023-12-26 ENCOUNTER — 1 WEEK POST-OP (OUTPATIENT)
Dept: URBAN - METROPOLITAN AREA CLINIC 6 | Facility: CLINIC | Age: 68
End: 2023-12-26

## 2023-12-26 DIAGNOSIS — Z96.1: ICD-10-CM

## 2023-12-26 PROCEDURE — 99024 POSTOP FOLLOW-UP VISIT: CPT

## 2023-12-26 ASSESSMENT — KERATOMETRY
OS_AXISANGLE2_DEGREES: 159
OD_AXISANGLE2_DEGREES: 180
OS_K2POWER_DIOPTERS: 44.00
OD_K1POWER_DIOPTERS: 43.25
OS_AXISANGLE_DEGREES: 069
OD_K2POWER_DIOPTERS: 43.75
OS_K1POWER_DIOPTERS: 43.25
OD_AXISANGLE_DEGREES: 90

## 2023-12-26 ASSESSMENT — VISUAL ACUITY
OD_SC: 20/40
OS_SC: 20/25

## 2023-12-26 ASSESSMENT — TONOMETRY
OS_IOP_MMHG: 22
OD_IOP_MMHG: 16

## 2024-01-08 ENCOUNTER — SURGERY/PROCEDURE (OUTPATIENT)
Dept: URBAN - METROPOLITAN AREA SURGICAL CENTER 6 | Facility: SURGICAL CENTER | Age: 69
End: 2024-01-08

## 2024-01-08 DIAGNOSIS — H25.811: ICD-10-CM

## 2024-01-08 PROCEDURE — 68841 INSJ RX ELUT IMPLT LAC CANAL: CPT | Mod: 79,RT

## 2024-01-08 PROCEDURE — 66984 XCAPSL CTRC RMVL W/O ECP: CPT | Mod: 79,RT

## 2024-01-09 ENCOUNTER — 1 DAY POST-OP (OUTPATIENT)
Dept: URBAN - METROPOLITAN AREA CLINIC 6 | Facility: CLINIC | Age: 69
End: 2024-01-09

## 2024-01-09 DIAGNOSIS — Z96.1: ICD-10-CM

## 2024-01-09 PROCEDURE — 99024 POSTOP FOLLOW-UP VISIT: CPT

## 2024-01-09 ASSESSMENT — TONOMETRY
OD_IOP_MMHG: 20
OS_IOP_MMHG: 19

## 2024-01-09 ASSESSMENT — KERATOMETRY
OS_K2POWER_DIOPTERS: 44.00
OD_K2POWER_DIOPTERS: 43.75
OS_AXISANGLE_DEGREES: 069
OD_AXISANGLE_DEGREES: 90
OS_AXISANGLE2_DEGREES: 159
OS_K2POWER_DIOPTERS: 44.00
OD_K1POWER_DIOPTERS: 43.25
OD_AXISANGLE_DEGREES: 90
OS_AXISANGLE_DEGREES: 069
OD_AXISANGLE2_DEGREES: 180
OS_K1POWER_DIOPTERS: 43.25
OS_AXISANGLE2_DEGREES: 159
OS_K1POWER_DIOPTERS: 43.25
OD_AXISANGLE2_DEGREES: 180
OD_K2POWER_DIOPTERS: 43.75
OD_K1POWER_DIOPTERS: 43.25

## 2024-01-09 ASSESSMENT — VISUAL ACUITY
OS_SC: 20/30
OD_SC: 20/30

## 2024-01-17 ENCOUNTER — 1 WEEK POST-OP (OUTPATIENT)
Dept: URBAN - METROPOLITAN AREA CLINIC 6 | Facility: CLINIC | Age: 69
End: 2024-01-17

## 2024-01-17 DIAGNOSIS — Z96.1: ICD-10-CM

## 2024-01-17 PROCEDURE — 99024 POSTOP FOLLOW-UP VISIT: CPT

## 2024-01-17 ASSESSMENT — TONOMETRY
OS_IOP_MMHG: 19
OD_IOP_MMHG: 21

## 2024-01-17 ASSESSMENT — KERATOMETRY
OS_K2POWER_DIOPTERS: 44.00
OS_K1POWER_DIOPTERS: 43.25
OS_AXISANGLE2_DEGREES: 159
OD_AXISANGLE2_DEGREES: 180
OD_AXISANGLE_DEGREES: 90
OD_K1POWER_DIOPTERS: 43.25
OS_AXISANGLE_DEGREES: 069
OD_K2POWER_DIOPTERS: 43.75

## 2024-01-17 ASSESSMENT — VISUAL ACUITY: OD_SC: 20/30-1

## 2024-02-06 ENCOUNTER — POST-OP CHECK (OUTPATIENT)
Dept: URBAN - METROPOLITAN AREA CLINIC 6 | Facility: CLINIC | Age: 69
End: 2024-02-06

## 2024-02-06 DIAGNOSIS — Z96.1: ICD-10-CM

## 2024-02-06 DIAGNOSIS — H52.203: ICD-10-CM

## 2024-02-06 PROCEDURE — 92015 DETERMINE REFRACTIVE STATE: CPT

## 2024-02-06 PROCEDURE — 99024 POSTOP FOLLOW-UP VISIT: CPT

## 2024-02-06 ASSESSMENT — VISUAL ACUITY
OS_PH: 20/20
OD_SC: 20/30+1
OS_SC: 20/25-1
OU_SC: J2
OD_PH: 20/20-1

## 2024-02-06 ASSESSMENT — KERATOMETRY
OS_K1POWER_DIOPTERS: 43.25
OS_AXISANGLE2_DEGREES: 159
OD_AXISANGLE_DEGREES: 90
OS_K2POWER_DIOPTERS: 44.00
OD_K1POWER_DIOPTERS: 43.25
OD_AXISANGLE2_DEGREES: 180
OD_K2POWER_DIOPTERS: 43.75
OS_AXISANGLE_DEGREES: 069

## 2024-02-06 ASSESSMENT — TONOMETRY
OS_IOP_MMHG: 14
OD_IOP_MMHG: 18

## 2024-03-15 ENCOUNTER — TELEPHONE (OUTPATIENT)
Dept: FAMILY MEDICINE CLINIC | Facility: CLINIC | Age: 69
End: 2024-03-15

## 2024-03-15 DIAGNOSIS — F41.9 ANXIETY: ICD-10-CM

## 2024-03-15 RX ORDER — ALPRAZOLAM 0.5 MG/1
0.5 TABLET ORAL 2 TIMES DAILY PRN
Qty: 20 TABLET | Refills: 0 | Status: SHIPPED | OUTPATIENT
Start: 2024-03-15

## 2024-03-15 NOTE — TELEPHONE ENCOUNTER
Patient is going to fly to attend a  and is asking or anxiety med to assist her - she states she has gotten something before and would like it again.   Please advise

## 2024-05-30 DIAGNOSIS — E03.9 HYPOTHYROIDISM, UNSPECIFIED TYPE: ICD-10-CM

## 2024-05-30 RX ORDER — LEVOTHYROXINE SODIUM 0.07 MG/1
75 TABLET ORAL DAILY
Qty: 90 TABLET | Refills: 1 | Status: SHIPPED | OUTPATIENT
Start: 2024-05-30

## 2024-06-06 ENCOUNTER — TELEPHONE (OUTPATIENT)
Dept: FAMILY MEDICINE CLINIC | Facility: CLINIC | Age: 69
End: 2024-06-06

## 2024-06-06 DIAGNOSIS — U09.9 COVID-19 LONG HAULER: ICD-10-CM

## 2024-06-06 DIAGNOSIS — E03.9 ACQUIRED HYPOTHYROIDISM: Primary | ICD-10-CM

## 2024-06-10 ENCOUNTER — HOSPITAL ENCOUNTER (OUTPATIENT)
Dept: MAMMOGRAPHY | Facility: CLINIC | Age: 69
Discharge: HOME | End: 2024-06-10
Payer: MEDICARE

## 2024-06-10 DIAGNOSIS — Z12.31 SCREENING MAMMOGRAM FOR BREAST CANCER: ICD-10-CM

## 2024-06-10 PROCEDURE — 77067 SCR MAMMO BI INCL CAD: CPT

## 2024-06-10 PROCEDURE — 77063 BREAST TOMOSYNTHESIS BI: CPT

## 2024-06-24 DIAGNOSIS — F41.9 ANXIETY: ICD-10-CM

## 2024-06-25 RX ORDER — ALPRAZOLAM 0.5 MG/1
0.5 TABLET ORAL 2 TIMES DAILY PRN
Qty: 20 TABLET | Refills: 0 | Status: SHIPPED | OUTPATIENT
Start: 2024-06-25

## 2024-07-11 ENCOUNTER — OFFICE VISIT (OUTPATIENT)
Dept: FAMILY MEDICINE CLINIC | Facility: CLINIC | Age: 69
End: 2024-07-11
Payer: MEDICARE

## 2024-07-11 VITALS
OXYGEN SATURATION: 98 % | HEIGHT: 65 IN | HEART RATE: 71 BPM | SYSTOLIC BLOOD PRESSURE: 122 MMHG | WEIGHT: 194 LBS | DIASTOLIC BLOOD PRESSURE: 76 MMHG | BODY MASS INDEX: 32.32 KG/M2

## 2024-07-11 DIAGNOSIS — E78.2 HYPERLIPIDEMIA, MIXED: ICD-10-CM

## 2024-07-11 DIAGNOSIS — E03.9 ACQUIRED HYPOTHYROIDISM: ICD-10-CM

## 2024-07-11 DIAGNOSIS — Z00.00 WELCOME TO MEDICARE PREVENTIVE VISIT: Primary | ICD-10-CM

## 2024-07-11 DIAGNOSIS — E66.09 CLASS 1 OBESITY DUE TO EXCESS CALORIES WITH SERIOUS COMORBIDITY AND BODY MASS INDEX (BMI) OF 32.0 TO 32.9 IN ADULT: ICD-10-CM

## 2024-07-11 DIAGNOSIS — F33.9 DEPRESSION, RECURRENT (HCC): ICD-10-CM

## 2024-07-11 DIAGNOSIS — Z13.1 ENCOUNTER FOR SCREENING FOR DIABETES MELLITUS: ICD-10-CM

## 2024-07-11 DIAGNOSIS — R94.31 ABNORMAL ECG: ICD-10-CM

## 2024-07-11 PROCEDURE — 99213 OFFICE O/P EST LOW 20 MIN: CPT | Performed by: PHYSICIAN ASSISTANT

## 2024-07-11 PROCEDURE — G0438 PPPS, INITIAL VISIT: HCPCS | Performed by: PHYSICIAN ASSISTANT

## 2024-07-11 PROCEDURE — 93000 ELECTROCARDIOGRAM COMPLETE: CPT | Performed by: PHYSICIAN ASSISTANT

## 2024-07-11 NOTE — PATIENT INSTRUCTIONS
Medicare Preventive Visit Patient Instructions  Thank you for completing your Welcome to Medicare Visit or Medicare Annual Wellness Visit today. Your next wellness visit will be due in one year (7/12/2025).  The screening/preventive services that you may require over the next 5-10 years are detailed below. Some tests may not apply to you based off risk factors and/or age. Screening tests ordered at today's visit but not completed yet may show as past due. Also, please note that scanned in results may not display below.  Preventive Screenings:  Service Recommendations Previous Testing/Comments   Colorectal Cancer Screening  * Colonoscopy    * Fecal Occult Blood Test (FOBT)/Fecal Immunochemical Test (FIT)  * Fecal DNA/Cologuard Test  * Flexible Sigmoidoscopy Age: 45-75 years old   Colonoscopy: every 10 years (may be performed more frequently if at higher risk)  OR  FOBT/FIT: every 1 year  OR  Cologuard: every 3 years  OR  Sigmoidoscopy: every 5 years  Screening may be recommended earlier than age 45 if at higher risk for colorectal cancer. Also, an individualized decision between you and your healthcare provider will decide whether screening between the ages of 76-85 would be appropriate. Colonoscopy: 12/05/2019  FOBT/FIT: Not on file  Cologuard: Not on file  Sigmoidoscopy: Not on file    Screening Current     Breast Cancer Screening Age: 40+ years old  Frequency: every 1-2 years  Not required if history of left and right mastectomy Mammogram: 06/10/2024    Screening Current   Cervical Cancer Screening Between the ages of 21-29, pap smear recommended once every 3 years.   Between the ages of 30-65, can perform pap smear with HPV co-testing every 5 years.   Recommendations may differ for women with a history of total hysterectomy, cervical cancer, or abnormal pap smears in past. Pap Smear: 09/01/2023    Screening Not Indicated   Hepatitis C Screening Once for adults born between 1945 and 1965  More frequently in  patients at high risk for Hepatitis C Hep C Antibody: 11/07/2019    Screening Current   Diabetes Screening 1-2 times per year if you're at risk for diabetes or have pre-diabetes Fasting glucose: 124 mg/dL (11/28/2023)  A1C: No results in last 5 years (No results in last 5 years)  Screening Current   Cholesterol Screening Once every 5 years if you don't have a lipid disorder. May order more often based on risk factors. Lipid panel: 11/28/2023    Screening Not Indicated  History Lipid Disorder     Other Preventive Screenings Covered by Medicare:  Abdominal Aortic Aneurysm (AAA) Screening: covered once if your at risk. You're considered to be at risk if you have a family history of AAA.  Lung Cancer Screening: covers low dose CT scan once per year if you meet all of the following conditions: (1) Age 55-77; (2) No signs or symptoms of lung cancer; (3) Current smoker or have quit smoking within the last 15 years; (4) You have a tobacco smoking history of at least 20 pack years (packs per day multiplied by number of years you smoked); (5) You get a written order from a healthcare provider.  Glaucoma Screening: covered annually if you're considered high risk: (1) You have diabetes OR (2) Family history of glaucoma OR (3)  aged 50 and older OR (4)  American aged 65 and older  Osteoporosis Screening: covered every 2 years if you meet one of the following conditions: (1) You're estrogen deficient and at risk for osteoporosis based off medical history and other findings; (2) Have a vertebral abnormality; (3) On glucocorticoid therapy for more than 3 months; (4) Have primary hyperparathyroidism; (5) On osteoporosis medications and need to assess response to drug therapy.   Last bone density test (DXA Scan): 12/05/2023.  HIV Screening: covered annually if you're between the age of 15-65. Also covered annually if you are younger than 15 and older than 65 with risk factors for HIV infection. For pregnant  patients, it is covered up to 3 times per pregnancy.    Immunizations:  Immunization Recommendations   Influenza Vaccine Annual influenza vaccination during flu season is recommended for all persons aged >= 6 months who do not have contraindications   Pneumococcal Vaccine   * Pneumococcal conjugate vaccine = PCV13 (Prevnar 13), PCV15 (Vaxneuvance), PCV20 (Prevnar 20)  * Pneumococcal polysaccharide vaccine = PPSV23 (Pneumovax) Adults 19-63 yo with certain risk factors or if 65+ yo  If never received any pneumonia vaccine: recommend Prevnar 20 (PCV20)  Give PCV20 if previously received 1 dose of PCV13 or PPSV23   Hepatitis B Vaccine 3 dose series if at intermediate or high risk (ex: diabetes, end stage renal disease, liver disease)   Respiratory syncytial virus (RSV) Vaccine - COVERED BY MEDICARE PART D  * RSVPreF3 (Arexvy) CDC recommends that adults 60 years of age and older may receive a single dose of RSV vaccine using shared clinical decision-making (SCDM)   Tetanus (Td) Vaccine - COST NOT COVERED BY MEDICARE PART B Following completion of primary series, a booster dose should be given every 10 years to maintain immunity against tetanus. Td may also be given as tetanus wound prophylaxis.   Tdap Vaccine - COST NOT COVERED BY MEDICARE PART B Recommended at least once for all adults. For pregnant patients, recommended with each pregnancy.   Shingles Vaccine (Shingrix) - COST NOT COVERED BY MEDICARE PART B  2 shot series recommended in those 19 years and older who have or will have weakened immune systems or those 50 years and older     Health Maintenance Due:      Topic Date Due   • Colorectal Cancer Screening  12/05/2024   • Breast Cancer Screening: Mammogram  06/10/2025   • Hepatitis C Screening  Completed     Immunizations Due:      Topic Date Due   • Pneumococcal Vaccine: 65+ Years (1 of 1 - PCV) Never done   • Influenza Vaccine (1) 09/01/2024     Advance Directives   What are advance directives?  Advance  directives are legal documents that state your wishes and plans for medical care. These plans are made ahead of time in case you lose your ability to make decisions for yourself. Advance directives can apply to any medical decision, such as the treatments you want, and if you want to donate organs.   What are the types of advance directives?  There are many types of advance directives, and each state has rules about how to use them. You may choose a combination of any of the following:  Living will:  This is a written record of the treatment you want. You can also choose which treatments you do not want, which to limit, and which to stop at a certain time. This includes surgery, medicine, IV fluid, and tube feedings.   Durable power of  for healthcare (DPAHC):  This is a written record that states who you want to make healthcare choices for you when you are unable to make them for yourself. This person, called a proxy, is usually a family member or a friend. You may choose more than 1 proxy.  Do not resuscitate (DNR) order:  A DNR order is used in case your heart stops beating or you stop breathing. It is a request not to have certain forms of treatment, such as CPR. A DNR order may be included in other types of advance directives.  Medical directive:  This covers the care that you want if you are in a coma, near death, or unable to make decisions for yourself. You can list the treatments you want for each condition. Treatment may include pain medicine, surgery, blood transfusions, dialysis, IV or tube feedings, and a ventilator (breathing machine).  Values history:  This document has questions about your views, beliefs, and how you feel and think about life. This information can help others choose the care that you would choose.  Why are advance directives important?  An advance directive helps you control your care. Although spoken wishes may be used, it is better to have your wishes written down. Spoken  wishes can be misunderstood, or not followed. Treatments may be given even if you do not want them. An advance directive may make it easier for your family to make difficult choices about your care.   Urinary Incontinence   Urinary incontinence (UI)  is when you lose control of your bladder. UI develops because your bladder cannot store or empty urine properly. The 3 most common types of UI are stress incontinence, urge incontinence, or both.  Medicines:   May be given to help strengthen your bladder control. Report any side effects of medication to your healthcare provider.  Do pelvic muscle exercises often:  Your pelvic muscles help you stop urinating. Squeeze these muscles tight for 5 seconds, then relax for 5 seconds. Gradually work up to squeezing for 10 seconds. Do 3 sets of 15 repetitions a day, or as directed. This will help strengthen your pelvic muscles and improve bladder control.  Train your bladder:  Go to the bathroom at set times, such as every 2 hours, even if you do not feel the urge to go. You can also try to hold your urine when you feel the urge to go. For example, hold your urine for 5 minutes when you feel the urge to go. As that becomes easier, hold your urine for 10 minutes.   Self-care:   Keep a UI record.  Write down how often you leak urine and how much you leak. Make a note of what you were doing when you leaked urine.  Drink liquids as directed. You may need to limit the amount of liquid you drink to help control your urine leakage. Do not drink any liquid right before you go to bed. Limit or do not have drinks that contain caffeine or alcohol.   Prevent constipation.  Eat a variety of high-fiber foods. Good examples are high-fiber cereals, beans, vegetables, and whole-grain breads. Walking is the best way to trigger your intestines to have a bowel movement.  Exercise regularly and maintain a healthy weight.  Weight loss and exercise will decrease pressure on your bladder and help you  control your leakage.   Use a catheter as directed  to help empty your bladder. A catheter is a tiny, plastic tube that is put into your bladder to drain your urine.   Go to behavior therapy as directed.  Behavior therapy may be used to help you learn to control your urge to urinate.    Weight Management   Why it is important to manage your weight:  Being overweight increases your risk of health conditions such as heart disease, high blood pressure, type 2 diabetes, and certain types of cancer. It can also increase your risk for osteoarthritis, sleep apnea, and other respiratory problems. Aim for a slow, steady weight loss. Even a small amount of weight loss can lower your risk of health problems.  How to lose weight safely:  A safe and healthy way to lose weight is to eat fewer calories and get regular exercise. You can lose up about 1 pound a week by decreasing the number of calories you eat by 500 calories each day.   Healthy meal plan for weight management:  A healthy meal plan includes a variety of foods, contains fewer calories, and helps you stay healthy. A healthy meal plan includes the following:  Eat whole-grain foods more often.  A healthy meal plan should contain fiber. Fiber is the part of grains, fruits, and vegetables that is not broken down by your body. Whole-grain foods are healthy and provide extra fiber in your diet. Some examples of whole-grain foods are whole-wheat breads and pastas, oatmeal, brown rice, and bulgur.  Eat a variety of vegetables every day.  Include dark, leafy greens such as spinach, kale, timmy greens, and mustard greens. Eat yellow and orange vegetables such as carrots, sweet potatoes, and winter squash.   Eat a variety of fruits every day.  Choose fresh or canned fruit (canned in its own juice or light syrup) instead of juice. Fruit juice has very little or no fiber.  Eat low-fat dairy foods.  Drink fat-free (skim) milk or 1% milk. Eat fat-free yogurt and low-fat cottage  cheese. Try low-fat cheeses such as mozzarella and other reduced-fat cheeses.  Choose meat and other protein foods that are low in fat.  Choose beans or other legumes such as split peas or lentils. Choose fish, skinless poultry (chicken or turkey), or lean cuts of red meat (beef or pork). Before you cook meat or poultry, cut off any visible fat.   Use less fat and oil.  Try baking foods instead of frying them. Add less fat, such as margarine, sour cream, regular salad dressing and mayonnaise to foods. Eat fewer high-fat foods. Some examples of high-fat foods include french fries, doughnuts, ice cream, and cakes.  Eat fewer sweets.  Limit foods and drinks that are high in sugar. This includes candy, cookies, regular soda, and sweetened drinks.  Exercise:  Exercise at least 30 minutes per day on most days of the week. Some examples of exercise include walking, biking, dancing, and swimming. You can also fit in more physical activity by taking the stairs instead of the elevator or parking farther away from stores. Ask your healthcare provider about the best exercise plan for you.      © Copyright [employer] 2018 Information is for End User's use only and may not be sold, redistributed or otherwise used for commercial purposes. All illustrations and images included in CareNotes® are the copyrighted property of [employer] or [employer]

## 2024-07-11 NOTE — PROGRESS NOTES
Ambulatory Visit  Name: Claudia Fulton      : 1955      MRN: [id number]  Encounter Provider: Maida Hitchcock PA-C  Encounter Date: 2024   Encounter department: [place] [id number] [address]    Assessment & Plan   1. Welcome to Medicare preventive visit  -     POCT ECG  2. Depression, recurrent (HCC)  3. Hyperlipidemia, mixed  -     Comprehensive metabolic panel; Future  -     Lipid Panel with Direct LDL reflex; Future  -     Hemoglobin A1C; Future  4. Acquired hypothyroidism  -     Comprehensive metabolic panel; Future  -     Lipid Panel with Direct LDL reflex; Future  -     Hemoglobin A1C; Future  5. Abnormal ECG  -     Ambulatory Referral to Cardiology; Future  6. Class 1 obesity due to excess calories with serious comorbidity and body mass index (BMI) of 32.0 to 32.9 in adult  -     Comprehensive metabolic panel; Future  -     Lipid Panel with Direct LDL reflex; Future  -     Hemoglobin A1C; Future  -     Ambulatory Referral to Weight Management; Future  7. Encounter for screening for diabetes mellitus  -     Hemoglobin A1C; Future  Depression Screening Follow-up Plan: Patient's depression screening was positive with a PHQ-2 score of . Their PHQ-9 score was 21. Patient assessed for underlying major depression. They have no active suicidal ideations. Brief counseling provided and recommend additional follow-up/re-evaluation next office visit. Continue regular follow-up with their psychologist/therapist/psychiatrist who is managing their mental health condition(s).     Depression Screening and Follow-up Plan: Patient's depression screening was positive with a PHQ-9 score of 21.     Urinary Incontinence Plan of Care: counseling topics discussed: practice Kegel (pelvic floor strengthening) exercises, use restroom every 2 hours and limit alcohol, caffeine, spicy foods, and acidic foods.     Preventive health issues were discussed with patient, and age appropriate  screening tests were ordered as noted in patient's After Visit Summary. Personalized health advice and appropriate referrals for health education or preventive services given if needed, as noted in patient's After Visit Summary.    History of Present Illness     Pt is here for her Welcome to Medicare visit. She is having difficulty losing weight. She exercises with walking or riding her bike daily. She is eating a healthy diet. Pt is following with psychiatry for her depression as well as a therapist.      Patient Care Team:  Maida Hitchcock PA-C as PCP - General (Family Medicine)  Can Davis MD as Endoscopist    Review of Systems   Constitutional:  Negative for appetite change, fatigue, fever and unexpected weight change.   HENT:  Negative for dental problem, ear pain, hearing loss, mouth sores, nosebleeds, rhinorrhea, tinnitus, trouble swallowing and voice change.    Eyes:  Negative for photophobia, pain, discharge and visual disturbance.   Respiratory:  Negative for cough, chest tightness, shortness of breath and wheezing.    Cardiovascular:  Negative for chest pain and palpitations.   Gastrointestinal:  Negative for abdominal pain, blood in stool, constipation, diarrhea, nausea, rectal pain and vomiting.   Endocrine: Negative for cold intolerance, polydipsia, polyphagia and polyuria.   Genitourinary:  Negative for decreased urine volume, difficulty urinating, dysuria, enuresis, frequency, genital sores, hematuria and urgency.   Musculoskeletal:  Negative for arthralgias, back pain, gait problem, joint swelling, myalgias, neck pain and neck stiffness.   Skin:  Negative for color change and rash.   Allergic/Immunologic: Negative for environmental allergies, food allergies and immunocompromised state.   Neurological:  Negative for dizziness, seizures, speech difficulty, light-headedness and headaches.   Hematological:  Negative for adenopathy. Does not bruise/bleed easily.   Psychiatric/Behavioral:   Negative for behavioral problems, confusion, decreased concentration, dysphoric mood, hallucinations, self-injury, sleep disturbance and suicidal ideas. The patient is not nervous/anxious and is not hyperactive.      Medical History Reviewed by provider this encounter:  Tobacco  Allergies  Meds  Problems  Med Hx  Surg Hx  Fam Hx       Annual Wellness Visit Questionnaire   Claudia is here for her Welcome to Medicare visit.     Health Risk Assessment:   Patient rates overall health as fair. Patient feels that their physical health rating is same. Patient is dissatisfied with their life. Eyesight was rated as slightly worse. Hearing was rated as same. Patient feels that their emotional and mental health rating is slightly worse. Patients states they are sometimes angry. Patient states they are always unusually tired/fatigued. Pain experienced in the last 7 days has been none. Patient states that she has experienced weight loss or gain in last 6 months. Long haul covid maybe a contributing factor.    Depression Screening:   PHQ-9 Score: 21      Fall Risk Screening:   In the past year, patient has experienced: no history of falling in past year      Urinary Incontinence Screening:   Patient has leaked urine accidently in the last six months. Sometimes when sneezing.    Home Safety:  Patient does not have trouble with stairs inside or outside of their home. Patient has working smoke alarms and has no working carbon monoxide detector. Home safety hazards include: none.     Nutrition:   Current diet is Regular. I am morbifly obede accorfing yo BMI ingi online. Eating healthy home prepared diet but efforts to lose weight have not produced tesultd ,    Medications:   Patient is currently taking over-the-counter supplements. OTC medications include: see medication list. Patient is able to manage medications.     Activities of Daily Living (ADLs)/Instrumental Activities of Daily Living (IADLs):   Walk and transfer into  and out of bed and chair?: Yes  Dress and groom yourself?: Yes    Bathe or shower yourself?: Yes    Feed yourself? Yes  Do your laundry/housekeeping?: Yes  Manage your money, pay your bills and track your expenses?: Yes  Make your own meals?: Yes    Do your own shopping?: Yes    Previous Hospitalizations:   Any hospitalizations or ED visits within the last 12 months?: No      Advance Care Planning:   Living will: No    Durable POA for healthcare: No    Advanced directive: No      PREVENTIVE SCREENINGS      Cardiovascular Screening:    General: Screening Not Indicated and History Lipid Disorder      Diabetes Screening:     General: Screening Current      Colorectal Cancer Screening:     General: Screening Current      Breast Cancer Screening:     General: Screening Current      Cervical Cancer Screening:    General: Screening Not Indicated      Lung Cancer Screening:     General: Screening Not Indicated      Hepatitis C Screening:    General: Screening Current    Screening, Brief Intervention, and Referral to Treatment (SBIRT)    Screening  Typical number of drinks in a day: 0  Typical number of drinks in a week: 0  Interpretation: Low risk drinking behavior.    AUDIT-C Screenin) How often did you have a drink containing alcohol in the past year? never  2) How many drinks did you have on a typical day when you were drinking in the past year? 0  3) How often did you have 6 or more drinks on one occasion in the past year? never    AUDIT-C Score: 0  Interpretation: Score 0-2 (female): Negative screen for alcohol misuse    Single Item Drug Screening:  How often have you used an illegal drug (including marijuana) or a prescription medication for non-medical reasons in the past year? never    Single Item Drug Screen Score: 0  Interpretation: Negative screen for possible drug use disorder    Social Determinants of Health     Financial Resource Strain: Low Risk  (2023)    Overall Financial Resource Strain ([facility])  "   • Difficulty of Paying Living Expenses: Not hard at all   Food Insecurity: No Food Insecurity (7/10/2024)    Hunger Vital Sign    • Worried About Running Out of Food in the Last Year: Never true    • Ran Out of Food in the Last Year: Never true   Transportation Needs: No Transportation Needs (7/10/2024)    PRAPARE - Transportation    • Lack of Transportation (Medical): No    • Lack of Transportation (Non-Medical): No   Housing Stability: Low Risk  (7/10/2024)    Housing Stability Vital Sign    • Unable to Pay for Housing in the Last Year: No    • Number of Times Moved in the Last Year: 0    • Homeless in the Last Year: No   Utilities: Not At Risk (7/10/2024)    [facility] Utilities    • Threatened with loss of utilities: No     Vision Screening    Right eye Left eye Both eyes   Without correction 20/20 20/20 20/20   With correction          Objective     /76   Pulse 71   Ht 5' 5\" (1.651 m)   Wt 88 kg (194 lb)   SpO2 98%   BMI 32.28 kg/m²     Physical Exam  Vitals and nursing note reviewed.   Constitutional:       Appearance: Normal appearance. She is normal weight.   HENT:      Head: Normocephalic and atraumatic.      Right Ear: Tympanic membrane, ear canal and external ear normal.      Left Ear: Tympanic membrane, ear canal and external ear normal.      Nose: Nose normal.      Mouth/Throat:      Mouth: Mucous membranes are moist.      Pharynx: Oropharynx is clear.   Eyes:      Extraocular Movements: Extraocular movements intact.      Conjunctiva/sclera: Conjunctivae normal.   Neck:      Thyroid: No thyromegaly.      Vascular: No carotid bruit.   Cardiovascular:      Rate and Rhythm: Normal rate and regular rhythm.      Pulses: Normal pulses.      Heart sounds: Normal heart sounds.   Pulmonary:      Effort: Pulmonary effort is normal.      Breath sounds: Normal breath sounds.   Abdominal:      General: Abdomen is flat. Bowel sounds are normal.      Palpations: Abdomen is soft. There is no hepatomegaly, " splenomegaly or mass.      Tenderness: There is no abdominal tenderness. There is no right CVA tenderness or left CVA tenderness.   Musculoskeletal:         General: Normal range of motion.      Cervical back: Normal range of motion and neck supple.      Right lower leg: No edema.      Left lower leg: No edema.   Lymphadenopathy:      Cervical: No cervical adenopathy.   Skin:     General: Skin is warm and dry.   Neurological:      General: No focal deficit present.      Mental Status: She is alert and oriented to person, place, and time.   Psychiatric:         Mood and Affect: Mood normal.         Behavior: Behavior normal.         Thought Content: Thought content normal.         Judgment: Judgment normal.

## 2024-08-05 DIAGNOSIS — E78.2 HYPERLIPIDEMIA, MIXED: ICD-10-CM

## 2024-08-05 RX ORDER — ROSUVASTATIN CALCIUM 5 MG/1
5 TABLET, COATED ORAL DAILY
Qty: 100 TABLET | Refills: 1 | Status: SHIPPED | OUTPATIENT
Start: 2024-08-05

## 2024-08-10 PROBLEM — Z00.00 WELCOME TO MEDICARE PREVENTIVE VISIT: Status: RESOLVED | Noted: 2023-06-01 | Resolved: 2024-08-10

## 2024-10-30 ENCOUNTER — TELEPHONE (OUTPATIENT)
Dept: GASTROENTEROLOGY | Facility: CLINIC | Age: 69
End: 2024-10-30

## 2024-10-30 NOTE — LETTER
Hello,     Attached are your prep instructions for your upcoming Colonoscopy scheduled on 1/13/25 with Dr. Reyes The GI lab will be calling the day before between 2pm and 6pm with your arrival time for your procedure. If you have any questions, please feel free to contact our office at [phone].     Address to our location:    [facility], [address], [place], [address]       Thank you for choosing [facility]!                                                                               COLONOSCOPY  MIRALAX/Dulcolax Bowel Preparation Instructions    The OR/GI Lab will contact you the evening prior to your procedure with your exact arrival time.    Our practice requires a 1 week notice for any cancellations or rescheduling. We kindly ask that you immediately notify us of any changes including any new medications that are prescribed. Thank you for your cooperation.     WEEK BEFORE YOUR PROCEDURE:  Stop taking Iron tablets.  5 days prior, AVOID vegetables and fruits with skins or seeds, nuts, corn, popcorn and whole grain breads.   Purchase: One (1) 238-gram container of Miralax (polyethylene glycol 3350), four (4) 5 mg Dulcolax (bisacodyl) tablets, and one (1) 64-ounce bottle of Gatorade (sports drink) - no red, orange, or purple. These may be purchased at any pharmacy without a prescription. Generic products are permissible.   Arrange responsible transportation for day of the procedure.     DAY BEFORE THE PROCEDURE:   CLEAR liquids only for entire day prior. Nothing red, orange or purple.    You MAY have:                                                               Soda  Water  Broth Gatorade  Jello  Popsicles Coffee/tea without milk/creamer     YOU MAY NOT HAVE:  Solid foods   Milk and milk products    Juice with pulp    BOWEL PREPARATION:  Includes: One (1) 238-gram container of Miralax (polyethylene glycol 3350),  four (4) 5 mg Dulcolax (bisacodyl) tablets, and one (1) 64-ounce bottle of Gatorade (sports drink).  Preparation may be refrigerated.  Entire bowel prep should be completed.     Afternoon before the procedure (2:00 pm - 5:00 pm):    Take two (2) 5 mg Dulcolax laxative tablets.     Evening before the procedure (6:00 pm):  Mix entire container of Miralax with one (1) 64-ounce bottle of Gatorade and shake until all medication is dissolved.   Begin drinking solution. Drink an eight (8) ounce cup every 10-15 minutes until you have consumed half (32 ounces) of the solution.  Refrigerate remaining solution.    Night before the procedure (8:00 pm):  Take two (2) 5 mg Dulcolax laxative tablets.     Beginning 5 hours before your procedure:  Drink the remaining amount of prepared solution (32 ounces).  Drink an eight (8) ounce cup every 10-15 minutes until you have consumed the remaining solution.     Bowel prep should be completed 4 hours prior to procedure time.    NOTHING TO EAT OR DRINK AFTER MIDNIGHT- EXCEPT FOR YOUR PREP    DAY OF THE PROCEDURE:  You may brush your teeth.  Leave all jewelry at home.  Please arrive for your procedure as indicated by the OR / GI Lab / Endoscopy Unit. The hospital will contact you the day before with your exact arrival time.   Make sure you have arranged ahead of time for a responsible adult (18 or older) to accompany and drive you home after the procedure.  Please discuss any transportation concerns with our staff prior to your procedure.    The effects of the anesthesia can persist for 24 hours.  After receiving the sedation, you must exercise caution before engaging in any activity that could harm yourself and others (such as driving a car).  Do not make any important decisions or do not drink any alcoholic beverages during this time period.  After your procedure, you may have anything you'd like to eat or drink.  You will probably want to start with something light.  Please include  plenty of fluids.  Avoid items that cause gas such as sodas and salads.    SPECIAL INSTRUCTIONS:    For patients currently taking blood thinners and/or antiplatelet therapy our office will contact the prescribing provider.  Our office will contact you with any required changes to your medication regimen.     Blood thinner (i.e. - Coumadin, Pradaxa, Lovenox, Xarelto, Eliquis)  ?  Continue (Do Not Stop)  ? Stop______________for_____________days prior to the procedure.    Antiplatelet (i.e. - Plavix, Aggrenox, Effient, Brilinta)  ?  Continue (Do Not Stop)  ? Stop______________for_____________days prior to the procedure.       Diabetes:   If you are Diabetic, please see separate Diabetic Instruction Sheet.          Prescribed medications:  Do not stop your aspirin, or any of your other medications (unless instructed otherwise).    Take the rest of your prescribed medications with small sips of water at least 2 hours prior to your procedure.                        Medicine Instructions for Adults with Diabetes who Need a Bowel Prep       Follow these instructions when a BOWEL PREP is required for your procedure or surgery!    NOTE:   GLP-1 Agonists taken weekly: do not take in the 7 days before your procedure   SGLT-2 Inhibitors: do not take in the 4 days before your procedure     On the Day Before Surgery/Procedure  If you are having a procedure (e.g. Colonoscopy) or surgery that requires a bowel prep and you may have at least a clear liquid diet, follow the directions below based on the type of medicine you take for your diabetes.     Type of Medicine You Take Examples What to do   Pre-Mixed Insulin - Intermediate Acting Humalog® 75/25, Humulin® 70/30, Novolog® 70/30, Regular Insulin Take ½ your regular dose the evening before your procedure   Rapid/Fast Acting Insulin Humalog® U200, NovoLog®, Apidra®, Fiasp® Take ½ your regular dose the evening before your procedure.   Long-Acting Insulin Lantus®, Levemir®, Tresiba®,  Toujeo®, Basaglar® Take your FULL regular dose the day before procedure   Oral Sulfonylurea Glipizide/Glimepiride/Glucotrol® Take ½ your regular dose the evening before your procedure   Other Oral Diabetes Medicines Metformin®, Glucophage®, Glucophage XR®, Riomet®, Glumetza®), Actose®, Avandia®, Glyset®, Prandin® Take your regular dose with dinner in the evening before your procedure   GLP-1 Agonists AdlyxinÒ, ByettaÒ, BydureonÒ, OzempicÒ, SoliquaÒ, TanzeumÒ, TrulicityÒ, VictozaÒ, Saxenda®, Rybelsus® If taken daily, take as normal    If taken weekly, do not take this medicine for 7 days before your procedure including the day of the procedure (resume taking after the procedure)   SGLT-2 Inhibitors Jardiance®, Invokana®, Farxiga®,   Steglatro®, Brenzavvy®, Qtern®, Segluromet®, Glyxambi®, Synjardy®, Synjardy XR®, Invokamet®, Invokamet XR®, Trijary XR®, Xigduo XR®, Steglujan® Do not take for 4 days before your procedure including the day of the procedure (resume taking after the procedure)                More information continued on back                    Medicine Instructions for Adults with Diabetes who Need a Bowel Prep  Page 2      On the Day of Surgery/Procedure  Follow the directions below based on the type of medicine you take for your diabetes.     Type of Medicine You Take Examples What to do   Long-Acting Insulin Lantus®, Levemir®, Tresiba®, Toujeo®, Basaglar®, Semglee®   If you usually take your Long-Acting Insulin in the morning, take the full dose as scheduled.   GLP-1 Agonists AdlyxinÒ, ByettaÒ, BydureonÒ, OzempicÒ, SoliquaÒ, TanzeumÒ, TrulicityÒ, VictozaÒ, Saxenda®, Rybelsus® Do NOT take this medicine on the day of your procedure (resume taking after the procedure)       On the Day of Surgery/Procedure (continued)  Except for the morning Long-Acting Insulin, DO NOT take ANY diabetic medicine on the day of your procedure unless you were instructed by the doctor who manages your diabetes medicines.     Continue to check your blood sugars.  If you have an insulin pump, ask your endocrinologist for instructions at least 3 days before your procedure. NOTE: If you are not able to ask your endocrinologist in advance, on the day of the procedure set your insulin pump to your basal rate only. Bring your insulin pump supplies to the hospital.     If you have any questions about taking your diabetes medicines prior to your procedure, please contact the doctor who manages your diabetes medicines.                     For any questions or concerns related to your bowel preparation or pre-procedure instructions, please contact our office at [phone].  Thank you for choosing [facility]!

## 2024-11-26 ENCOUNTER — CONSULT (OUTPATIENT)
Dept: CARDIOLOGY CLINIC | Facility: CLINIC | Age: 69
End: 2024-11-26
Payer: MEDICARE

## 2024-11-26 VITALS
DIASTOLIC BLOOD PRESSURE: 70 MMHG | HEART RATE: 70 BPM | BODY MASS INDEX: 32.32 KG/M2 | WEIGHT: 194 LBS | RESPIRATION RATE: 16 BRPM | SYSTOLIC BLOOD PRESSURE: 130 MMHG | OXYGEN SATURATION: 99 % | HEIGHT: 65 IN

## 2024-11-26 DIAGNOSIS — R94.31 ABNORMAL ECG: ICD-10-CM

## 2024-11-26 DIAGNOSIS — R06.02 SHORTNESS OF BREATH ON EXERTION: Primary | ICD-10-CM

## 2024-11-26 DIAGNOSIS — E78.2 MIXED HYPERLIPIDEMIA: ICD-10-CM

## 2024-11-26 DIAGNOSIS — E66.9 OBESITY (BMI 30-39.9): ICD-10-CM

## 2024-11-26 PROCEDURE — 99204 OFFICE O/P NEW MOD 45 MIN: CPT | Performed by: INTERNAL MEDICINE

## 2024-11-26 NOTE — PROGRESS NOTES
CARDIOLOGY INITIAL VISIT  [facility]  [address], [place] [id number]  Tel: [phone]      NAME: Claudia Fulton  AGE: 69 y.o.  SEX: female  : 1955  MRN: [id number]      Chief Complaint:  Chief Complaint   Patient presents with    New Patient Visit         History of Present Illness:   69-year-old female with GERD, dyslipidemia referred by PCP for an abnormal EKG (2024).  Patient denies chest pain , palpitations, lightheadedness, syncope, swelling feet, orthopnea, PND, claudication.  States sometimes when she climbs steps carrying her 7-month-old granddaughter she gets short of breath but no associated CP.    Mixed hyperlipidemia -  Has had hyperlipidemia for about 2 years.  Taking statin regularly along with diet control.  Denies myalgia.  PCP closely monitoring the blood work.    Obesity -  Trying to lose weight.      Past Medical History:  Past Medical History:   Diagnosis Date    Anxiety     Chronic kidney disease     renal sac- pt denied.    Depression     Disease of thyroid gland     Dysuria     LAST ASSESSED [id number]    Endometriosis     Gout     History of fractured kneecap     hairline fracture right knee due to accident          Past Surgical History:  Past Surgical History:   Procedure Laterality Date    COLONOSCOPY      INGUINAL HERNIA REPAIR      LAST ASSESSED [id number]    OOPHORECTOMY  2004    thinks right side    OTHER SURGICAL HISTORY      FACE SURGERY  -- LAST ASSESSED [id number]    OTHER SURGICAL HISTORY      stiches in chin due to accident     PA COLONOSCOPY FLX DX W/COLLJ SPEC WHEN PFRMD N/A 2018    Procedure: EGD AND COLONOSCOPY;  Surgeon: Can Davis MD;  Location: [facility];  Service: Gastroenterology         Family History:  Family History   Problem Relation Age of Onset    Atrial fibrillation Mother     Depression Mother     Dementia Mother     Alcohol abuse Father     Heart  attack Father     No Known Problems Sister     No Known Problems Daughter     No Known Problems Daughter     Other Maternal Grandmother         BLOOD DISORDER    Heart attack Maternal Grandfather     Melanoma Paternal Grandmother     Heart attack Paternal Grandfather     No Known Problems Maternal Aunt     No Known Problems Maternal Aunt     No Known Problems Paternal Aunt     Breast cancer Neg Hx          Social History:  Social History     Socioeconomic History    Marital status: /Civil Union     Spouse name: Not on file    Number of children: Not on file    Years of education: Not on file    Highest education level: Not on file   Occupational History    Not on file   Tobacco Use    Smoking status: Never    Smokeless tobacco: Never   Vaping Use    Vaping status: Never Used   Substance and Sexual Activity    Alcohol use: No    Drug use: No    Sexual activity: Yes   Other Topics Concern    Not on file   Social History Narrative    ALWAYS USES [employer]    DAILY [employer]    EMPLOYED     SEEING DENTIST      Social Drivers of Health     Financial Resource Strain: Low Risk  (6/1/2023)    Overall Financial Resource Strain (CARDIA)     Difficulty of Paying Living Expenses: Not hard at all   Food Insecurity: No Food Insecurity (7/10/2024)    Nursing - Inadequate Food Risk Classification     Worried About Running Out of Food in the Last Year: Never true     Ran Out of Food in the Last Year: Never true     Ran Out of Food in the Last Year: Not on file   Transportation Needs: No Transportation Needs (7/10/2024)    PRAPARE - Transportation     Lack of Transportation (Medical): No     Lack of Transportation (Non-Medical): No   Physical Activity: Not on file   Stress: Not on file   Social Connections: Not on file   Intimate Partner Violence: Not on file   Housing Stability: Low Risk  (7/10/2024)    Housing Stability Vital Sign     Unable to Pay for Housing in the Last Year: No     Number of Times Moved in the  Last Year: 0     Homeless in the Last Year: No         Active Problems:  Patient Active Problem List   Diagnosis    Anxiety    GERD (gastroesophageal reflux disease)    Hyperlipidemia, mixed    Lumbar radiculitis    Lumbar spondylosis    Other fatigue    Arthritis    Skin lesion    Hypothyroidism    Gallbladder colic    Depression, recurrent (HCC)    COVID-19 long hauler    Asymptomatic postmenopausal state    Other hypersomnia not due to a substance or known physiological condition    RUBEN (obstructive sleep apnea)    Age-related cataract of both eyes    Pre-op examination         The following portions of the patient's history were reviewed and updated as appropriate: past medical history, past surgical history, past family history,  past social history, current medications, allergies and problem list.      Review of Systems:  Constitutional: Denies fever, chills  Eyes: Denies eye redness, eye discharge  ENT: Denies hearing loss, sneezing, nasal discharge, sore throat   Respiratory: Denies cough, expectoration. +shortness of breath  Cardiovascular: Denies chest pain, palpitations, lower extremity swelling  Gastrointestinal: Denies abdominal pain, nausea, vomiting, diarrhea  Genito-Urinary: Denies dysuria, incontinence  Musculoskeletal: Denies muscle pain  Neurologic: Denies lightheadedness, syncope, headache, seizures  Endocrine: Denies polydipsia, temperature intolerance  Allergy and Immunology: Denies hives, insect bite sensitivity  Hematological and Lymphatic: Denies bleeding problems, swollen glands   Psychological: Denies depression, suicidal ideation, anxiety, panic  Dermatological: Denies pruritus, rash, skin lesion changes      Vitals:  Vitals:    11/26/24 0826   BP: 130/70   Pulse: 70   Resp: 16   SpO2: 99%       Body mass index is 32.28 kg/m².    Weight (last 2 days)       Date/Time Weight    11/26/24 0826 88 (194)              Physical Examination:  General: Patient is not in acute distress. Awake,  "alert, oriented in time, place and person. Responding to commands  Head: Normocephalic. Atraumatic  Eyes: Both pupils normal sized, round and reactive to light. Nonicteric  ENT: Normal external ear canals  Neck: Supple. JVP not raised. Trachea central. No thyromegaly  Lungs: Bilateral bronchovascular breath sounds with no crackles or rhonchi  Chest wall: No tenderness  Cardiovascular: RRR. S1 and S2 normal. No murmur, rub or gallop  Gastrointestinal: Abdomen soft, nontender. No guarding or rigidity. Liver and spleen not palpable. Bowel sounds present  Neurologic: Patient is awake, alert, oriented in time, place and person. Responding to commands. Moving all extremities  Integumentary:  No skin rash  Lymphatic: No cervical lymphadenopathy  Back: Symmetric. No CVA tenderness  Extremities: No clubbing, cyanosis or edema      Laboratory Results:  CBC with diff:   Lab Results   Component Value Date    WBC 5.31 11/29/2021    RBC 4.59 11/29/2021    HGB 14.5 11/29/2021    HCT 43.6 11/29/2021    MCV 95 11/29/2021    MCH 31.6 11/29/2021    RDW 13.2 11/29/2021     11/29/2021       CMP:  Lab Results   Component Value Date    CREATININE 0.77 11/28/2023    BUN 16 11/28/2023    K 3.8 11/28/2023     11/28/2023    CO2 29 11/28/2023    ALKPHOS 96 11/28/2023    ALT 14 11/28/2023    AST 18 11/28/2023         Lipid Profile:   No results found for: \"CHOL\"  Lab Results   Component Value Date    HDL 68 11/28/2023    HDL 60 11/29/2021    HDL 70 11/07/2019     Lab Results   Component Value Date    LDLCALC 62 11/28/2023    LDLCALC 160 (H) 11/29/2021    LDLCALC 175 (H) 11/07/2019     Lab Results   Component Value Date    TRIG 347 (H) 11/28/2023    TRIG 101 11/29/2021    TRIG 75 11/07/2019         EKG: Dated 7/11/24. Reviewed by me.  Sinus bradycardia.  59 bpm.  Nonspecific T wave abnormality      Medications:    Current Outpatient Medications:     busPIRone (BUSPAR) 10 mg tablet, 1 TABLET(S) 2 TIMES A DAY, ORAL ROUTE AT 8 AM AND 6 " PM, Disp: , Rfl:     cholecalciferol (VITAMIN D3) 1,000 units tablet, Take 1,000 Units by mouth daily, Disp: , Rfl:     Cyanocobalamin (VITAMIN B 12 PO), Take by mouth, Disp: , Rfl:     desvenlafaxine (PRISTIQ) 100 mg 24 hr tablet, Take 100 mg by mouth daily , Disp: , Rfl:     estradiol (ESTRACE VAGINAL) 0.1 mg/g vaginal cream, Insert 1 g into the vagina 2 (two) times a week, Disp: 42.5 g, Rfl: 0    levothyroxine 75 mcg tablet, TAKE 1 TABLET BY MOUTH EVERY DAY, Disp: 90 tablet, Rfl: 1    magnesium gluconate (MAGONATE) 500 mg tablet, Take 500 mg by mouth 2 (two) times a day, Disp: , Rfl:     Omega-3 Fatty Acids (Fish Oil) 1000 MG CPDR, 2 capsules twice a day, Disp: , Rfl:     omeprazole (PriLOSEC) 40 MG capsule, TAKE 1 CAPSULE BY MOUTH EVERY DAY BEFORE BREAKFAST, Disp: 90 capsule, Rfl: 1    rosuvastatin (CRESTOR) 5 mg tablet, TAKE 1 TABLET (5 MG TOTAL) BY MOUTH DAILY., Disp: 100 tablet, Rfl: 1      Allergies:  No Known Allergies      Assessment and Plan:  1. Shortness of breath on exertion (Primary)  2. Abnormal ECG  In view of patient's SOB which could be an anginal equivalent and abnormal EKG, an exercise nuclear stress test has been ordered for evaluation  - Ambulatory Referral to Cardiology  - NM myocardial perfusion spect (stress and/or rest); Future    3. Mixed hyperlipidemia  Continue statin and diet control.  Her PCP closely monitors her blood work    Recommend aggressive risk factor modification and therapeutic lifestyle changes.  Low-salt, low-calorie, low-fat, low-cholesterol diet with regular exercise and to optimize weight.  I will defer the ordering and monitoring of necessity lab studies to you, but I am available and happy to review and manage any of the data at your request in the future.    Discussed concepts of atherosclerosis, including signs and symptoms of cardiac disease.    Previous studies were reviewed.    Safety measures were reviewed.  Questions were entertained and answered.  Patient was  advised to report any problems requiring medical attention.    Follow-up with PCP and appropriate specialist and lab work as discussed.    Return for follow up visit as scheduled or earlier, if needed.  Thank you for allowing me to participate in the care and evaluation of your patient.  Should you have any questions, please feel free to contact me.    Cal Prince MD  11/26/2024,8:47 AM

## 2024-12-04 DIAGNOSIS — E03.9 HYPOTHYROIDISM, UNSPECIFIED TYPE: ICD-10-CM

## 2024-12-05 RX ORDER — LEVOTHYROXINE SODIUM 75 UG/1
75 TABLET ORAL DAILY
Qty: 30 TABLET | Refills: 0 | Status: SHIPPED | OUTPATIENT
Start: 2024-12-05

## 2024-12-09 ENCOUNTER — PREP FOR PROCEDURE (OUTPATIENT)
Age: 69
End: 2024-12-09

## 2024-12-09 DIAGNOSIS — Z12.11 SCREENING FOR COLON CANCER: Primary | ICD-10-CM

## 2024-12-09 NOTE — TELEPHONE ENCOUNTER
12/09/24  Screened by: Yoselyn Butterfield    Referring Provider Dr. Reyes    Pre- Screening:     There is no height or weight on file to calculate BMI. 32.28  Has patient been referred for a routine screening Colonoscopy? yes  Is the patient between 45-75 years old? yes      Previous Colonoscopy yes   If yes:    Date: 5 years ago    Facility:     Reason:         Does the patient want to see a Gastroenterologist prior to their procedure OR are they having any GI symptoms? no    Has the patient been hospitalized or had abdominal surgery in the past 6 months? no    Does the patient use supplemental oxygen? no    Does the patient take Coumadin, Lovenox, Plavix, Elliquis, Xarelto, or other blood thinning medication? no    Has the patient had a stroke, cardiac event, or stent placed in the past year? no

## 2024-12-09 NOTE — TELEPHONE ENCOUNTER
Scheduled date of colonoscopy (as of today): 1/13/25    Physician performing colonoscopy: Dr. Reyes    Location of colonoscopy: [facility]    Bowel prep reviewed with patient: Chandana/Petey    Instructions reviewed with patient by: Prep instructions sent via [facility]

## 2024-12-17 ENCOUNTER — HOSPITAL ENCOUNTER (OUTPATIENT)
Dept: NON INVASIVE DIAGNOSTICS | Facility: CLINIC | Age: 69
Discharge: HOME | End: 2024-12-17

## 2024-12-20 ENCOUNTER — HOSPITAL ENCOUNTER (OUTPATIENT)
Dept: NON INVASIVE DIAGNOSTICS | Facility: CLINIC | Age: 69
Discharge: HOME | End: 2024-12-20
Payer: MEDICARE

## 2024-12-20 ENCOUNTER — RESULTS FOLLOW-UP (OUTPATIENT)
Dept: CARDIOLOGY CLINIC | Facility: CLINIC | Age: 69
End: 2024-12-20

## 2024-12-20 VITALS
HEIGHT: 65 IN | OXYGEN SATURATION: 99 % | DIASTOLIC BLOOD PRESSURE: 92 MMHG | WEIGHT: 194 LBS | BODY MASS INDEX: 32.32 KG/M2 | HEART RATE: 73 BPM | SYSTOLIC BLOOD PRESSURE: 162 MMHG

## 2024-12-20 DIAGNOSIS — R06.02 SHORTNESS OF BREATH ON EXERTION: ICD-10-CM

## 2024-12-20 DIAGNOSIS — R94.31 ABNORMAL ECG: ICD-10-CM

## 2024-12-20 LAB
CHEST PAIN STATEMENT: NORMAL
MAX DIASTOLIC BP: 86 MMHG
MAX HR PERCENT: 87 %
MAX HR: 133 BPM
MAX PREDICTED HEART RATE: 153 BPM
NUC STRESS EJECTION FRACTION: 70 %
PROTOCOL NAME: NORMAL
RATE PRESSURE PRODUCT: NORMAL
SL CV REST NUCLEAR ISOTOPE DOSE: 10.31 MCI
SL CV STRESS NUCLEAR ISOTOPE DOSE: 31.1 MCI
SL CV STRESS RECOVERY BP: NORMAL MMHG
SL CV STRESS RECOVERY HR: 86 BPM
SL CV STRESS RECOVERY O2 SAT: 98 %
SL CV STRESS STAGE REACHED: 3
STRESS ANGINA INDEX: 0
STRESS BASELINE BP: NORMAL MMHG
STRESS BASELINE HR: 73 BPM
STRESS O2 SAT REST: 99 %
STRESS PEAK HR: 133 BPM
STRESS POST ESTIMATED WORKLOAD: 10.1 METS
STRESS POST EXERCISE DUR MIN: 7 MIN
STRESS POST EXERCISE DUR MIN: 7 MIN
STRESS POST EXERCISE DUR SEC: 30 SEC
STRESS POST EXERCISE DUR SEC: 30 SEC
STRESS POST O2 SAT PEAK: 100 %
STRESS POST PEAK BP: 214 MMHG
STRESS POST PEAK HR: 133 BPM
STRESS POST PEAK SYSTOLIC BP: 214 MMHG
STRESS/REST PERFUSION RATIO: 0.89
TARGET HR FORMULA: NORMAL
TEST INDICATION: NORMAL

## 2024-12-20 PROCEDURE — A9502 TC99M TETROFOSMIN: HCPCS

## 2024-12-20 PROCEDURE — 78452 HT MUSCLE IMAGE SPECT MULT: CPT | Performed by: INTERNAL MEDICINE

## 2024-12-20 PROCEDURE — 93017 CV STRESS TEST TRACING ONLY: CPT

## 2024-12-20 PROCEDURE — 78452 HT MUSCLE IMAGE SPECT MULT: CPT

## 2024-12-20 PROCEDURE — 93016 CV STRESS TEST SUPVJ ONLY: CPT | Performed by: INTERNAL MEDICINE

## 2024-12-20 PROCEDURE — 93018 CV STRESS TEST I&R ONLY: CPT | Performed by: INTERNAL MEDICINE

## 2024-12-23 LAB
CHEST PAIN STATEMENT: NORMAL
MAX DIASTOLIC BP: 86 MMHG
MAX PREDICTED HEART RATE: 153 BPM
PROTOCOL NAME: NORMAL
STRESS POST EXERCISE DUR MIN: 7 MIN
STRESS POST EXERCISE DUR SEC: 30 SEC
STRESS POST PEAK HR: 133 BPM
STRESS POST PEAK SYSTOLIC BP: 214 MMHG
TARGET HR FORMULA: NORMAL
TEST INDICATION: NORMAL

## 2025-01-07 ENCOUNTER — OFFICE VISIT (OUTPATIENT)
Dept: BARIATRICS | Facility: CLINIC | Age: 70
End: 2025-01-07
Payer: MEDICARE

## 2025-01-07 VITALS
WEIGHT: 195.4 LBS | HEART RATE: 90 BPM | DIASTOLIC BLOOD PRESSURE: 90 MMHG | HEIGHT: 66 IN | SYSTOLIC BLOOD PRESSURE: 140 MMHG | BODY MASS INDEX: 31.4 KG/M2

## 2025-01-07 DIAGNOSIS — G47.33 OSA (OBSTRUCTIVE SLEEP APNEA): ICD-10-CM

## 2025-01-07 DIAGNOSIS — E66.09 CLASS 1 OBESITY DUE TO EXCESS CALORIES WITH SERIOUS COMORBIDITY AND BODY MASS INDEX (BMI) OF 32.0 TO 32.9 IN ADULT: Primary | ICD-10-CM

## 2025-01-07 DIAGNOSIS — E66.811 CLASS 1 OBESITY DUE TO EXCESS CALORIES WITH SERIOUS COMORBIDITY AND BODY MASS INDEX (BMI) OF 32.0 TO 32.9 IN ADULT: Primary | ICD-10-CM

## 2025-01-07 PROCEDURE — 99203 OFFICE O/P NEW LOW 30 MIN: CPT

## 2025-01-07 RX ORDER — DEXTROAMPHETAMINE SACCHARATE, AMPHETAMINE ASPARTATE MONOHYDRATE, DEXTROAMPHETAMINE SULFATE, AMPHETAMINE SULFATE 12.5; 12.5; 12.5; 12.5 MG/1; MG/1; MG/1; MG/1
CAPSULE, EXTENDED RELEASE ORAL
COMMUNITY
Start: 2024-12-27

## 2025-01-07 RX ORDER — TIRZEPATIDE 2.5 MG/.5ML
2.5 INJECTION, SOLUTION SUBCUTANEOUS WEEKLY
Qty: 2 ML | Refills: 0 | Status: SHIPPED | OUTPATIENT
Start: 2025-01-07 | End: 2025-02-04

## 2025-01-07 NOTE — PATIENT INSTRUCTIONS
Zepbound Instructions:    - Begin Zepbound 2.5 mg subcutaneously once a week. Dose changes may occur after 4 doses if medication is tolerated. You will be assessed prior to each dose change to make sure you are tolerating the medication well.  - Please message me when you have 2 pens left from the prescription so there are no lapses in treatment.  - If you have been off the medication for more than 14 days please contact the office as you will need to restart the titration at the starting dose again to avoid significant side effects or adverse events.  - Visit [email or web address] for further information/injection instructions.   -Please eat small frequent meals to help reduce nausea. Lemon water and saltine crackers may help with this.   - Side effects of Zepbound discussed: nausea, vomiting, diarrhea, and constipation. If you experience fever, nausea/vomiting, and pain radiating to your back this may be a sign of pancreatitis. Please go to the emergency room if this occurs.  - If on oral birth control a 2nd method of birth control is recommended during the 1st 8 weeks of therapy and for 4 weeks after any dosage change.   - Patient understands the side effects of the medication and proper administration. Patient agrees with the treatment plan and all questions were answered.

## 2025-01-07 NOTE — PROGRESS NOTES
Assessment/Plan:    Class 1 obesity due to excess calories with serious comorbidity and body mass index (BMI) of 32.0 to 32.9 in adult  - Discussed options of HealthyCORE-Intensive Lifestyle Intervention Program, Very Low Calorie Diet-VLCD, and Conservative Program and the role of weight loss medications.  - Patient is interested in pursuing HealthyCORE-Intensive Lifestyle Intervention Program and follow up visits with medical weight management provider.  - Explained the importance of making lifestyle changes in addition to starting any anti-obesity medications.   - Initial weight loss goal of 5-10% weight loss for improved health. Weight loss can improve patient's co-morbid conditions and/or prevent weight-related complications.  - Weight is not at goal and patient has been unable to achieve a meaningful weight loss above 5% using various programs and tools for more than 6 months  - Labs reviewed from Has her labs pending form her PCP    General Recommendations:  Nutrition:  Eat breakfast daily.  Do not skip meals.      Food log (ie.) www.myfitnesspal.com, sparkpeople.com, loseit.com, calorieking.com, etc.     Practice mindful eating.  Be sure to set aside time to eat, eat slowly, and savor your food.     Hydration:    At least 64oz of water daily.  No sugar sweetened beverages.  No juice (eat the fruit instead).     Exercise:  Studies have shown that the ideal exercise goal is somewhere between 150 to 300 minutes of moderate intensity exercise a week.  Start with exercising 10 minutes every other day and gradually increase physical activity with a goal of at least 150 minutes of moderate intensity exercise a week, divided over at least 3 days a week.  An example of this would be exercising 30 minutes a day, 5 days a week.  Resistance training can increase muscle mass and increase our resting metabolic rate.   FULL BODY resistance training is recommended 2-3 times a week.  Do not do this on consecutive days to  allow for muscle recovery.     Aim for a bare minimum 5000 steps, even on days you do not exercise.     Monitoring:   Weigh yourself daily.  If this causes undue stress, then just weigh yourself once a week.  Weigh yourself the same time of the day with the same amount of clothing on.  Preferably this should be done after waking up, before you eat, and with no clothing or minimal clothing on.     Specific Goals:  Calorie goal:  0679-6581 maeve/day (Provided with meal plan to follow).   Discussed her lifestyle and nutrition at length along with her barriers to weight loss. She is someone who enjoys cooking at home and is aware that she may not always be eating the right things.   Encouraged to start tracking her food and maintain a calorie deficit. She is also encouraged to pay attention to her protein intake and increase that to at least 60g -90g per day and she is encouraged to get 3 meals a day and not skip meals. Reviewed protein rich snack ideas and shakes.   Discussed starting exercise 1-2 days per week for at least 10-15 minutes per day and increasing from there as able. Discussed importance of strength training as well.   Discussed medications: CI to phentermine/ topamax as she is being treated for anxiety and has had a kidney stone in the past. CI to Wellbutrin as she is worried about the side effect profile.   Discussed GLP 1 and explained that medicare will most likely not cover these. She does have secondary insurance so she would like us to try for Zepbound even though her plan says wegovy may be covered over Zepbound.   In the meantime she is interested in starting Healthy [facility].   Follow up in 3 months.  Zepbound Instructions Reviewed:  - Begin Zepbound 2.5 mg subcutaneously once a week. Dose changes may occur after 4 doses if medication is tolerated. You will be assessed prior to each dose change to make sure you are tolerating the medication well.  - Please message me when you have 2 pens left from the  prescription so there are no lapses in treatment.  - If you have been off the medication for more than 14 days please contact the office as you will need to restart the titration at the starting dose again to avoid significant side effects or adverse events.  - Visit Zepbound.com for further information/injection instructions.   -Please eat small frequent meals to help reduce nausea. Lemon water and saltine crackers may help with this.   - Side effects of Zepbound discussed: nausea, vomiting, diarrhea, and constipation. If you experience fever, nausea/vomiting, and pain radiating to your back this may be a sign of pancreatitis. Please go to the emergency room if this occurs.  - If on oral birth control a 2nd method of birth control is recommended during the 1st 8 weeks of therapy and for 4 weeks after any dosage change.   - Patient understands the side effects of the medication and proper administration. Patient agrees with the treatment plan and all questions were answered. Medication consent was reviewed today and all questions were answered.  Patient agreed with all bulleted points.  Consent was signed, scanned into chart and patient was provided with a copy for their records.   -Patient denies personal and family history of  pancreatitis, thyroid cancer, MEN-2 tumors.  -Denies any hx of glaucoma, seizures, gallstones, or gastroparesis.   -Denies Hx of CAD, PAD, palpitations, arrhythmia.   -Denies suicidal behavior or thinking.   -Denies insomnia or sleep disturbance.           Pat was seen today for consult.    Diagnoses and all orders for this visit:    Class 1 obesity due to excess calories with serious comorbidity and body mass index (BMI) of 32.0 to 32.9 in adult  -     Ambulatory Referral to Weight Management  -     tirzepatide (Zepbound) 2.5 mg/0.5 mL auto-injector; Inject 0.5 mL (2.5 mg total) under the skin once a week for 28 days    RUBEN (obstructive sleep apnea)  -     tirzepatide (Zepbound) 2.5 mg/0.5  mL auto-injector; Inject 0.5 mL (2.5 mg total) under the skin once a week for 28 days           Total time spent reviewing chart, interviewing patient, examining patient, discussing plan, answering all questions, and documentin min, with >50% face-to-face time spent counseling patient on nonsurgical interventions for the treatment of excess weight. Discussed in detail nonsurgical options including intensive lifestyle intervention program, very low-calorie diet program and conservative program.  Discussed the role of weight loss medications.  Counseled patient on diet behavior and exercise modification for weight loss.    Follow up in approximately 3 months with Non-Surgical Physician/Advanced Practitioner.    Subjective:   Chief Complaint   Patient presents with    Consult     Pt here today for MWM consult        Patient ID: Claudia Fulton  is a 69 y.o. female with excess weight/obesity here to pursue weight management.  Previous notes and records have been reviewed.    Past Medical History:   Diagnosis Date    Abnormal ECG     Anxiety     Chronic kidney disease     renal sac- pt denied.    Depression     Disease of thyroid gland     Dysuria     LAST ASSESSED [id number]    Endometriosis     Gout     Headache(784.0) 2021    Periodic    History of fractured kneecap     hairline fracture right knee due to accident     Obesity 2021    Gained 40 lbs since Covid     Past Surgical History:   Procedure Laterality Date    COLONOSCOPY      INGUINAL HERNIA REPAIR      LAST ASSESSED [id number]    OOPHORECTOMY  2004    thinks right side    OTHER SURGICAL HISTORY      FACE SURGERY  -- LAST ASSESSED [id number]    OTHER SURGICAL HISTORY      stiches in chin due to accident     [place] COLONOSCOPY FLX DX W/COLLJ SPEC WHEN PFRMD N/A 2018    Procedure: EGD AND COLONOSCOPY;  Surgeon: Can Davis MD;  Location: [facility];  Service: Gastroenterology       HPI:  Wt Readings from Last 20 Encounters:   25  88.6 kg (195 lb 6.4 oz)   01/02/25 88 kg (194 lb)   12/20/24 88 kg (194 lb)   11/26/24 88 kg (194 lb)   07/11/24 88 kg (194 lb)   12/08/23 92.5 kg (204 lb)   12/05/23 92.1 kg (203 lb)   12/01/23 88.9 kg (196 lb)   09/01/23 88.3 kg (194 lb 9.6 oz)   06/01/23 89.8 kg (198 lb)   11/18/22 92.5 kg (204 lb)   09/13/22 92.5 kg (204 lb)   01/31/22 92.5 kg (204 lb)   11/19/21 92.6 kg (204 lb 3.2 oz)   10/05/21 95.7 kg (211 lb)   03/22/21 96.1 kg (211 lb 12.8 oz)   09/30/20 93.6 kg (206 lb 6.4 oz)   03/12/20 97.1 kg (214 lb)   03/02/20 97.1 kg (214 lb)   12/31/19 94.8 kg (209 lb)       Patient presents today to medical weight management office for consult. She is here to discuss weight loss and is interested in medications but also wants to learn how to do it naturally. She has done WW in the past and has had success with her lowest weight being 170lbs, from her highest of 230lbs. She has noticed over the last few years that it has been harder for her to lose weight and maintain a significant loss and she fears getting back up to 230 lbs if she is not careful. She has a history of family obesity and diabetes and she would also like to avoid that.     Starting MWM weight: 195 lbs   Starting BMI: 32  Starting Waist Measurement: 41 inches   Goal weight: 150 Lbs     Obesity/Excess Weight:  Severity: Moderate  Onset:  last 5 or so years     Modifiers: Diet and Exercise  Contributing factors: Poor Food Choices, Insufficient Physical Activity, Binge Eating, Lack of knowledge of appropriate lifestyle changes, and Insufficient time to make appropriate lifestyle changes  Associated symptoms: comorbid conditions, fatigue, increased joint pain, decreased exercise capacity, body image issues, decreased self esteem, increased shortness of breath, decreased mobility, inability to do certain activities, and clothes do not fit    Diet recall:  B: Non fat yogurt with fruit/ Coffee and tea biscuits with peanut butter  L: Skips sometimes OR  protein bar or egg   D: Homemade [place] onion soup   S: Sweet tooth at night time   Take out frequency: Rare     Hydration: Water but not enough, sparkling water, kambucha, 2 cups coffee   Alcohol: no  Smoking: no  Exercise: no   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    Family History   Problem Relation Age of Onset    Atrial fibrillation Mother     Depression Mother     Dementia Mother     Heart attack Mother          in 2018    Alcohol abuse Father     Heart attack Father     Schizophrenia Sister     Heart attack Sister     No Known Problems Daughter     No Known Problems Daughter     Heart attack Maternal Grandfather     Dementia Maternal Grandfather         Heart disease    Melanoma Paternal Grandmother     Dementia Paternal Grandmother         Skin cancer    Heart attack Paternal Grandfather     No Known Problems Maternal Aunt     No Known Problems Maternal Aunt     No Known Problems Paternal Aunt     Breast cancer Neg Hx         Review of Systems   Constitutional:  Negative for fatigue.   HENT:  Negative for sore throat.    Respiratory:  Negative for cough and shortness of breath.    Cardiovascular:  Negative for chest pain, palpitations and leg swelling.   Gastrointestinal:  Negative for abdominal pain, constipation, diarrhea, nausea and vomiting.   Genitourinary:  Negative for dysuria.   Musculoskeletal:  Negative for arthralgias and back pain.   Skin:  Negative for rash.   Neurological:  Negative for headaches.   Psychiatric/Behavioral:  Negative for dysphoric mood. The patient is not nervous/anxious.        Objective:  /90 (BP Location: Right arm, Patient  "Position: Sitting, Cuff Size: Standard)   Pulse 90   Ht 5' 5.5\" (1.664 m)   Wt 88.6 kg (195 lb 6.4 oz)   BMI 32.02 kg/m²     Physical Exam  Vitals and nursing note reviewed.   Constitutional:       Appearance: Normal appearance. She is obese.   HENT:      Head: Normocephalic.   Pulmonary:      Effort: Pulmonary effort is normal.   Neurological:      Mental Status: She is oriented to person, place, and time.   Psychiatric:         Mood and Affect: Mood normal.         Behavior: Behavior normal.         Thought Content: Thought content normal.         Judgment: Judgment normal.              Labs and Imaging  Recent labs and imaging have been personally reviewed.  Lab Results   Component Value Date    WBC 5.31 11/29/2021    HGB 14.5 11/29/2021    HCT 43.6 11/29/2021    MCV 95 11/29/2021     11/29/2021     Lab Results   Component Value Date    SODIUM 138 11/28/2023    K 3.8 11/28/2023     11/28/2023    CO2 29 11/28/2023    AGAP 6 11/28/2023    BUN 16 11/28/2023    CREATININE 0.77 11/28/2023    GLUF 124 (H) 11/28/2023    CALCIUM 9.5 11/28/2023    AST 18 11/28/2023    ALT 14 11/28/2023    ALKPHOS 96 11/28/2023    TP 7.7 11/28/2023    TBILI 0.38 11/28/2023    EGFR 80 11/28/2023     No results found for: \"HGBA1C\"  Lab Results   Component Value Date    RFC2ZWHNCSON 6.606 (H) 11/28/2023     Lab Results   Component Value Date    CHOLESTEROL 199 11/28/2023     Lab Results   Component Value Date    HDL 68 11/28/2023     Lab Results   Component Value Date    TRIG 347 (H) 11/28/2023     Lab Results   Component Value Date    LDLCALC 62 11/28/2023     "

## 2025-01-07 NOTE — ASSESSMENT & PLAN NOTE
- Discussed options of HealthyCORE-Intensive Lifestyle Intervention Program, Very Low Calorie Diet-VLCD, and Conservative Program and the role of weight loss medications.  - Patient is interested in pursuing HealthyCORE-Intensive Lifestyle Intervention Program and follow up visits with medical weight management provider.  - Explained the importance of making lifestyle changes in addition to starting any anti-obesity medications.   - Initial weight loss goal of 5-10% weight loss for improved health. Weight loss can improve patient's co-morbid conditions and/or prevent weight-related complications.  - Weight is not at goal and patient has been unable to achieve a meaningful weight loss above 5% using various programs and tools for more than 6 months  - Labs reviewed from Has her labs pending form her PCP    General Recommendations:  Nutrition:  Eat breakfast daily.  Do not skip meals.      Food log (ie.) www.Prixel.com, sparkpeople.com, loseit.com, calorieking.com, etc.     Practice mindful eating.  Be sure to set aside time to eat, eat slowly, and savor your food.     Hydration:    At least 64oz of water daily.  No sugar sweetened beverages.  No juice (eat the fruit instead).     Exercise:  Studies have shown that the ideal exercise goal is somewhere between 150 to 300 minutes of moderate intensity exercise a week.  Start with exercising 10 minutes every other day and gradually increase physical activity with a goal of at least 150 minutes of moderate intensity exercise a week, divided over at least 3 days a week.  An example of this would be exercising 30 minutes a day, 5 days a week.  Resistance training can increase muscle mass and increase our resting metabolic rate.   FULL BODY resistance training is recommended 2-3 times a week.  Do not do this on consecutive days to allow for muscle recovery.     Aim for a bare minimum 5000 steps, even on days you do not exercise.     Monitoring:   Weigh yourself daily.   If this causes undue stress, then just weigh yourself once a week.  Weigh yourself the same time of the day with the same amount of clothing on.  Preferably this should be done after waking up, before you eat, and with no clothing or minimal clothing on.     Specific Goals:  Calorie goal:  0290-9616 maeve/day (Provided with meal plan to follow).   Discussed her lifestyle and nutrition at length along with her barriers to weight loss. She is someone who enjoys cooking at home and is aware that she may not always be eating the right things.   Encouraged to start tracking her food and maintain a calorie deficit. She is also encouraged to pay attention to her protein intake and increase that to at least 60g -90g per day and she is encouraged to get 3 meals a day and not skip meals. Reviewed protein rich snack ideas and shakes.   Discussed starting exercise 1-2 days per week for at least 10-15 minutes per day and increasing from there as able. Discussed importance of strength training as well.   Discussed medications: CI to phentermine/ topamax as she is being treated for anxiety and has had a kidney stone in the past. CI to Wellbutrin as she is worried about the side effect profile.   Discussed GLP 1 and explained that medicare will most likely not cover these. She does have secondary insurance so she would like us to try for Zepbound even though her plan says wegovy may be covered over Zepbound.   In the meantime she is interested in starting Healthy Core.   Follow up in 3 months.  Zepbound Instructions Reviewed:  - Begin Zepbound 2.5 mg subcutaneously once a week. Dose changes may occur after 4 doses if medication is tolerated. You will be assessed prior to each dose change to make sure you are tolerating the medication well.  - Please message me when you have 2 pens left from the prescription so there are no lapses in treatment.  - If you have been off the medication for more than 14 days please contact the office as  you will need to restart the titration at the starting dose again to avoid significant side effects or adverse events.  - Visit Zepbound.com for further information/injection instructions.   -Please eat small frequent meals to help reduce nausea. Lemon water and saltine crackers may help with this.   - Side effects of Zepbound discussed: nausea, vomiting, diarrhea, and constipation. If you experience fever, nausea/vomiting, and pain radiating to your back this may be a sign of pancreatitis. Please go to the emergency room if this occurs.  - If on oral birth control a 2nd method of birth control is recommended during the 1st 8 weeks of therapy and for 4 weeks after any dosage change.   - Patient understands the side effects of the medication and proper administration. Patient agrees with the treatment plan and all questions were answered. Medication consent was reviewed today and all questions were answered.  Patient agreed with all bulleted points.  Consent was signed, scanned into chart and patient was provided with a copy for their records.   -Patient denies personal and family history of  pancreatitis, thyroid cancer, MEN-2 tumors.  -Denies any hx of glaucoma, seizures, gallstones, or gastroparesis.   -Denies Hx of CAD, PAD, palpitations, arrhythmia.   -Denies suicidal behavior or thinking.   -Denies insomnia or sleep disturbance.

## 2025-01-09 DIAGNOSIS — E03.9 HYPOTHYROIDISM, UNSPECIFIED TYPE: ICD-10-CM

## 2025-01-10 RX ORDER — LEVOTHYROXINE SODIUM 75 UG/1
75 TABLET ORAL DAILY
Qty: 90 TABLET | Refills: 1 | Status: SHIPPED | OUTPATIENT
Start: 2025-01-10

## 2025-01-13 ENCOUNTER — HOSPITAL ENCOUNTER (OUTPATIENT)
Dept: GASTROENTEROLOGY | Facility: HOSPITAL | Age: 70
Setting detail: OUTPATIENT SURGERY
Discharge: HOME | End: 2025-01-13
Attending: INTERNAL MEDICINE
Payer: MEDICARE

## 2025-01-13 ENCOUNTER — ANESTHESIA (OUTPATIENT)
Dept: GASTROENTEROLOGY | Facility: HOSPITAL | Age: 70
End: 2025-01-13
Payer: MEDICARE

## 2025-01-13 ENCOUNTER — ANESTHESIA EVENT (OUTPATIENT)
Dept: GASTROENTEROLOGY | Facility: HOSPITAL | Age: 70
End: 2025-01-13
Payer: MEDICARE

## 2025-01-13 VITALS
HEART RATE: 56 BPM | OXYGEN SATURATION: 98 % | WEIGHT: 191.8 LBS | BODY MASS INDEX: 31.96 KG/M2 | DIASTOLIC BLOOD PRESSURE: 61 MMHG | TEMPERATURE: 97.5 F | HEIGHT: 65 IN | RESPIRATION RATE: 20 BRPM | SYSTOLIC BLOOD PRESSURE: 122 MMHG

## 2025-01-13 DIAGNOSIS — Z12.11 SCREENING FOR COLON CANCER: ICD-10-CM

## 2025-01-13 PROCEDURE — 88305 TISSUE EXAM BY PATHOLOGIST: CPT | Performed by: STUDENT IN AN ORGANIZED HEALTH CARE EDUCATION/TRAINING PROGRAM

## 2025-01-13 PROCEDURE — 45390 COLONOSCOPY W/RESECTION: CPT | Performed by: INTERNAL MEDICINE

## 2025-01-13 RX ORDER — LIDOCAINE HYDROCHLORIDE 10 MG/ML
INJECTION, SOLUTION EPIDURAL; INFILTRATION; INTRACAUDAL; PERINEURAL AS NEEDED
Status: DISCONTINUED | OUTPATIENT
Start: 2025-01-13 | End: 2025-01-13

## 2025-01-13 RX ORDER — SODIUM CHLORIDE, SODIUM LACTATE, POTASSIUM CHLORIDE, CALCIUM CHLORIDE 600; 310; 30; 20 MG/100ML; MG/100ML; MG/100ML; MG/100ML
125 INJECTION, SOLUTION INTRAVENOUS CONTINUOUS
Status: DISCONTINUED | OUTPATIENT
Start: 2025-01-13 | End: 2025-01-13

## 2025-01-13 RX ORDER — SODIUM CHLORIDE, SODIUM LACTATE, POTASSIUM CHLORIDE, CALCIUM CHLORIDE 600; 310; 30; 20 MG/100ML; MG/100ML; MG/100ML; MG/100ML
INJECTION, SOLUTION INTRAVENOUS CONTINUOUS PRN
Status: DISCONTINUED | OUTPATIENT
Start: 2025-01-13 | End: 2025-01-13

## 2025-01-13 RX ORDER — SODIUM CHLORIDE, SODIUM LACTATE, POTASSIUM CHLORIDE, CALCIUM CHLORIDE 600; 310; 30; 20 MG/100ML; MG/100ML; MG/100ML; MG/100ML
125 INJECTION, SOLUTION INTRAVENOUS CONTINUOUS
Status: CANCELLED | OUTPATIENT
Start: 2025-01-13

## 2025-01-13 RX ORDER — PROPOFOL 10 MG/ML
INJECTION, EMULSION INTRAVENOUS AS NEEDED
Status: DISCONTINUED | OUTPATIENT
Start: 2025-01-13 | End: 2025-01-13

## 2025-01-13 RX ORDER — SODIUM CHLORIDE, SODIUM LACTATE, POTASSIUM CHLORIDE, CALCIUM CHLORIDE 600; 310; 30; 20 MG/100ML; MG/100ML; MG/100ML; MG/100ML
50 INJECTION, SOLUTION INTRAVENOUS CONTINUOUS
Status: DISCONTINUED | OUTPATIENT
Start: 2025-01-13 | End: 2025-01-13

## 2025-01-13 RX ADMIN — PROPOFOL 30 MG: 10 INJECTION, EMULSION INTRAVENOUS at 11:56

## 2025-01-13 RX ADMIN — PROPOFOL 30 MG: 10 INJECTION, EMULSION INTRAVENOUS at 11:38

## 2025-01-13 RX ADMIN — PROPOFOL 40 MG: 10 INJECTION, EMULSION INTRAVENOUS at 11:47

## 2025-01-13 RX ADMIN — PROPOFOL 30 MG: 10 INJECTION, EMULSION INTRAVENOUS at 12:06

## 2025-01-13 RX ADMIN — PROPOFOL 30 MG: 10 INJECTION, EMULSION INTRAVENOUS at 11:42

## 2025-01-13 RX ADMIN — LIDOCAINE HYDROCHLORIDE 50 MG: 10 INJECTION, SOLUTION EPIDURAL; INFILTRATION; INTRACAUDAL; PERINEURAL at 11:23

## 2025-01-13 RX ADMIN — PROPOFOL 20 MG: 10 INJECTION, EMULSION INTRAVENOUS at 11:29

## 2025-01-13 RX ADMIN — PROPOFOL 20 MG: 10 INJECTION, EMULSION INTRAVENOUS at 11:34

## 2025-01-13 RX ADMIN — PROPOFOL 40 MG: 10 INJECTION, EMULSION INTRAVENOUS at 12:01

## 2025-01-13 RX ADMIN — PROPOFOL 130 MG: 10 INJECTION, EMULSION INTRAVENOUS at 11:23

## 2025-01-13 RX ADMIN — PROPOFOL 30 MG: 10 INJECTION, EMULSION INTRAVENOUS at 11:32

## 2025-01-13 RX ADMIN — PROPOFOL 20 MG: 10 INJECTION, EMULSION INTRAVENOUS at 11:27

## 2025-01-13 RX ADMIN — PROPOFOL 30 MG: 10 INJECTION, EMULSION INTRAVENOUS at 11:25

## 2025-01-13 RX ADMIN — SODIUM CHLORIDE, SODIUM LACTATE, POTASSIUM CHLORIDE, AND CALCIUM CHLORIDE: .6; .31; .03; .02 INJECTION, SOLUTION INTRAVENOUS at 10:21

## 2025-01-13 RX ADMIN — PROPOFOL 50 MG: 10 INJECTION, EMULSION INTRAVENOUS at 11:52

## 2025-01-13 NOTE — H&P
History and Physical -  [facility]  Claudia Fulton 69 y.o. female MRN: [id number]      HPI: Claudia Fulton is a 69 y.o. year old female who presents for history of colon polyps      REVIEW OF SYSTEMS: Per the HPI, and otherwise unremarkable.    Historical Information   Past Medical History:   Diagnosis Date    Abnormal ECG     Anxiety     Chronic kidney disease     renal sac- pt denied.    Colon polyp     Depression     Disease of thyroid gland     Dysuria     LAST ASSESSED [id number]    Endometriosis     Gout     Headache(784.0) 2021    Periodic    History of fractured kneecap     hairline fracture right knee due to accident     Obesity 2021    Gained 40 lbs since Covid     Past Surgical History:   Procedure Laterality Date    COLONOSCOPY      INGUINAL HERNIA REPAIR      LAST ASSESSED [id number]    OOPHORECTOMY      thinks right side    OTHER SURGICAL HISTORY      FACE SURGERY  -- LAST ASSESSED [id number]    OTHER SURGICAL HISTORY      stiches in chin due to accident     [place] COLONOSCOPY FLX DX W/COLLJ SPEC WHEN PFRMD N/A 2018    Procedure: EGD AND COLONOSCOPY;  Surgeon: Can Davis MD;  Location: [facility];  Service: Gastroenterology     Social History   Social History     Substance and Sexual Activity   Alcohol Use Not Currently    Comment: Stopped 23 years ago     Social History     Substance and Sexual Activity   Drug Use Not Currently    Types: LSD, Marijuana    Comment: Expermented in college -     Social History     Tobacco Use   Smoking Status Never   Smokeless Tobacco Never     Family History   Problem Relation Age of Onset    Atrial fibrillation Mother     Depression Mother     Dementia Mother     Heart attack Mother          in 2018    Alcohol abuse Father     Heart attack Father     Schizophrenia Sister     Heart attack Sister     No Known Problems Daughter     No Known Problems Daughter     Heart attack Maternal Grandfather     Dementia  "Maternal Grandfather         Heart disease    Melanoma Paternal Grandmother     Dementia Paternal Grandmother         Skin cancer    Heart attack Paternal Grandfather     No Known Problems Maternal Aunt     No Known Problems Maternal Aunt     No Known Problems Paternal Aunt     Breast cancer Neg Hx        Meds/Allergies     Not in a hospital admission.    No Known Allergies    Objective     Blood pressure 133/63, pulse 78, temperature 97.6 °F (36.4 °C), temperature source Temporal, resp. rate 18, height 5' 5\" (1.651 m), weight 87 kg (191 lb 12.8 oz), SpO2 100%, not currently breastfeeding.      PHYSICAL EXAM    Gen: NAD  CV: RRR  CHEST: Clear  ABD: soft, NT/ND  EXT: no edema      ASSESSMENT/PLAN:  This is a 69 y.o. year old female here for colonoscopy, and she is stable and optimized for her procedure.          "

## 2025-01-13 NOTE — ANESTHESIA PREPROCEDURE EVALUATION
Procedure:  COLONOSCOPY    Relevant Problems   ANESTHESIA (within normal limits)      CARDIO   (+) Hyperlipidemia, mixed      ENDO   (+) Hypothyroidism      GI/HEPATIC  Confirmed NPO appropriate  S/p bowel prep   (+) GERD (gastroesophageal reflux disease)      /RENAL (within normal limits)      HEMATOLOGY (within normal limits)      MUSCULOSKELETAL   (+) Arthritis   (+) Lumbar spondylosis      NEURO/PSYCH   (+) Anxiety   (+) Depression, recurrent (HCC)      PULMONARY   (+) RUBEN (obstructive sleep apnea)   (-) Smoking   (-) URI (upper respiratory infection)      Other   (+) Class 1 obesity due to excess calories with serious comorbidity and body mass index (BMI) of 32.0 to 32.9 in adult        Physical Exam    Airway    Mallampati score: II  TM Distance: >3 FB  Neck ROM: full     Dental    upper dentures and lower dentures    Cardiovascular  Rhythm: regular, Rate: normal    Pulmonary   Breath sounds clear to auscultation    Other Findings  post-pubertal.      Anesthesia Plan  ASA Score- 2     Anesthesia Type- IV sedation with anesthesia with ASA Monitors.         Additional Monitors:     Airway Plan:     Comment: I discussed the risks and benefits of IV sedation anesthesia including the possibility of the need to convert to general anesthesia and the potential risk of awareness.  The patient was given the opportunity to ask questions, which were answered..       Plan Factors-Exercise tolerance (METS): >4 METS.    Chart reviewed.        Patient is not a current smoker.              Induction- intravenous.    Postoperative Plan-         Informed Consent- Anesthetic plan and risks discussed with patient.  I personally reviewed this patient with the CRNA. Discussed and agreed on the Anesthesia Plan with the CRNA..      NM Stress 12/20/24:    Interpretation Summary    Stress ECG: No ST deviation is noted. The ECG was negative for ischemia at a maximal HR of 133 bpm (87% of MPHR) and 10.1 METS.    Stress Function: Left  ventricular function post-stress is normal. Stress ejection fraction is 70%.    Perfusion: There are no perfusion defects.    Stress Combined Conclusion: The ECG and SPECT imaging portions of the stress study are concordant with no evidence of stress induced myocardial ischemia. Left ventricular perfusion is normal.

## 2025-01-13 NOTE — ANESTHESIA POSTPROCEDURE EVALUATION
Post-Op Assessment Note    CV Status:  Stable  Pain Score: 0    Pain management: adequate       Mental Status:  Alert and awake   Hydration Status:  Stable   PONV Controlled:  None   Airway Patency:  Patent     Post Op Vitals Reviewed: Yes    No anethesia notable event occurred.    Staff: CRNA           Last Filed PACU Vitals:  Vitals Value Taken Time   Temp 97.5 °F (36.4 °C) 01/13/25 1213   Pulse 51 01/13/25 1213   /65 01/13/25 1213   Resp 16 01/13/25 1213   SpO2 99 % 01/13/25 1213       Modified Mumtaz:     Vitals Value Taken Time   Activity 2 01/13/25 1213   Respiration 2 01/13/25 1213   Circulation 2 01/13/25 1213   Consciousness 2 01/13/25 1213   Oxygen Saturation 2 01/13/25 1213     Modified Mumtaz Score: 10

## 2025-01-15 ENCOUNTER — RESULTS FOLLOW-UP (OUTPATIENT)
Dept: GASTROENTEROLOGY | Facility: CLINIC | Age: 70
End: 2025-01-15

## 2025-01-15 ENCOUNTER — OFFICE VISIT (OUTPATIENT)
Dept: FAMILY MEDICINE CLINIC | Facility: CLINIC | Age: 70
End: 2025-01-15
Payer: MEDICARE

## 2025-01-15 VITALS
BODY MASS INDEX: 32.32 KG/M2 | DIASTOLIC BLOOD PRESSURE: 88 MMHG | SYSTOLIC BLOOD PRESSURE: 128 MMHG | HEIGHT: 65 IN | HEART RATE: 86 BPM | TEMPERATURE: 97.7 F | OXYGEN SATURATION: 98 % | WEIGHT: 194 LBS

## 2025-01-15 DIAGNOSIS — E78.2 HYPERLIPIDEMIA, MIXED: ICD-10-CM

## 2025-01-15 DIAGNOSIS — E66.811 CLASS 1 OBESITY DUE TO EXCESS CALORIES WITH SERIOUS COMORBIDITY AND BODY MASS INDEX (BMI) OF 32.0 TO 32.9 IN ADULT: ICD-10-CM

## 2025-01-15 DIAGNOSIS — F33.9 DEPRESSION, RECURRENT (HCC): ICD-10-CM

## 2025-01-15 DIAGNOSIS — E03.9 ACQUIRED HYPOTHYROIDISM: ICD-10-CM

## 2025-01-15 DIAGNOSIS — E66.09 CLASS 1 OBESITY DUE TO EXCESS CALORIES WITH SERIOUS COMORBIDITY AND BODY MASS INDEX (BMI) OF 32.0 TO 32.9 IN ADULT: ICD-10-CM

## 2025-01-15 DIAGNOSIS — K21.9 GASTROESOPHAGEAL REFLUX DISEASE: Primary | ICD-10-CM

## 2025-01-15 PROCEDURE — 99214 OFFICE O/P EST MOD 30 MIN: CPT | Performed by: PHYSICIAN ASSISTANT

## 2025-01-15 PROCEDURE — 88305 TISSUE EXAM BY PATHOLOGIST: CPT | Performed by: STUDENT IN AN ORGANIZED HEALTH CARE EDUCATION/TRAINING PROGRAM

## 2025-01-15 RX ORDER — OMEPRAZOLE 40 MG/1
40 CAPSULE, DELAYED RELEASE ORAL
Qty: 90 CAPSULE | Refills: 1 | Status: SHIPPED | OUTPATIENT
Start: 2025-01-15

## 2025-01-15 NOTE — ASSESSMENT & PLAN NOTE
Depression Screening Follow-up Plan: Patient's depression screening was positive with a PHQ-9 score of 21. Patient with underlying depression and was advised to continue current medications as prescribed.

## 2025-01-15 NOTE — PROGRESS NOTES
Name: Claudia Fulton      : 1955      MRN: [id number]  Encounter Provider: Maida Hitchcock PA-C  Encounter Date: 1/15/2025   Encounter department: [facility] [address]  :  Assessment & Plan  Gastroesophageal reflux disease  Worsening  Take daily for 2 months  Orders:  •  omeprazole (PriLOSEC) 40 MG capsule; Take 1 capsule (40 mg total) by mouth daily before breakfast    Hyperlipidemia, mixed  To get lab work done  Continue Crestor 5 mg       Acquired hypothyroidism  Asymptomatic  Get lab work done and continue current dose of levothyroxine       Class 1 obesity due to excess calories with serious comorbidity and body mass index (BMI) of 32.0 to 32.9 in adult    BMI Counseling: Body mass index is 32.28 kg/m². The BMI             Depression, recurrent (HCC)  Depression Screening Follow-up Plan: Patient's depression screening was positive with a PHQ-9 score of 21. Patient with underlying depression and was advised to continue current medications as prescribed.               BMI Counseling: Body mass index is 32.28 kg/m². The BMI is above normal. Exercise recommendations include exercising 3-5 times per week.       History of Present Illness     Pt is here for her 6 month follow up. She is having worsening GERD and takes omeprazole only as needed. She is going to weight management now.    Hyperlipidemia  This is a chronic problem. The current episode started more than 1 year ago. The problem is controlled. Exacerbating diseases include hypothyroidism and obesity. She has no history of chronic renal disease, diabetes, liver disease or nephrotic syndrome. There are no known factors aggravating her hyperlipidemia. Pertinent negatives include no chest pain, myalgias or shortness of breath. Current antihyperlipidemic treatment includes statins. The current treatment provides significant improvement of lipids. There are no compliance problems.  Risk factors for  coronary artery disease include family history, dyslipidemia, obesity, post-menopausal and a sedentary lifestyle.   Thyroid Problem  Presents for follow-up visit. Symptoms include depressed mood. Patient reports no anxiety, cold intolerance, constipation, diaphoresis, diarrhea, dry skin, fatigue, hair loss, heat intolerance, hoarse voice, leg swelling, nail problem, palpitations, tremors, visual change or weight loss. The symptoms have been stable. Her past medical history is significant for hyperlipidemia. There is no history of diabetes.   Heartburn  She complains of abdominal pain, heartburn and nausea. She reports no belching, no chest pain, no choking, no coughing, no dysphagia, no early satiety, no globus sensation, no hoarse voice, no sore throat, no stridor, no tooth decay, no water brash or no wheezing. This is a recurrent problem. The current episode started more than 1 month ago. The problem occurs frequently. The problem has been gradually worsening. The heartburn is located in the substernum and abdomen. The heartburn is of moderate intensity. The heartburn does not wake her from sleep. The heartburn does not limit her activity. The heartburn doesn't change with position. The symptoms are aggravated by certain foods. Pertinent negatives include no anemia, fatigue, melena, muscle weakness, orthopnea or weight loss. Risk factors include lack of exercise and obesity. She has tried a PPI for the symptoms. The treatment provided mild relief.     Review of Systems   Constitutional:  Negative for appetite change, chills, diaphoresis, fatigue, fever and weight loss.   HENT:  Negative for congestion, ear pain, hoarse voice, postnasal drip, sneezing, sore throat and trouble swallowing.    Eyes:  Negative for pain and visual disturbance.   Respiratory:  Negative for cough, choking, chest tightness, shortness of breath and wheezing.    Cardiovascular:  Negative for chest pain, palpitations and leg swelling.  "  Gastrointestinal:  Positive for abdominal pain, heartburn, nausea and vomiting. Negative for constipation, diarrhea, dysphagia and melena.   Endocrine: Negative for cold intolerance and heat intolerance.   Genitourinary:  Negative for difficulty urinating, dysuria, flank pain, frequency and urgency.   Musculoskeletal:  Negative for arthralgias, back pain, gait problem, myalgias, muscle weakness and neck pain.   Neurological:  Negative for dizziness, tremors, seizures, syncope, speech difficulty, weakness, light-headedness, numbness and headaches.   Psychiatric/Behavioral:  Negative for confusion, decreased concentration, dysphoric mood, self-injury and suicidal ideas. The patient is not nervous/anxious.        Objective   /88   Pulse 86   Temp 97.7 °F (36.5 °C)   Ht 5' 5\" (1.651 m)   Wt 88 kg (194 lb)   SpO2 98%   BMI 32.28 kg/m²      Physical Exam  Vitals and nursing note reviewed.   Constitutional:       Appearance: Normal appearance. She is obese.   HENT:      Head: Normocephalic and atraumatic.      Right Ear: Tympanic membrane, ear canal and external ear normal.      Left Ear: Tympanic membrane, ear canal and external ear normal.      Nose: Nose normal.      Mouth/Throat:      Mouth: Mucous membranes are moist.      Pharynx: Oropharynx is clear.   Eyes:      Extraocular Movements: Extraocular movements intact.      Conjunctiva/sclera: Conjunctivae normal.   Neck:      Thyroid: No thyromegaly.      Vascular: No carotid bruit.   Cardiovascular:      Rate and Rhythm: Normal rate and regular rhythm.      Pulses: Normal pulses.      Heart sounds: Normal heart sounds.   Pulmonary:      Effort: Pulmonary effort is normal.      Breath sounds: Normal breath sounds.   Abdominal:      General: Abdomen is flat. Bowel sounds are normal.      Palpations: Abdomen is soft. There is no hepatomegaly, splenomegaly or mass.      Tenderness: There is no abdominal tenderness. There is no right CVA tenderness or left " CVA tenderness.   Musculoskeletal:         General: Normal range of motion.      Cervical back: Normal range of motion and neck supple.      Right lower leg: No edema.      Left lower leg: No edema.   Lymphadenopathy:      Cervical: No cervical adenopathy.   Skin:     General: Skin is warm and dry.   Neurological:      General: No focal deficit present.      Mental Status: She is alert and oriented to person, place, and time.   Psychiatric:         Mood and Affect: Mood normal.         Behavior: Behavior normal.         Thought Content: Thought content normal.         Judgment: Judgment normal.

## 2025-01-15 NOTE — ASSESSMENT & PLAN NOTE
Worsening  Take daily for 2 months  Orders:  •  omeprazole (PriLOSEC) 40 MG capsule; Take 1 capsule (40 mg total) by mouth daily before breakfast

## 2025-01-21 ENCOUNTER — TELEPHONE (OUTPATIENT)
Age: 70
End: 2025-01-21

## 2025-01-21 NOTE — TELEPHONE ENCOUNTER
Patient called in to reschedule her appointment for 1/22/25 with Mara at 9:00 AM. She is rescheduled for 1/29/25 at 8:30 AM as she is not feeling well.

## 2025-01-30 DIAGNOSIS — E78.2 HYPERLIPIDEMIA, MIXED: ICD-10-CM

## 2025-01-30 RX ORDER — ROSUVASTATIN CALCIUM 5 MG/1
5 TABLET, COATED ORAL DAILY
Qty: 30 TABLET | Refills: 0 | Status: SHIPPED | OUTPATIENT
Start: 2025-01-30

## 2025-02-19 ENCOUNTER — OFFICE VISIT (OUTPATIENT)
Dept: FAMILY MEDICINE CLINIC | Facility: CLINIC | Age: 70
End: 2025-02-19
Payer: MEDICARE

## 2025-02-19 VITALS
HEIGHT: 65 IN | DIASTOLIC BLOOD PRESSURE: 78 MMHG | SYSTOLIC BLOOD PRESSURE: 122 MMHG | OXYGEN SATURATION: 98 % | RESPIRATION RATE: 18 BRPM | HEART RATE: 77 BPM | WEIGHT: 194 LBS | BODY MASS INDEX: 32.32 KG/M2

## 2025-02-19 DIAGNOSIS — J01.40 ACUTE PANSINUSITIS, RECURRENCE NOT SPECIFIED: Primary | ICD-10-CM

## 2025-02-19 DIAGNOSIS — B34.9 VIRAL ILLNESS: ICD-10-CM

## 2025-02-19 DIAGNOSIS — R05.1 ACUTE COUGH: ICD-10-CM

## 2025-02-19 LAB
SARS-COV-2 AG UPPER RESP QL IA: NEGATIVE
SL AMB POCT RAPID FLU A: NEGATIVE
SL AMB POCT RAPID FLU B: NEGATIVE
VALID CONTROL: NORMAL

## 2025-02-19 PROCEDURE — G2211 COMPLEX E/M VISIT ADD ON: HCPCS | Performed by: PHYSICIAN ASSISTANT

## 2025-02-19 PROCEDURE — 99213 OFFICE O/P EST LOW 20 MIN: CPT | Performed by: PHYSICIAN ASSISTANT

## 2025-02-19 PROCEDURE — 87804 INFLUENZA ASSAY W/OPTIC: CPT | Performed by: PHYSICIAN ASSISTANT

## 2025-02-19 PROCEDURE — 87811 SARS-COV-2 COVID19 W/OPTIC: CPT | Performed by: PHYSICIAN ASSISTANT

## 2025-02-19 RX ORDER — AZITHROMYCIN 250 MG/1
TABLET, FILM COATED ORAL
Qty: 6 TABLET | Refills: 0 | Status: SHIPPED | OUTPATIENT
Start: 2025-02-19 | End: 2025-02-23

## 2025-02-19 RX ORDER — DEXTROMETHORPHAN HYDROBROMIDE AND PROMETHAZINE HYDROCHLORIDE 15; 6.25 MG/5ML; MG/5ML
5 SYRUP ORAL 4 TIMES DAILY PRN
Qty: 118 ML | Refills: 1 | Status: SHIPPED | OUTPATIENT
Start: 2025-02-19

## 2025-02-19 NOTE — ASSESSMENT & PLAN NOTE
Orders:  •  promethazine-dextromethorphan (PHENERGAN-DM) 6.25-15 mg/5 mL oral syrup; Take 5 mL by mouth 4 (four) times a day as needed for cough

## 2025-02-19 NOTE — PROGRESS NOTES
Name: Claudia Fulton      : 1955      MRN: [id number]  Encounter Provider: Maida Hitchcock PA-C  Encounter Date: 2025   Encounter department: [facility] [address]  :  Assessment & Plan  Acute pansinusitis, recurrence not specified    Orders:  •  azithromycin (ZITHROMAX) 250 mg tablet; Take 2 tablets today then 1 tablet daily x 4 days    Acute cough    Orders:  •  promethazine-dextromethorphan (PHENERGAN-DM) 6.25-15 mg/5 mL oral syrup; Take 5 mL by mouth 4 (four) times a day as needed for cough    Viral illness    Orders:  •  POCT rapid flu A and B  •  POCT Rapid Covid Ag    Results for orders placed or performed in visit on 25   POCT rapid flu A and B   Result Value Ref Range    RAPID FLU A negative     RAPID FLU B negative    POCT Rapid Covid Ag   Result Value Ref Range    POCT SARS-CoV-2 Ag Negative Negative    VALID CONTROL Valid              History of Present Illness   Cough  This is a new problem. The current episode started in the past 7 days. The problem has been gradually worsening. The problem occurs constantly. The cough is Productive of sputum. Associated symptoms include chills, a fever, headaches, nasal congestion, postnasal drip, rhinorrhea and a sore throat. Pertinent negatives include no chest pain, ear congestion, ear pain, heartburn, hemoptysis, myalgias, rash, shortness of breath, sweats, weight loss or wheezing. Nothing aggravates the symptoms. She has tried OTC cough suppressant for the symptoms. The treatment provided no relief.     Review of Systems   Constitutional:  Positive for chills and fever. Negative for weight loss.   HENT:  Positive for congestion, postnasal drip, rhinorrhea and sore throat. Negative for ear pain.    Respiratory:  Positive for cough. Negative for hemoptysis, chest tightness, shortness of breath and wheezing.    Cardiovascular:  Negative for chest pain.   Gastrointestinal:  Negative for heartburn.  "  Musculoskeletal:  Negative for myalgias.   Skin:  Negative for rash.   Neurological:  Positive for headaches.       Objective   /78 (BP Location: Left arm, Patient Position: Sitting, Cuff Size: Large)   Pulse 77   Resp 18   Ht 5' 5\" (1.651 m)   Wt 88 kg (194 lb)   SpO2 98%   BMI 32.28 kg/m²      Physical Exam  Constitutional:       Appearance: She is ill-appearing.   HENT:      Right Ear: Tympanic membrane, ear canal and external ear normal.      Left Ear: Tympanic membrane, ear canal and external ear normal.      Nose: Congestion present.      Right Sinus: Frontal sinus tenderness present. No maxillary sinus tenderness.      Left Sinus: Frontal sinus tenderness present. No maxillary sinus tenderness.      Mouth/Throat:      Mouth: Mucous membranes are moist.      Pharynx: Oropharynx is clear. Posterior oropharyngeal erythema present. No oropharyngeal exudate.   Eyes:      Conjunctiva/sclera: Conjunctivae normal.   Cardiovascular:      Rate and Rhythm: Normal rate and regular rhythm.      Heart sounds: Normal heart sounds.   Pulmonary:      Effort: Pulmonary effort is normal.      Breath sounds: Normal breath sounds.   Neurological:      Mental Status: She is alert and oriented to person, place, and time.   Psychiatric:         Mood and Affect: Mood normal.         Behavior: Behavior normal.         "

## 2025-02-19 NOTE — ASSESSMENT & PLAN NOTE
Orders:  •  POCT rapid flu A and B  •  POCT Rapid Covid Ag    Results for orders placed or performed in visit on 02/19/25   POCT rapid flu A and B   Result Value Ref Range    RAPID FLU A negative     RAPID FLU B negative    POCT Rapid Covid Ag   Result Value Ref Range    POCT SARS-CoV-2 Ag Negative Negative    VALID CONTROL Valid

## 2025-02-24 ENCOUNTER — TELEPHONE (OUTPATIENT)
Age: 70
End: 2025-02-24

## 2025-02-24 DIAGNOSIS — J06.9 ACUTE URI: Primary | ICD-10-CM

## 2025-02-24 NOTE — TELEPHONE ENCOUNTER
Pt seen last week and prescribed a z-pack and cough medication. States she has finished all the antibiotic and is no better than when seen.    Pt asking for recommendations. Please advise

## 2025-03-02 DIAGNOSIS — E78.2 HYPERLIPIDEMIA, MIXED: ICD-10-CM

## 2025-03-03 RX ORDER — ROSUVASTATIN CALCIUM 5 MG/1
5 TABLET, COATED ORAL DAILY
Qty: 90 TABLET | Refills: 1 | Status: SHIPPED | OUTPATIENT
Start: 2025-03-03

## 2025-03-17 ENCOUNTER — OFFICE VISIT (OUTPATIENT)
Age: 70
End: 2025-03-17
Payer: MEDICARE

## 2025-03-17 VITALS
TEMPERATURE: 98.2 F | WEIGHT: 191 LBS | SYSTOLIC BLOOD PRESSURE: 130 MMHG | DIASTOLIC BLOOD PRESSURE: 66 MMHG | RESPIRATION RATE: 18 BRPM | BODY MASS INDEX: 31.78 KG/M2 | OXYGEN SATURATION: 100 % | HEART RATE: 78 BPM

## 2025-03-17 DIAGNOSIS — J02.0 STREP PHARYNGITIS: Primary | ICD-10-CM

## 2025-03-17 LAB — S PYO AG THROAT QL: POSITIVE

## 2025-03-17 PROCEDURE — 87880 STREP A ASSAY W/OPTIC: CPT | Performed by: PHYSICIAN ASSISTANT

## 2025-03-17 PROCEDURE — 99213 OFFICE O/P EST LOW 20 MIN: CPT | Performed by: PHYSICIAN ASSISTANT

## 2025-03-17 PROCEDURE — G0463 HOSPITAL OUTPT CLINIC VISIT: HCPCS | Performed by: PHYSICIAN ASSISTANT

## 2025-03-17 RX ORDER — AMOXICILLIN 875 MG/1
875 TABLET, COATED ORAL 2 TIMES DAILY
Qty: 20 TABLET | Refills: 0 | Status: SHIPPED | OUTPATIENT
Start: 2025-03-17 | End: 2025-03-27

## 2025-03-17 NOTE — PATIENT INSTRUCTIONS
New Medications Ordered This Visit   Medications    amoxicillin (AMOXIL) 875 mg tablet     Sig: Take 1 tablet (875 mg total) by mouth 2 (two) times a day for 10 days     Dispense:  20 tablet     Refill:  0       Rest. Increase clear fluids.   Take tylenol and motrin for pain as needed.   Use honey, hot tea, cough drops, throat spray as needed for throat pain.   May gargle with warm salt water several times daily for comfort.   If a throat culture was sent out, we will contact you if it comes back positive for changes treatment.   Please see your PCP for recheck in one week if no better.   Return or go to the ER with any worsening symptoms including voice changes, dehydration, new fevers.

## 2025-03-17 NOTE — PROGRESS NOTES
[facility]        NAME: Claudia Fulton is a 70 y.o. female  : 1955    MRN: [id number]  DATE: 2025  TIME: 11:06 AM    Assessment and Plan   Strep pharyngitis [J02.0]  1. Strep pharyngitis  POCT rapid strepA    amoxicillin (AMOXIL) 875 mg tablet        Results for orders placed or performed in visit on 25   POCT rapid strepA   Result Value Ref Range     RAPID STREP A Positive (A) Negative         Patient Instructions     Patient Instructions     New Medications Ordered This Visit   Medications    amoxicillin (AMOXIL) 875 mg tablet     Sig: Take 1 tablet (875 mg total) by mouth 2 (two) times a day for 10 days     Dispense:  20 tablet     Refill:  0       Rest. Increase clear fluids.   Take tylenol and motrin for pain as needed.   Use honey, hot tea, cough drops, throat spray as needed for throat pain.   May gargle with warm salt water several times daily for comfort.   If a throat culture was sent out, we will contact you if it comes back positive for changes treatment.   Please see your PCP for recheck in one week if no better.   Return or go to the ER with any worsening symptoms including voice changes, dehydration, new fevers.        Chief Complaint     Chief Complaint   Patient presents with    Sore Throat     Patient states she has had a sore throat for 4 weeks, patient states she took a zpack and it still came back, patient states when the meds are over she starts feeling sick again.          History of Present Illness       Sore Throat   This is a recurrent problem. The current episode started in the past 7 days. The problem has been rapidly worsening. Neither side of throat is experiencing more pain than the other. There has been no fever. The pain is at a severity of 6/10. The pain is moderate. Associated symptoms include coughing, ear pain, headaches, a plugged ear sensation and swollen glands. She has had exposure to strep.     She presents for 2 to 3 days sore  throat.  Pain is bilateral, worse with swallowing, constant, severe.  She feels tenderness and swelling in the anterior cervical lymph nodes.  Associated with some congestion, left ear pain, and productive cough.  She has chills, headache, body aches, fatigue.  Tolerating p.o., able to swallow saliva, no vomiting, diarrhea.  No difficulty breathing.  Sick contact with strep.  Less than 1 month ago was treated for respiratory infection with azithromycin and then Augmentin, felt mostly better before symptoms returned.  Using [place] with some relief.    Review of Systems   Review of Systems   HENT:  Positive for ear pain and sore throat.    Respiratory:  Positive for cough.    Neurological:  Positive for headaches.     As per HPI    Current Medications       Current Outpatient Medications:     amoxicillin (AMOXIL) 875 mg tablet, Take 1 tablet (875 mg total) by mouth 2 (two) times a day for 10 days, Disp: 20 tablet, Rfl: 0    Amphet-Dextroamphet 3-Bead ER 50 MG CP24, take 1 capsule by mouth every day in the morning, Disp: , Rfl:     busPIRone (BUSPAR) 10 mg tablet, 1 TABLET(S) 2 TIMES A DAY, ORAL ROUTE AT 8 AM AND 6 PM, Disp: , Rfl:     cholecalciferol (VITAMIN D3) 1,000 units tablet, Take 1,000 Units by mouth daily, Disp: , Rfl:     Cyanocobalamin (VITAMIN B 12 PO), Take by mouth, Disp: , Rfl:     desvenlafaxine (PRISTIQ) 100 mg 24 hr tablet, Take 100 mg by mouth daily , Disp: , Rfl:     estradiol (ESTRACE VAGINAL) 0.1 mg/g vaginal cream, Insert 1 g into the vagina 2 (two) times a week, Disp: 42.5 g, Rfl: 0    levothyroxine 75 mcg tablet, TAKE 1 TABLET BY MOUTH EVERY DAY, Disp: 90 tablet, Rfl: 1    magnesium gluconate (MAGONATE) 500 mg tablet, Take 500 mg by mouth 2 (two) times a day, Disp: , Rfl:     omeprazole (PriLOSEC) 40 MG capsule, Take 1 capsule (40 mg total) by mouth daily before breakfast, Disp: 90 capsule, Rfl: 1    promethazine-dextromethorphan (PHENERGAN-DM) 6.25-15 mg/5 mL oral syrup, Take 5 mL by  mouth 4 (four) times a day as needed for cough, Disp: 118 mL, Rfl: 1    rosuvastatin (CRESTOR) 5 mg tablet, TAKE 1 TABLET (5 MG TOTAL) BY MOUTH DAILY., Disp: 90 tablet, Rfl: 1    Current Allergies     Allergies as of 2025    (No Known Allergies)            The following portions of the patient's history were reviewed and updated as appropriate: allergies, current medications, past family history, past medical history, past social history, past surgical history and problem list.     Past Medical History:   Diagnosis Date    Abnormal ECG     Anxiety     Chronic kidney disease     renal sac- pt denied.    Colon polyp     Depression     Disease of thyroid gland     Dysuria     LAST ASSESSED [id number]    Endometriosis     Gout     Headache(784.0) 2021    Periodic    History of fractured kneecap     hairline fracture right knee due to accident     Obesity 2021    Gained 40 lbs since Covid       Past Surgical History:   Procedure Laterality Date    COLONOSCOPY      INGUINAL HERNIA REPAIR      LAST ASSESSED [id number]    OOPHORECTOMY  2004    thinks right side    OTHER SURGICAL HISTORY      FACE SURGERY  -- LAST ASSESSED [id number]    OTHER SURGICAL HISTORY      stiches in chin due to accident     OH COLONOSCOPY FLX DX W/COLLJ SPEC WHEN PFRMD N/A 2018    Procedure: EGD AND COLONOSCOPY;  Surgeon: Can Davis MD;  Location: [facility];  Service: Gastroenterology       Family History   Problem Relation Age of Onset    Atrial fibrillation Mother     Depression Mother     Dementia Mother     Heart attack Mother          in     Alcohol abuse Father     Heart attack Father     Schizophrenia Sister     Heart attack Sister     No Known Problems Daughter     No Known Problems Daughter     Heart attack Maternal Grandfather     Dementia Maternal Grandfather         Heart disease    Melanoma Paternal Grandmother     Dementia Paternal Grandmother         Skin cancer    Heart attack Paternal  Grandfather     No Known Problems Maternal Aunt     No Known Problems Maternal Aunt     No Known Problems Paternal Aunt     Breast cancer Neg Hx          Medications have been verified.        Objective   /66   Pulse 78   Temp 98.2 °F (36.8 °C)   Resp 18   Wt 86.6 kg (191 lb)   SpO2 100%   BMI 31.78 kg/m²        Physical Exam     Physical Exam  Vitals and nursing note reviewed.   Constitutional:       General: She is not in acute distress.     Appearance: Normal appearance. She is not ill-appearing or toxic-appearing.   HENT:      Head: Normocephalic and atraumatic.      Right Ear: Tympanic membrane, ear canal and external ear normal.      Left Ear: Tympanic membrane, ear canal and external ear normal.      Nose: Congestion present. No mucosal edema or rhinorrhea.      Right Sinus: No maxillary sinus tenderness or frontal sinus tenderness.      Left Sinus: No maxillary sinus tenderness or frontal sinus tenderness.      Mouth/Throat:      Lips: Pink.      Mouth: Mucous membranes are moist.      Pharynx: Uvula midline. Pharyngeal swelling, oropharyngeal exudate and posterior oropharyngeal erythema present. No postnasal drip.      Tonsils: No tonsillar abscesses.   Eyes:      Extraocular Movements: Extraocular movements intact.      Conjunctiva/sclera: Conjunctivae normal.      Pupils: Pupils are equal, round, and reactive to light.   Cardiovascular:      Rate and Rhythm: Normal rate and regular rhythm.      Heart sounds: Normal heart sounds.   Pulmonary:      Effort: Pulmonary effort is normal. No respiratory distress.      Breath sounds: Normal breath sounds. No rhonchi or rales.   Chest:      Chest wall: No tenderness.   Abdominal:      General: There is no distension.      Palpations: Abdomen is soft.   Musculoskeletal:         General: Normal range of motion.      Cervical back: Normal range of motion and neck supple. No tenderness.   Lymphadenopathy:      Cervical: Cervical adenopathy present.    Skin:     General: Skin is warm.      Capillary Refill: Capillary refill takes less than 2 seconds.      Findings: No rash.   Neurological:      General: No focal deficit present.      Mental Status: She is alert and oriented to person, place, and time.   Psychiatric:         Mood and Affect: Mood normal.         Behavior: Behavior normal.

## 2025-03-21 PROBLEM — J01.40 ACUTE PANSINUSITIS: Status: RESOLVED | Noted: 2025-02-19 | Resolved: 2025-03-21

## 2025-03-21 PROBLEM — R05.1 ACUTE COUGH: Status: RESOLVED | Noted: 2025-02-19 | Resolved: 2025-03-21

## 2025-04-02 ENCOUNTER — OFFICE VISIT (OUTPATIENT)
Dept: FAMILY MEDICINE CLINIC | Facility: CLINIC | Age: 70
End: 2025-04-02
Payer: MEDICARE

## 2025-04-02 ENCOUNTER — RESULTS FOLLOW-UP (OUTPATIENT)
Dept: FAMILY MEDICINE CLINIC | Facility: CLINIC | Age: 70
End: 2025-04-02

## 2025-04-02 ENCOUNTER — HOSPITAL ENCOUNTER (OUTPATIENT)
Dept: RADIOLOGY | Facility: HOSPITAL | Age: 70
Discharge: HOME | End: 2025-04-02
Payer: MEDICARE

## 2025-04-02 VITALS
OXYGEN SATURATION: 98 % | WEIGHT: 192 LBS | SYSTOLIC BLOOD PRESSURE: 124 MMHG | HEART RATE: 72 BPM | DIASTOLIC BLOOD PRESSURE: 80 MMHG | BODY MASS INDEX: 31.99 KG/M2 | HEIGHT: 65 IN

## 2025-04-02 DIAGNOSIS — G89.29 CHRONIC PAIN OF LEFT THUMB: ICD-10-CM

## 2025-04-02 DIAGNOSIS — Z23 ENCOUNTER FOR IMMUNIZATION: ICD-10-CM

## 2025-04-02 DIAGNOSIS — M18.12 ARTHRITIS OF CARPOMETACARPAL (CMC) JOINT OF LEFT THUMB: Primary | ICD-10-CM

## 2025-04-02 DIAGNOSIS — G89.29 CHRONIC PAIN OF LEFT THUMB: Primary | ICD-10-CM

## 2025-04-02 DIAGNOSIS — M79.645 CHRONIC PAIN OF LEFT THUMB: Primary | ICD-10-CM

## 2025-04-02 DIAGNOSIS — M79.645 CHRONIC PAIN OF LEFT THUMB: ICD-10-CM

## 2025-04-02 PROBLEM — B34.9 VIRAL ILLNESS: Status: RESOLVED | Noted: 2025-02-19 | Resolved: 2025-04-02

## 2025-04-02 PROCEDURE — 90677 PCV20 VACCINE IM: CPT

## 2025-04-02 PROCEDURE — G0009 ADMIN PNEUMOCOCCAL VACCINE: HCPCS

## 2025-04-02 PROCEDURE — 73130 X-RAY EXAM OF HAND: CPT

## 2025-04-02 PROCEDURE — G2211 COMPLEX E/M VISIT ADD ON: HCPCS | Performed by: PHYSICIAN ASSISTANT

## 2025-04-02 PROCEDURE — 99213 OFFICE O/P EST LOW 20 MIN: CPT | Performed by: PHYSICIAN ASSISTANT

## 2025-04-02 NOTE — PROGRESS NOTES
"Name: Claudia Fulton      : 1955      MRN: [id number]  Encounter Provider: Maida Hitchcock PA-C  Encounter Date: 2025   Encounter department: [facility] [address]  :  Assessment & Plan  Chronic pain of left thumb  Pain is worsening and now awakens her at night  Using OTC wrist splint   Orders:  •  XR hand 3+ vw left; Future    Encounter for immunization    Orders:  •  Pneumococcal Conjugate Vaccine 20-valent (Pcv20)           History of Present Illness   Pt with worsening pain in left hand/wrist. She has a OTC brace she wears with slight relief. She is now haivng pain while sleeping that awakens her. She denies any tingling or numbness.       Review of Systems   Musculoskeletal:  Positive for arthralgias.       Objective   /80   Pulse 72   Ht 5' 5\" (1.651 m)   Wt 87.1 kg (192 lb)   SpO2 98%   BMI 31.95 kg/m²      Physical Exam  Vitals and nursing note reviewed.   Constitutional:       Appearance: Normal appearance.   Cardiovascular:      Rate and Rhythm: Normal rate.   Pulmonary:      Effort: Pulmonary effort is normal.   Musculoskeletal:      Right hand: Normal.      Left hand: Swelling, tenderness and bony tenderness present. Decreased range of motion. Normal sensation. Normal capillary refill. Normal pulse.        Arms:    Neurological:      Mental Status: She is alert and oriented to person, place, and time.   Psychiatric:         Mood and Affect: Mood normal.         Behavior: Behavior normal.         "

## 2025-06-23 ENCOUNTER — HOSPITAL ENCOUNTER (OUTPATIENT)
Dept: MAMMOGRAPHY | Facility: CLINIC | Age: 70
Discharge: HOME | End: 2025-06-23
Payer: MEDICARE

## 2025-06-23 VITALS — HEIGHT: 65 IN | BODY MASS INDEX: 31.99 KG/M2 | WEIGHT: 192 LBS

## 2025-06-23 DIAGNOSIS — Z12.31 OTHER SCREENING MAMMOGRAM: ICD-10-CM

## 2025-06-23 PROCEDURE — 77067 SCR MAMMO BI INCL CAD: CPT

## 2025-06-23 PROCEDURE — 77063 BREAST TOMOSYNTHESIS BI: CPT

## 2025-06-30 DIAGNOSIS — K21.9 GASTROESOPHAGEAL REFLUX DISEASE: ICD-10-CM

## 2025-07-01 RX ORDER — OMEPRAZOLE 40 MG/1
40 CAPSULE, DELAYED RELEASE ORAL
Qty: 90 CAPSULE | Refills: 1 | Status: SHIPPED | OUTPATIENT
Start: 2025-07-01

## 2025-07-22 ENCOUNTER — OFFICE VISIT (OUTPATIENT)
Dept: FAMILY MEDICINE CLINIC | Facility: CLINIC | Age: 70
End: 2025-07-22
Payer: MEDICARE

## 2025-07-22 ENCOUNTER — TELEPHONE (OUTPATIENT)
Age: 70
End: 2025-07-22

## 2025-07-22 VITALS
BODY MASS INDEX: 32.49 KG/M2 | HEIGHT: 65 IN | OXYGEN SATURATION: 97 % | DIASTOLIC BLOOD PRESSURE: 80 MMHG | SYSTOLIC BLOOD PRESSURE: 138 MMHG | TEMPERATURE: 98.5 F | WEIGHT: 195 LBS | HEART RATE: 85 BPM

## 2025-07-22 DIAGNOSIS — E78.2 HYPERLIPIDEMIA, MIXED: ICD-10-CM

## 2025-07-22 DIAGNOSIS — F41.9 ANXIETY: ICD-10-CM

## 2025-07-22 DIAGNOSIS — Z00.00 MEDICARE ANNUAL WELLNESS VISIT, SUBSEQUENT: Primary | ICD-10-CM

## 2025-07-22 DIAGNOSIS — E03.9 HYPOTHYROIDISM, UNSPECIFIED TYPE: ICD-10-CM

## 2025-07-22 DIAGNOSIS — E03.9 ACQUIRED HYPOTHYROIDISM: ICD-10-CM

## 2025-07-22 DIAGNOSIS — L98.9 LESION OF SKIN OF SCALP: ICD-10-CM

## 2025-07-22 PROCEDURE — G0439 PPPS, SUBSEQ VISIT: HCPCS | Performed by: PHYSICIAN ASSISTANT

## 2025-07-22 RX ORDER — ALPRAZOLAM 0.5 MG
TABLET ORAL
Refills: 0 | Status: CANCELLED | OUTPATIENT
Start: 2025-07-22

## 2025-07-22 RX ORDER — ALPRAZOLAM 0.5 MG
0.5 TABLET ORAL 2 TIMES DAILY PRN
Qty: 10 TABLET | Refills: 0 | Status: SHIPPED | OUTPATIENT
Start: 2025-07-22

## 2025-07-22 NOTE — PROGRESS NOTES
Name: Claudia Fulton      : 1955      MRN: [id number]  Encounter Provider: Maida Hitchcock PA-C  Encounter Date: 2025   Encounter department: [facility] [address]  :  Assessment & Plan  Medicare annual wellness visit, subsequent  BMI Counseling: Body mass index is 32.45 kg/m². The BMI is above normal. Nutrition recommendations include reducing portion sizes, decreasing overall calorie intake, 3-5 servings of fruits/vegetables daily, reducing fast food intake, consuming healthier snacks, decreasing soda and/or juice intake, moderation in carbohydrate intake, increasing intake of lean protein, reducing intake of saturated fat and trans fat, and reducing intake of cholesterol. Exercise recommendations include exercising 3-5 times per week.        Acquired hypothyroidism    Orders:  •  CBC and differential; Future  •  Comprehensive metabolic panel; Future  •  Lipid panel; Future  •  TSH, 3rd generation; Future  •  T4, free; Future    Hyperlipidemia, mixed    Orders:  •  CBC and differential; Future  •  Comprehensive metabolic panel; Future  •  Lipid panel; Future  •  TSH, 3rd generation; Future  •  T4, free; Future    Lesion of skin of scalp    Orders:  •  Ambulatory Referral to Dermatology; Future    Anxiety    Orders:  •  ALPRAZolam (XANAX) 0.5 mg tablet; Take 1 tablet (0.5 mg total) by mouth 2 (two) times a day as needed for anxiety    BMI Counseling: Body mass index is 32.45 kg/m². The BMI is above normal. Exercise recommendations include exercising 3-5 times per week.     Depression Screening and Follow-up Plan:   Patient with underlying depression and was advised to continue current medications as prescribed.       Preventive health issues were discussed with patient, and age appropriate screening tests were ordered as noted in patient's After Visit Summary. Personalized health advice and appropriate referrals for health education or preventive  services given if needed, as noted in patient's After Visit Summary.    History of Present Illness     Pt is here for her Medicare AWV. She has a skin lesion of her scalp that her hairdresser saw. She has a family history of melanoma so she would like to see dermatology for this. She is traveling to [place] soon and would like medication to help with her anxiety while flying.        Patient Care Team:  Maida Hitchcock PA-C as PCP - General (Family Medicine)  Can Davis MD as Endoscopist    Review of Systems   Constitutional:  Negative for chills, diaphoresis and fatigue.   HENT:  Negative for congestion, ear pain, mouth sores, nosebleeds, sneezing, sore throat and trouble swallowing.    Eyes:  Positive for itching. Negative for redness.   Respiratory:  Negative for chest tightness and shortness of breath.    Cardiovascular:  Negative for chest pain, palpitations and leg swelling.   Gastrointestinal:  Negative for abdominal pain, constipation, diarrhea and nausea.   Endocrine: Negative for polydipsia, polyphagia and polyuria.   Genitourinary:  Negative for difficulty urinating, dysuria and flank pain.   Musculoskeletal:  Negative for arthralgias, back pain, gait problem, joint swelling, myalgias, neck pain and neck stiffness.   Skin:  Negative for rash.   Neurological:  Negative for dizziness, seizures, syncope, speech difficulty, light-headedness, numbness and headaches.   Psychiatric/Behavioral:  Negative for behavioral problems, decreased concentration, dysphoric mood, hallucinations, self-injury, sleep disturbance and suicidal ideas. The patient is not nervous/anxious.      Medical History Reviewed by provider this encounter:       Annual Wellness Visit Questionnaire   Claudia is here for her Subsequent Wellness visit. Last Medicare Wellness visit information reviewed, patient interviewed, no change since last AWV.     Health Risk Assessment:   Patient rates overall health as fair. Patient feels  that their physical health rating is same. Patient is dissatisfied with their life. Eyesight was rated as slightly worse. Hearing was rated as slightly worse. Patient feels that their emotional and mental health rating is same. Patients states they are sometimes angry. Patient states they are always unusually tired/fatigued. Pain experienced in the last 7 days has been some. Patient's pain rating has been 5/10. Patient states that she has experienced weight loss or gain in last 6 months.     Depression Screening:   PHQ-9 Score: 21      Fall Risk Screening:   In the past year, patient has experienced: history of falling in past year      Urinary Incontinence Screening:   Patient has not leaked urine accidently in the last six months.     Home Safety:  Patient does not have trouble with stairs inside or outside of their home. Patient has working smoke alarms and has no working carbon monoxide detector. Home safety hazards include: none.     Nutrition:   Current diet is Regular.     Medications:   Patient is currently taking over-the-counter supplements. OTC medications include: see medication list. Patient is able to manage medications.     Activities of Daily Living (ADLs)/Instrumental Activities of Daily Living (IADLs):   Walk and transfer into and out of bed and chair?: Yes  Dress and groom yourself?: Yes    Bathe or shower yourself?: Yes    Feed yourself? Yes  Do your laundry/housekeeping?: Yes  Manage your money, pay your bills and track your expenses?: Yes  Make your own meals?: Yes    Do your own shopping?: Yes    Previous Hospitalizations:   Any hospitalizations or ED visits within the last 12 months?: No      Advance Care Planning:   Living will: Yes    Durable POA for healthcare: Yes    Advanced directive: Yes      Preventive Screenings      Cardiovascular Screening:    General: Screening Not Indicated and History Lipid Disorder      Colorectal Cancer Screening:     General: Screening Current      Breast  Cancer Screening:     General: Screening Current      Cervical Cancer Screening:    General: Screening Not Indicated      Lung Cancer Screening:     General: Screening Not Indicated      Hepatitis C Screening:    General: Screening Current    Screening, Brief Intervention, and Referral to Treatment (SBIRT)     Screening  Typical number of drinks in a day: 0  Typical number of drinks in a week: 0  Interpretation: Low risk drinking behavior.    AUDIT-C Screenin) How often did you have a drink containing alcohol in the past year? never  2) How many drinks did you have on a typical day when you were drinking in the past year? 0  3) How often did you have 6 or more drinks on one occasion in the past year? never    AUDIT-C Score: 0  Interpretation: Score 0-2 (female): Negative screen for alcohol misuse    Single Item Drug Screening:  How often have you used an illegal drug (including marijuana) or a prescription medication for non-medical reasons in the past year? never    Single Item Drug Screen Score: 0  Interpretation: Negative screen for possible drug use disorder    Social Drivers of Health     Financial Resource Strain: Low Risk  (2023)    Overall Financial Resource Strain (CARDIA)    • Difficulty of Paying Living Expenses: Not hard at all   Food Insecurity: No Food Insecurity (2025)    Nursing - Inadequate Food Risk Classification    • Worried About Running Out of Food in the Last Year: Never true    • Ran Out of Food in the Last Year: Never true   Transportation Needs: No Transportation Needs (2025)    PRAPARE - Transportation    • Lack of Transportation (Medical): No    • Lack of Transportation (Non-Medical): No   Housing Stability: Low Risk  (2025)    Housing Stability Vital Sign    • Unable to Pay for Housing in the Last Year: No    • Number of Times Moved in the Last Year: 0    • Homeless in the Last Year: No   Utilities: Not At Risk (2025)    [facility] Utilities    • Threatened with  "loss of utilities: No     No results found.    Objective   /80   Pulse 85   Temp 98.5 °F (36.9 °C)   Ht 5' 5\" (1.651 m)   Wt 88.5 kg (195 lb)   SpO2 97%   BMI 32.45 kg/m²     Physical Exam  Vitals and nursing note reviewed.   Constitutional:       Appearance: Normal appearance. She is obese.   HENT:      Head: Normocephalic and atraumatic.      Right Ear: Tympanic membrane, ear canal and external ear normal.      Left Ear: Tympanic membrane, ear canal and external ear normal.      Nose: Nose normal.      Mouth/Throat:      Mouth: Mucous membranes are moist.      Pharynx: Oropharynx is clear.     Eyes:      Extraocular Movements: Extraocular movements intact.      Conjunctiva/sclera: Conjunctivae normal.     Neck:      Thyroid: No thyromegaly.      Vascular: No carotid bruit.     Cardiovascular:      Rate and Rhythm: Normal rate and regular rhythm.      Pulses: Normal pulses.      Heart sounds: Normal heart sounds.   Pulmonary:      Effort: Pulmonary effort is normal.      Breath sounds: Normal breath sounds.   Abdominal:      General: Abdomen is flat. Bowel sounds are normal.      Palpations: Abdomen is soft. There is no hepatomegaly, splenomegaly or mass.      Tenderness: There is no abdominal tenderness. There is no right CVA tenderness or left CVA tenderness.     Musculoskeletal:         General: Normal range of motion.      Cervical back: Normal range of motion and neck supple.      Right lower leg: No edema.      Left lower leg: No edema.   Lymphadenopathy:      Cervical: No cervical adenopathy.     Skin:     General: Skin is warm and dry.     Neurological:      General: No focal deficit present.      Mental Status: She is alert and oriented to person, place, and time.     Psychiatric:         Mood and Affect: Mood normal.         Behavior: Behavior normal.         Thought Content: Thought content normal.         Judgment: Judgment normal.       Depression Screening Follow-up Plan: Patient's " depression screening was positive with a PHQ-9 score of 21. Patient with underlying depression and was advised to continue current medications as prescribed. Continue regular follow-up with their psychologist/therapist/psychiatrist who is managing their mental health condition(s).

## 2025-07-22 NOTE — PATIENT INSTRUCTIONS
Medicare Preventive Visit Patient Instructions  Thank you for completing your Welcome to Medicare Visit or Medicare Annual Wellness Visit today. Your next wellness visit will be due in one year (7/23/2026).  The screening/preventive services that you may require over the next 5-10 years are detailed below. Some tests may not apply to you based off risk factors and/or age. Screening tests ordered at today's visit but not completed yet may show as past due. Also, please note that scanned in results may not display below.  Preventive Screenings:  Service Recommendations Previous Testing/Comments   Colorectal Cancer Screening  * Colonoscopy    * Fecal Occult Blood Test (FOBT)/Fecal Immunochemical Test (FIT)  * Fecal DNA/Cologuard Test  * Flexible Sigmoidoscopy Age: 45-75 years old   Colonoscopy: every 10 years (may be performed more frequently if at higher risk)  OR  FOBT/FIT: every 1 year  OR  Cologuard: every 3 years  OR  Sigmoidoscopy: every 5 years  Screening may be recommended earlier than age 45 if at higher risk for colorectal cancer. Also, an individualized decision between you and your healthcare provider will decide whether screening between the ages of 76-85 would be appropriate. Colonoscopy: 01/13/2025  FOBT/FIT: Not on file  Cologuard: Not on file  Sigmoidoscopy: Not on file    Screening Current     Breast Cancer Screening Age: 40+ years old  Frequency: every 1-2 years  Not required if history of left and right mastectomy Mammogram: 06/23/2025    Screening Current   Cervical Cancer Screening Between the ages of 21-29, pap smear recommended once every 3 years.   Between the ages of 30-65, can perform pap smear with HPV co-testing every 5 years.   Recommendations may differ for women with a history of total hysterectomy, cervical cancer, or abnormal pap smears in past. Pap Smear: 09/01/2023    Screening Not Indicated   Hepatitis C Screening Once for adults born between 1945 and 1965  More frequently in  patients at high risk for Hepatitis C Hep C Antibody: 11/07/2019    Screening Current   Diabetes Screening 1-2 times per year if you're at risk for diabetes or have pre-diabetes Fasting glucose: 124 mg/dL (11/28/2023)  A1C: No results in last 5 years (No results in last 5 years)      Cholesterol Screening Once every 5 years if you don't have a lipid disorder. May order more often based on risk factors. Lipid panel: 11/28/2023    Screening Not Indicated  History Lipid Disorder     Other Preventive Screenings Covered by Medicare:  Abdominal Aortic Aneurysm (AAA) Screening: covered once if your at risk. You're considered to be at risk if you have a family history of AAA.  Lung Cancer Screening: covers low dose CT scan once per year if you meet all of the following conditions: (1) Age 55-77; (2) No signs or symptoms of lung cancer; (3) Current smoker or have quit smoking within the last 15 years; (4) You have a tobacco smoking history of at least 20 pack years (packs per day multiplied by number of years you smoked); (5) You get a written order from a healthcare provider.  Glaucoma Screening: covered annually if you're considered high risk: (1) You have diabetes OR (2) Family history of glaucoma OR (3)  aged 50 and older OR (4)  American aged 65 and older  Osteoporosis Screening: covered every 2 years if you meet one of the following conditions: (1) You're estrogen deficient and at risk for osteoporosis based off medical history and other findings; (2) Have a vertebral abnormality; (3) On glucocorticoid therapy for more than 3 months; (4) Have primary hyperparathyroidism; (5) On osteoporosis medications and need to assess response to drug therapy.   Last bone density test (DXA Scan): 12/05/2023.  HIV Screening: covered annually if you're between the age of 15-65. Also covered annually if you are younger than 15 and older than 65 with risk factors for HIV infection. For pregnant patients, it is  covered up to 3 times per pregnancy.    Immunizations:  Immunization Recommendations   Influenza Vaccine Annual influenza vaccination during flu season is recommended for all persons aged >= 6 months who do not have contraindications   Pneumococcal Vaccine   * Pneumococcal conjugate vaccine = PCV13 (Prevnar 13), PCV15 (Vaxneuvance), PCV20 (Prevnar 20)  * Pneumococcal polysaccharide vaccine = PPSV23 (Pneumovax) Adults 19-63 yo with certain risk factors or if 65+ yo  If never received any pneumonia vaccine: recommend Prevnar 20 (PCV20)  Give PCV20 if previously received 1 dose of PCV13 or PPSV23   Hepatitis B Vaccine 3 dose series if at intermediate or high risk (ex: diabetes, end stage renal disease, liver disease)   Respiratory syncytial virus (RSV) Vaccine - COVERED BY MEDICARE PART D  * RSVPreF3 (Arexvy) CDC recommends that adults 60 years of age and older may receive a single dose of RSV vaccine using shared clinical decision-making (SCDM)   Tetanus (Td) Vaccine - COST NOT COVERED BY MEDICARE PART B Following completion of primary series, a booster dose should be given every 10 years to maintain immunity against tetanus. Td may also be given as tetanus wound prophylaxis.   Tdap Vaccine - COST NOT COVERED BY MEDICARE PART B Recommended at least once for all adults. For pregnant patients, recommended with each pregnancy.   Shingles Vaccine (Shingrix) - COST NOT COVERED BY MEDICARE PART B  2 shot series recommended in those 19 years and older who have or will have weakened immune systems or those 50 years and older     Health Maintenance Due:      Topic Date Due   • Colorectal Cancer Screening  01/13/2026   • Breast Cancer Screening: Mammogram  06/23/2026   • Hepatitis C Screening  Completed     Immunizations Due:      Topic Date Due   • COVID-19 Vaccine (5 - 2024-25 season) 09/01/2024   • Influenza Vaccine (1) 09/01/2025     Advance Directives   What are advance directives?  Advance directives are legal documents  that state your wishes and plans for medical care. These plans are made ahead of time in case you lose your ability to make decisions for yourself. Advance directives can apply to any medical decision, such as the treatments you want, and if you want to donate organs.   What are the types of advance directives?  There are many types of advance directives, and each state has rules about how to use them. You may choose a combination of any of the following:  Living will:  This is a written record of the treatment you want. You can also choose which treatments you do not want, which to limit, and which to stop at a certain time. This includes surgery, medicine, IV fluid, and tube feedings.   Durable power of  for healthcare (DPAHC):  This is a written record that states who you want to make healthcare choices for you when you are unable to make them for yourself. This person, called a proxy, is usually a family member or a friend. You may choose more than 1 proxy.  Do not resuscitate (DNR) order:  A DNR order is used in case your heart stops beating or you stop breathing. It is a request not to have certain forms of treatment, such as CPR. A DNR order may be included in other types of advance directives.  Medical directive:  This covers the care that you want if you are in a coma, near death, or unable to make decisions for yourself. You can list the treatments you want for each condition. Treatment may include pain medicine, surgery, blood transfusions, dialysis, IV or tube feedings, and a ventilator (breathing machine).  Values history:  This document has questions about your views, beliefs, and how you feel and think about life. This information can help others choose the care that you would choose.  Why are advance directives important?  An advance directive helps you control your care. Although spoken wishes may be used, it is better to have your wishes written down. Spoken wishes can be misunderstood, or  not followed. Treatments may be given even if you do not want them. An advance directive may make it easier for your family to make difficult choices about your care.   Fall Prevention    Fall prevention  includes ways to make your home and other areas safer. It also includes ways you can move more carefully to prevent a fall. Health conditions that cause changes in your blood pressure, vision, or muscle strength and coordination may increase your risk for falls. Medicines may also increase your risk for falls if they make you dizzy, weak, or sleepy.   Fall prevention tips:   Stand or sit up slowly.    Use assistive devices as directed.    Wear shoes that fit well and have soles that .    Wear a personal alarm.    Stay active.    Manage your medical conditions.    Home Safety Tips:  Add items to prevent falls in the bathroom.    Keep paths clear.    Install bright lights in your home.    Keep items you use often on shelves within reach.    Paint or place reflective tape on the edges of your stairs.    Weight Management   Why it is important to manage your weight:  Being overweight increases your risk of health conditions such as heart disease, high blood pressure, type 2 diabetes, and certain types of cancer. It can also increase your risk for osteoarthritis, sleep apnea, and other respiratory problems. Aim for a slow, steady weight loss. Even a small amount of weight loss can lower your risk of health problems.  How to lose weight safely:  A safe and healthy way to lose weight is to eat fewer calories and get regular exercise. You can lose up about 1 pound a week by decreasing the number of calories you eat by 500 calories each day.   Healthy meal plan for weight management:  A healthy meal plan includes a variety of foods, contains fewer calories, and helps you stay healthy. A healthy meal plan includes the following:  Eat whole-grain foods more often.  A healthy meal plan should contain fiber. Fiber is the  part of grains, fruits, and vegetables that is not broken down by your body. Whole-grain foods are healthy and provide extra fiber in your diet. Some examples of whole-grain foods are whole-wheat breads and pastas, oatmeal, brown rice, and bulgur.  Eat a variety of vegetables every day.  Include dark, leafy greens such as spinach, kale, timmy greens, and mustard greens. Eat yellow and orange vegetables such as carrots, sweet potatoes, and winter squash.   Eat a variety of fruits every day.  Choose fresh or canned fruit (canned in its own juice or light syrup) instead of juice. Fruit juice has very little or no fiber.  Eat low-fat dairy foods.  Drink fat-free (skim) milk or 1% milk. Eat fat-free yogurt and low-fat cottage cheese. Try low-fat cheeses such as mozzarella and other reduced-fat cheeses.  Choose meat and other protein foods that are low in fat.  Choose beans or other legumes such as split peas or lentils. Choose fish, skinless poultry (chicken or turkey), or lean cuts of red meat (beef or pork). Before you cook meat or poultry, cut off any visible fat.   Use less fat and oil.  Try baking foods instead of frying them. Add less fat, such as margarine, sour cream, regular salad dressing and mayonnaise to foods. Eat fewer high-fat foods. Some examples of high-fat foods include french fries, doughnuts, ice cream, and cakes.  Eat fewer sweets.  Limit foods and drinks that are high in sugar. This includes candy, cookies, regular soda, and sweetened drinks.  Exercise:  Exercise at least 30 minutes per day on most days of the week. Some examples of exercise include walking, biking, dancing, and swimming. You can also fit in more physical activity by taking the stairs instead of the elevator or parking farther away from stores. Ask your healthcare provider about the best exercise plan for you.    © Copyright [employer] 2018 Information is for End User's use only and may not be sold, redistributed or otherwise  used for commercial purposes. All illustrations and images included in CareNotes® are the copyrighted property of [employer] or [employer]

## 2025-07-22 NOTE — ASSESSMENT & PLAN NOTE
Orders:  •  CBC and differential; Future  •  Comprehensive metabolic panel; Future  •  Lipid panel; Future  •  TSH, 3rd generation; Future  •  T4, free; Future

## 2025-07-22 NOTE — TELEPHONE ENCOUNTER
Patient called looking for an appointment for SOC on:scalp   Is the SOC changing in size/color/or shape?no  Is it bleeding/or oozing?no  Is this a new issue?2 weeks   Does it itch, burn, or hurt at all?itchy   Do you have a personal or family history of skin cancer? grandmother Melanoma   Routine referral received

## 2025-07-23 RX ORDER — LEVOTHYROXINE SODIUM 75 UG/1
75 TABLET ORAL DAILY
Qty: 90 TABLET | Refills: 1 | Status: SHIPPED | OUTPATIENT
Start: 2025-07-23

## 2025-08-04 DIAGNOSIS — F41.9 ANXIETY: ICD-10-CM

## 2025-08-05 RX ORDER — ALPRAZOLAM 0.5 MG
0.5 TABLET ORAL 2 TIMES DAILY PRN
Qty: 10 TABLET | Refills: 0 | Status: SHIPPED | OUTPATIENT
Start: 2025-08-05

## 2025-08-22 DIAGNOSIS — E78.2 HYPERLIPIDEMIA, MIXED: ICD-10-CM

## 2025-08-22 RX ORDER — ROSUVASTATIN CALCIUM 5 MG/1
5 TABLET, COATED ORAL DAILY
Qty: 90 TABLET | Refills: 1 | Status: SHIPPED | OUTPATIENT
Start: 2025-08-22